# Patient Record
Sex: FEMALE | Race: WHITE | NOT HISPANIC OR LATINO | Employment: UNEMPLOYED | URBAN - METROPOLITAN AREA
[De-identification: names, ages, dates, MRNs, and addresses within clinical notes are randomized per-mention and may not be internally consistent; named-entity substitution may affect disease eponyms.]

---

## 2017-01-03 ENCOUNTER — LAB CONVERSION - ENCOUNTER (OUTPATIENT)
Dept: OTHER | Facility: OTHER | Age: 52
End: 2017-01-03

## 2017-01-03 LAB
CREATININE, RANDOM URINE (HISTORICAL): 104 MG/DL (ref 20–320)
MAGNESIUM, UR (HISTORICAL): 11.6 MG/DL
MICROALBUMIN/CREATININE RATIO (HISTORICAL): 112 MCG/MG CREAT

## 2017-01-11 ENCOUNTER — GENERIC CONVERSION - ENCOUNTER (OUTPATIENT)
Dept: OTHER | Facility: OTHER | Age: 52
End: 2017-01-11

## 2017-01-17 DIAGNOSIS — N39.0 URINARY TRACT INFECTION: ICD-10-CM

## 2017-03-05 ENCOUNTER — LAB CONVERSION - ENCOUNTER (OUTPATIENT)
Dept: OTHER | Facility: OTHER | Age: 52
End: 2017-03-05

## 2017-03-05 LAB
BACTERIA UR QL AUTO: ABNORMAL /HPF
BILIRUB UR QL STRIP: NEGATIVE
COLOR UR: YELLOW
COMMENT (HISTORICAL): ABNORMAL
FECAL OCCULT BLOOD DIAGNOSTIC (HISTORICAL): ABNORMAL
GLUCOSE (HISTORICAL): NEGATIVE
HYALINE CASTS #/AREA URNS LPF: ABNORMAL /LPF
KETONES UR STRIP-MCNC: NEGATIVE MG/DL
LEUKOCYTE ESTERASE UR QL STRIP: ABNORMAL
NITRITE UR QL STRIP: POSITIVE
PH UR STRIP.AUTO: 5.5 [PH] (ref 5–8)
PROT UR STRIP-MCNC: ABNORMAL MG/DL
RBC (HISTORICAL): ABNORMAL /HPF
SP GR UR STRIP.AUTO: 1.02 (ref 1–1.03)
SQUAMOUS EPITHELIAL CELLS (HISTORICAL): ABNORMAL /HPF
WBC # BLD AUTO: ABNORMAL /HPF

## 2017-03-06 ENCOUNTER — LAB CONVERSION - ENCOUNTER (OUTPATIENT)
Dept: OTHER | Facility: OTHER | Age: 52
End: 2017-03-06

## 2017-03-06 LAB
BACTERIA UR QL AUTO: ABNORMAL /HPF
BILIRUB UR QL STRIP: NEGATIVE
COLOR UR: YELLOW
COMMENT (HISTORICAL): ABNORMAL
CULTURE RESULT (HISTORICAL): ABNORMAL
FECAL OCCULT BLOOD DIAGNOSTIC (HISTORICAL): ABNORMAL
GLUCOSE (HISTORICAL): NEGATIVE
HYALINE CASTS #/AREA URNS LPF: ABNORMAL /LPF
KETONES UR STRIP-MCNC: NEGATIVE MG/DL
LEUKOCYTE ESTERASE UR QL STRIP: ABNORMAL
NITRITE UR QL STRIP: POSITIVE
PH UR STRIP.AUTO: 5.5 [PH] (ref 5–8)
PROT UR STRIP-MCNC: ABNORMAL MG/DL
RBC (HISTORICAL): ABNORMAL /HPF
SP GR UR STRIP.AUTO: 1.02 (ref 1–1.03)
SQUAMOUS EPITHELIAL CELLS (HISTORICAL): ABNORMAL /HPF
WBC # BLD AUTO: ABNORMAL /HPF

## 2017-03-07 ENCOUNTER — LAB CONVERSION - ENCOUNTER (OUTPATIENT)
Dept: OTHER | Facility: OTHER | Age: 52
End: 2017-03-07

## 2017-08-04 ENCOUNTER — ALLSCRIPTS OFFICE VISIT (OUTPATIENT)
Dept: OTHER | Facility: OTHER | Age: 52
End: 2017-08-04

## 2017-08-04 DIAGNOSIS — Z12.31 ENCOUNTER FOR SCREENING MAMMOGRAM FOR MALIGNANT NEOPLASM OF BREAST: ICD-10-CM

## 2017-08-06 ENCOUNTER — LAB CONVERSION - ENCOUNTER (OUTPATIENT)
Dept: OTHER | Facility: OTHER | Age: 52
End: 2017-08-06

## 2017-08-06 LAB
A/G RATIO (HISTORICAL): 1.6 (CALC) (ref 1–2.5)
ALBUMIN SERPL BCP-MCNC: 4.7 G/DL (ref 3.6–5.1)
ALP SERPL-CCNC: 106 U/L (ref 33–130)
ALT SERPL W P-5'-P-CCNC: 27 U/L (ref 6–29)
AST SERPL W P-5'-P-CCNC: 21 U/L (ref 10–35)
BACTERIA UR QL AUTO: ABNORMAL /HPF
BASOPHILS # BLD AUTO: 0.4 %
BASOPHILS # BLD AUTO: 37 CELLS/UL (ref 0–200)
BILIRUB SERPL-MCNC: 0.4 MG/DL (ref 0.2–1.2)
BILIRUB UR QL STRIP: NEGATIVE
BUN SERPL-MCNC: 24 MG/DL (ref 7–25)
BUN/CREA RATIO (HISTORICAL): ABNORMAL (CALC) (ref 6–22)
CALCIUM (ADJUSTED FOR ALBUMIN) (HISTORICAL): 10 MG/DL (CALC) (ref 8.6–10.2)
CALCIUM SERPL-MCNC: 10.2 MG/DL (ref 8.6–10.4)
CHLORIDE SERPL-SCNC: 100 MMOL/L (ref 98–110)
CHOLEST SERPL-MCNC: 163 MG/DL (ref 125–200)
CHOLEST/HDLC SERPL: 3.9 (CALC)
CO2 SERPL-SCNC: 26 MMOL/L (ref 20–31)
COLOR UR: YELLOW
COMMENT (HISTORICAL): ABNORMAL
CREAT SERPL-MCNC: 0.7 MG/DL (ref 0.5–1.05)
CULTURE RESULT (HISTORICAL): ABNORMAL
DEPRECATED RDW RBC AUTO: 13 % (ref 11–15)
EGFR AFRICAN AMERICAN (HISTORICAL): 115 ML/MIN/1.73M2
EGFR-AMERICAN CALC (HISTORICAL): 99 ML/MIN/1.73M2
EOSINOPHIL # BLD AUTO: 1.4 %
EOSINOPHIL # BLD AUTO: 129 CELLS/UL (ref 15–500)
FECAL OCCULT BLOOD DIAGNOSTIC (HISTORICAL): ABNORMAL
GAMMA GLOBULIN (HISTORICAL): 2.9 G/DL (CALC) (ref 1.9–3.7)
GLUCOSE (HISTORICAL): 126 MG/DL (ref 65–99)
GLUCOSE (HISTORICAL): NEGATIVE
GLUCOSE, PLASMA (HISTORICAL): 122 MG/DL (ref 65–99)
HCT VFR BLD AUTO: 36.7 % (ref 35–45)
HDLC SERPL-MCNC: 42 MG/DL
HEPATITIS C ANTIBODY (HISTORICAL): NORMAL
HGB BLD-MCNC: 12 G/DL (ref 11.7–15.5)
HYALINE CASTS #/AREA URNS LPF: ABNORMAL /LPF
KETONES UR STRIP-MCNC: NEGATIVE MG/DL
LDL CHOLESTEROL (HISTORICAL): 45 MG/DL (CALC)
LEUKOCYTE ESTERASE UR QL STRIP: ABNORMAL
LYMPHOCYTES # BLD AUTO: 4536 CELLS/UL (ref 850–3900)
LYMPHOCYTES # BLD AUTO: 49.3 %
MCH RBC QN AUTO: 27.6 PG (ref 27–33)
MCHC RBC AUTO-ENTMCNC: 32.7 G/DL (ref 32–36)
MCV RBC AUTO: 84.6 FL (ref 80–100)
MONOCYTES # BLD AUTO: 561 CELLS/UL (ref 200–950)
MONOCYTES (HISTORICAL): 6.1 %
NEUTROPHILS # BLD AUTO: 3938 CELLS/UL (ref 1500–7800)
NEUTROPHILS # BLD AUTO: 42.8 %
NITRITE UR QL STRIP: POSITIVE
NON-HDL-CHOL (CHOL-HDL) (HISTORICAL): 121 MG/DL (CALC)
PH UR STRIP.AUTO: ABNORMAL [PH] (ref 5–8)
PLATELET # BLD AUTO: 351 THOUSAND/UL (ref 140–400)
PMV BLD AUTO: 9.7 FL (ref 7.5–12.5)
POTASSIUM SERPL-SCNC: 4.1 MMOL/L (ref 3.5–5.3)
PROT UR STRIP-MCNC: ABNORMAL MG/DL
RBC # BLD AUTO: 4.34 MILLION/UL (ref 3.8–5.1)
RBC (HISTORICAL): ABNORMAL /HPF
SIGNAL TO CUT-OFF (HISTORICAL): 0.01
SODIUM SERPL-SCNC: 138 MMOL/L (ref 135–146)
SP GR UR STRIP.AUTO: 1.02 (ref 1–1.03)
SQUAMOUS EPITHELIAL CELLS (HISTORICAL): ABNORMAL /HPF
TOTAL PROTEIN (HISTORICAL): 7.6 G/DL (ref 6.1–8.1)
TRIGL SERPL-MCNC: 380 MG/DL
URATE CRY URNS QL MICRO: ABNORMAL /HPF
WBC # BLD AUTO: 9.2 THOUSAND/UL (ref 3.8–10.8)
WBC # BLD AUTO: ABNORMAL /HPF

## 2017-08-07 ENCOUNTER — GENERIC CONVERSION - ENCOUNTER (OUTPATIENT)
Dept: OTHER | Facility: OTHER | Age: 52
End: 2017-08-07

## 2017-08-07 LAB
CREATININE, RANDOM URINE (HISTORICAL): 101 MG/DL (ref 20–320)
EST. AVERAGE GLUCOSE BLD GHB EST-MCNC: 186 MG/DL (CALC)
HBA1C MFR BLD HPLC: 7.4 % OF TOTAL HGB
MAGNESIUM, UR (HISTORICAL): 21.8 MG/DL
MICROALBUMIN/CREATININE RATIO (HISTORICAL): 216 MCG/MG CREAT

## 2017-08-08 ENCOUNTER — LAB CONVERSION - ENCOUNTER (OUTPATIENT)
Dept: OTHER | Facility: OTHER | Age: 52
End: 2017-08-08

## 2017-09-21 ENCOUNTER — LAB REQUISITION (OUTPATIENT)
Dept: LAB | Facility: HOSPITAL | Age: 52
End: 2017-09-21
Payer: COMMERCIAL

## 2017-09-21 ENCOUNTER — ALLSCRIPTS OFFICE VISIT (OUTPATIENT)
Dept: OTHER | Facility: OTHER | Age: 52
End: 2017-09-21

## 2017-09-21 DIAGNOSIS — R21 RASH AND OTHER NONSPECIFIC SKIN ERUPTION: ICD-10-CM

## 2017-09-21 PROCEDURE — 87252 VIRUS INOCULATION TISSUE: CPT | Performed by: INTERNAL MEDICINE

## 2017-10-02 LAB — VIRUS SPEC CULT: NORMAL

## 2017-10-04 ENCOUNTER — GENERIC CONVERSION - ENCOUNTER (OUTPATIENT)
Dept: OTHER | Facility: OTHER | Age: 52
End: 2017-10-04

## 2018-01-02 ENCOUNTER — GENERIC CONVERSION - ENCOUNTER (OUTPATIENT)
Dept: OTHER | Facility: OTHER | Age: 53
End: 2018-01-02

## 2018-01-02 ENCOUNTER — GENERIC CONVERSION - ENCOUNTER (OUTPATIENT)
Dept: INTERNAL MEDICINE CLINIC | Facility: CLINIC | Age: 53
End: 2018-01-02

## 2018-01-11 NOTE — PROGRESS NOTES
Assessment    1  Need for hepatitis C screening test (V73 89) (Z11 59)   2  Encounter for preventive health examination (V70 0) (Z00 00)    Plan  Anemia, Benign essential hypertension, DM (diabetes mellitus screen), Hematuria,  microscopic, Hyperlipidemia, Obesity    · (1) URINALYSIS w URINE C/S REFLEX (will reflex a microscopy if leukocytes, occult  blood, or nitrites are not within normal limits); Status:Active; Requested for:39Iul7357;    · (Q) CBC (INCLUDES DIFF/PLT) (REFL); Status:Active; Requested for:04Aug2017;    · (Q) COMPREHENSIVE METABOLIC PNL W/ADJUSTED CALCIUM; Status:Active; Requested for:04Aug2017;    · (Q) GLUCOSE, PLASMA; Status:Active; Requested for:04Aug2017;    · (Q) LIPID PANEL WITH REFLEX TO DIRECT LDL; Status:Active; Requested  for:04Aug2017;    · (Q) MICROALBUMIN, RANDOM URINE (W/CREATININE); Status:Active; Requested  for:04Aug2017; Anemia, Benign essential hypertension, DM (diabetes mellitus screen), Hematuria,  microscopic, Hyperlipidemia, Obesity, Type 2 diabetes mellitus with hyperglycemia    · (Q) HEMOGLOBIN A1C WITH MPG; Status:Active; Requested for:04Aug2017;   Need for hepatitis C screening test    · (Q) HEPATITIS C ANTIBODY; Status:Active; Requested for:04Aug2017;   Screen for colon cancer    · 1 - Leslie Brown DO (Gastroenterology) Co-Management  *  Status: Active   Requested for: 12JFS3707  Care Summary provided  : Yes  Type 2 diabetes mellitus with hyperglycemia    · *VB - Foot Exam; Status:Active; Requested for:20Qtn9893;     Discussion/Summary  health maintenance visit Currently, she eats a poor diet and has an inadequate exercise regimen  cervical cancer screening is not indicated Breast cancer screening: mammogram is current  Colorectal cancer screening: colonoscopy has been ordered  Osteoporosis screening: bone mineral density testing is not indicated  Screening lab work includes hemoglobin, glucose and lipid profile   The immunizations are up to date and She recalls receiving the pneumonia vaccine in the apst bec she has asthma  Hepatitis C Screening:  The patient agrees to Hepatitis C screening  Wellness form completed for insurance  We agreed that she will return for a yearly physical    The patient was counseled regarding impressions  Possible side effects of new medications were reviewed with the patient/guardian today  The treatment plan was reviewed with the patient/guardian  The patient/guardian understands and agrees with the treatment plan      Chief Complaint  Wellness      History of Present Illness  HM, Adult Female: The patient is being seen for a health maintenance evaluation  The last health maintenance visit was 1 year(s) ago  Social History: Household members include spouse  She is   The patient has never smoked cigarettes  She reports occasional alcohol use  The patient has no concerns about alcohol abuse  She has never used illicit drugs  General Health: The patient's health since the last visit is described as good  She has regular dental visits  The patient reports her last dental visit was 5/2017  She complains of vision problems  Vision care includes wearing glasses and having regular eye examinations  She has hearing loss  Immunizations status: up to date  Lifestyle:  She consumes a diverse and healthy diet  She is on a diet and is generally adherent  She does not have any weight concerns  She exercises regularly  She exercises 3 or more times per week for 30 or more minutes per session  Exercise includes walking  She does not use tobacco  The patient has never smoked cigarettes  She consumes alcohol  She reports occasional alcohol use  She typically drinks beer  Alcohol concern: The patient has no concerns about alcohol abuse  She denies drug use  She has never used illicit drugs  Reproductive health: the patient is perimenopausal   she reports normal menses  she uses no contraception  she is sexually active   She is monogamous with a male partner  Screening: Cervical cancer screening includes uncertain timing of her last pap smear and s/p TAHBSO  Breast cancer screening includes a mammogram performed last year  She hasn't been previously screened for colorectal cancer  Metabolic screening includes lipid profile performed 12/2016 and glucose screening performed 12/2016  Cardiovascular risk factors: hypertension, diabetes and obesity, but no high LDL cholesterol, no low HDL cholesterol, no stress, no tobacco use, no illicit drug use, no sedentary lifestyle and no family history of cardiovascular disease  Review of Systems    Constitutional: no fever, not feeling poorly, no chills and not feeling tired  Cardiovascular: no chest pain and no palpitations  Respiratory: no shortness of breath and no cough  Gastrointestinal: no abdominal pain, no constipation and no diarrhea  Genitourinary: mild pelvic pressure, low back pain for a few days, but no dysuria  Active Problems    1  Allergic rhinitis (477 9) (J30 9)   2  Anemia (285 9) (D64 9)   3  Asthma (493 90) (J45 909)   4  Benign essential hypertension (401 1) (I10)   5  Breast screening (V76 10) (Z12 31)   6  DM (diabetes mellitus screen) (V77 1) (Z13 1)   7  Elevated erythrocyte sedimentation rate (790 1) (R70 0)   8  Hematuria, microscopic (599 72) (R31 29)   9  Hot flashes (627 2) (N95 1)   10  Hyperlipidemia (272 4) (E78 5)   11  Irritable (799 22) (R45 4)   12  Irritable bowel syndrome (564 1) (K58 9)   13  Menopause (627 2) (Z78 0)   14  Moderate obstructive sleep apnea (327 23) (G47 33)   15  Need for prophylactic vaccination and inoculation against influenza (V04 81) (Z23)   16  Obesity (278 00) (E66 9)   17  Renal stone (592 0) (N20 0)   18  Screen for colon cancer (V76 51) (Z12 11)   19  Screening for hyperlipidemia (V77 91) (Z13 220)   20  Screening, anemia, deficiency, iron (V78 0) (Z13 0)   21  Snoring (786 09) (R06 83)   22   Type 2 diabetes mellitus with hyperglycemia (250 00) (E11 65)   23  Urge and stress incontinence (788 33) (N39 46)   24  Visit for screening mammogram (V76 12) (Z12 31)    Past Medical History    · History of Abnormal LFTs (liver function tests) (790 6) (R79 89)   · History of Blood pressure elevated (401 9) (I10)   · History of anemia (V12 3) (Z86 2)   · History of cough   · History of hematuria (V13 09) (Z87 448)   · History of hypercalcemia (V12 29) (Z86 39)   · History of joint pain (V13 59) (Z87 39)   · History of leukocytosis (V12 3) (Z86 2)   · History of Papanicolaou smear (V45 89) (Z98 890)   · History of urinary tract infection (V13 02) (Z87 440)   · History of Microalbuminuria (791 0) (R80 9)   · History of Pain of finger, unspecified laterality (729 5) (M79 646)    Surgical History    · History of  Section   · History of Total Abdominal Hysterectomy With Removal Of Both Ovaries    Family History  Father    · Family history of borderline diabetes mellitus (V18 0) (Z84 89)   · Family history of melanoma (V16 8) (Z80 8)    Social History    · Being A Social Drinker   · Exercising Regularly   · Never A Smoker    Current Meds   1  Aspirin 81 MG TABS; TAKE 1 TABLET DAILY FOR STROKE PREVENTION Recorded   2  Atorvastatin Calcium 10 MG Oral Tablet; TAKE 1 TABLET AT BEDTIME; Therapy: 85NHC9091 to (Last Rx:15Kia5530)  Requested for: 2017 Ordered   3  Fish Oil CAPS; 2,000 mg capsule 1x daily; Therapy: (Recorded:82Olf8973) to Recorded   4  Lisinopril 5 MG Oral Tablet; Take 1 tablet daily; Therapy: 34YZQ7080 to (Last Rx:72Fih0601)  Requested for: 2017 Ordered   5  MetFORMIN HCl - 500 MG Oral Tablet; TAKE 1 TABLET TWICE A DAY WITH FOOD; Therapy: 84YVJ7556 to (Last Rx:49Phw2140)  Requested for: 2017 Ordered   6  Montelukast Sodium 10 MG Oral Tablet; Take 1 Tablet in the Evening; Therapy: 48TKV7582 to (Last Rx:46Vqu6191)  Requested for: 86Ixw9874 Ordered   7   OneTouch Delica Lancets 75Z Miscellaneous; USE TO TEST THREE TIMES A DAY; Therapy: 86KHB6237 to (Last Rx:64Dbe6071)  Requested for: 43Zzy6428 Ordered   8  OneTouch Ultra Blue In [employer]; USE 1 STRIP THREE TIMES A DAY; Therapy: 59QHW8034 to (Last Rx:29Cfz7970)  Requested for: 14Pjm2128 Ordered   9  ProAir  (90 Base) MCG/ACT Inhalation Aerosol Solution; INHALE 1 TO 2 PUFFS   EVERY 4 TO 6 HOURS AS NEEDED; Therapy: 05ZKR4184 to (Evaluate:61Teg7248); Last Rx:17Jun2013 Ordered   10  Qvar 40 MCG/ACT Inhalation Aerosol Solution; inhale 1 puff twice daily; Therapy: 32Myb1332 to (Evaluate:43Ckg7793)  Requested for: 20Bnm2700; Last    Rx:88Oxe9595 Ordered    Allergies    1  No Known Drug Allergies    2  Seasonal    Vitals   Recorded: 04Aug2017 10:05AM   Heart Rate 84   Systolic 318   Diastolic 76   Height 5 ft 3 in   Weight 186 lb 6 oz   BMI Calculated 33 02   BSA Calculated 1 88   O2 Saturation 97     Physical Exam    Constitutional   General appearance: No acute distress, well appearing and well nourished  obese  Head and Face   Head and face: Normal     Eyes   Conjunctiva and lids: No swelling, erythema or discharge  Ears, Nose, Mouth, and Throat   Otoscopic examination: Tympanic membranes translucent with normal light reflex  Canals patent without erythema  Oropharynx: Normal with no erythema, edema, exudate or lesions  Neck   Neck: Supple, symmetric, trachea midline, no masses  Thyroid: Normal, no thyromegaly  Pulmonary   Respiratory effort: No increased work of breathing or signs of respiratory distress  Auscultation of lungs: Clear to auscultation  Cardiovascular   Auscultation of heart: Normal rate and rhythm, normal S1 and S2, no murmurs  Examination of extremities for edema and/or varicosities: Normal     Abdomen   Abdomen: Non-tender, no masses  Liver and spleen: No hepatomegaly or splenomegaly  Lymphatic   Palpation of lymph nodes in neck: No lymphadenopathy      Musculoskeletal   Gait and station: Normal  Psychiatric   Orientation to person, place, and time: Normal     Mood and affect: Normal     Socks and shoes removed, Right Foot Findings: normal foot  The toes were normal  Socks and Shoes removed, Left Foot Findings: normal foot  The toes were normal    Monofilament Testing: normal tactile sensation with monofilament testing throughout both feet  Vascular: Pulses: 2+ in the dorsalis pedis  Pulses: 2+ in the dorsalis pedis  Assign Risk Category: 0: No loss of protective sensation, no deformity  No present risk  Future Appointments    Date/Time Provider Specialty Site   08/10/2018 10:00 AM ZUNILDA Alvarez   [facility]   Electronically signed by : ZUNILDA Dye ; Aug  4 2017 11:44AM EST                       (Author)

## 2018-01-12 VITALS
HEIGHT: 63 IN | DIASTOLIC BLOOD PRESSURE: 76 MMHG | WEIGHT: 186.38 LBS | HEART RATE: 84 BPM | SYSTOLIC BLOOD PRESSURE: 126 MMHG | OXYGEN SATURATION: 97 % | BODY MASS INDEX: 33.02 KG/M2

## 2018-01-13 VITALS
OXYGEN SATURATION: 97 % | HEIGHT: 63 IN | HEART RATE: 82 BPM | DIASTOLIC BLOOD PRESSURE: 70 MMHG | SYSTOLIC BLOOD PRESSURE: 120 MMHG

## 2018-01-13 NOTE — PROGRESS NOTES
Assessment    1  Encounter for preventive health examination (V70 0) (Z00 00)   2  DM (diabetes mellitus screen) (V77 1) (Z13 1)   3  Screening for hyperlipidemia (V77 91) (Z13 220)   4  Screening, anemia, deficiency, iron (V78 0) (Z13 0)   5  Type 2 diabetes mellitus with hyperglycemia (250 00) (E11 65)   6  Hyperlipidemia (272 4) (E78 5)   7  Visit for screening mammogram (V76 12) (Z12 31)    Plan   Benign essential hypertension, Type 2 diabetes mellitus with hyperglycemia    · Lisinopril 5 MG Oral Tablet; Take 1 tablet daily  DM (diabetes mellitus screen)    · (1) GLUCOSE,  FASTING; Status:Active; Requested for:20Jqk1372;    · (Q) HEMOGLOBIN A1C WITH MPG; Status:Active; Requested for:37Qmi0405;   Hyperlipidemia    · Atorvastatin Calcium 10 MG Oral Tablet; TAKE 1 TABLET AT BEDTIME  Screening, anemia, deficiency, iron    · (Q) CBC (INCLUDES DIFF/PLT) (REFL); Status:Active; Requested for:05Stk7229;   Type 2 diabetes mellitus with hyperglycemia    · MetFORMIN HCl - 500 MG Oral Tablet; TAKE 1 TABLET TWICE A DAY WITH  FOOD   · OneTouch Delica Lancets 54E Miscellaneous; test tid   · OneTouch Ultra Blue In Vitro Strip; USE 1 STRIP TID   · (1) MICROALBUMIN CREATININE RATIO, RANDOM URINE; Status:Active; Requested  for:98Ndi9098;    · (Q) COMPREHENSIVE METABOLIC PNL W/ADJUSTED CALCIUM; Status:Active; Requested for:23Gzp3614;    · (Q) URINALYSIS REFLEX; Status:Active; Requested for:29Miz4015;    · *VB - Foot Exam; Status:Active; Requested for:52Mup4028;     (Q) LIPID PANEL WITH REFLEX TO DIRECT LDL; Status:Resulted - Requires Verification;   Done: 76KVC8930 12:00AM  Due:17Aug2017;Ordered; For:Screening for hyperlipidemia; Ordered By:Reyes, Lea;   * MAMMO SCREENING BILATERAL W CAD; Status:Hold For - Scheduling,Exact Date; Requested for:Dec 2016;   Perform:[facility]; MARK:14VAN5217;HFHSZDL;     For:Visit for screening mammogram; Ordered By:Reyes, Lea;      Discussion/Summary  health maintenance visit Currently, she eats a poor diet and has an inadequate exercise regimen  cervical cancer screening is not indicated Breast cancer screening: mammogram is current  [place]  Osteoporosis screening: bone mineral density testing is not indicated  Screening lab work includes hemoglobin, glucose and lipid profile  The Flu vaccine annually  Declines to f/u >1 a year  Will wait for labs  Glucose and lipid panel required for wellness program for her 's work  Insists only "routine" tests be ordered per her conversation with her insurance based on last year's labs  CMP, urine tests apparently not "routine" and will cost her ($4000 deductible)  I asked her to confirm this because she needs a CMP done   The patient was counseled regarding impressions  Chief Complaint  Wellness      History of Present Illness  HM, Adult Female: The patient is being seen for a health maintenance evaluation  The last health maintenance visit was 1 year(s) ago  Social History: Household members include spouse  She is   General Health: She has regular dental visits  She complains of vision problems  Vision care includes wearing glasses and an eye examination within the last year  She denies hearing loss  Immunizations status: up to date  Lifestyle:  She does not have a healthy diet  She has weight concerns  She does not exercise regularly  She does not use tobacco    Reproductive health: the patient is postmenopausal    Screening: Cervical cancer screening includes uncertain timing of her last pap smear  Breast cancer screening includes a mammogram performed last year  Metabolic screening includes lipid profile performed within the past five years and glucose screening performed last year  Cardiovascular risk factors: diabetes, but no hypertension  Review of Systems    Constitutional: recent 21 lb weight loss, but no fever and no chills     Cardiovascular: no chest pain and no palpitations  Respiratory: no shortness of breath and no cough  Gastrointestinal: no abdominal pain, no nausea and no constipation  Genitourinary: no dysuria  Active Problems    1  Abnormal LFTs (liver function tests) (790 6) (R79 89)   2  Allergic rhinitis (477 9) (J30 9)   3  Arthralgia (719 40) (M25 50)   4  Asthma (493 90) (J45 909)   5  Benign essential hypertension (401 1) (I10)   6  Breast screening (V76 10) (Z12 39)   7  Cough (786 2) (R05)   8  Elevated erythrocyte sedimentation rate (790 1) (R70 0)   9  Hematuria (599 70) (R31 9)   10  Hematuria, microscopic (599 72) (R31 2)   11  Hot flashes (627 2) (N95 1)   12  Hypercalcemia (275 42) (E83 52)   13  Hyperlipidemia (272 4) (E78 5)   14  Irritable (799 22) (R45 4)   15  Irritable bowel syndrome (564 1) (K58 9)   16  Leukocytosis (leucocytosis) (288 60) (D72 829)   17  Menopause (627 2) (Z78 0)   18  Microalbuminuria (791 0) (R80 9)   19  Moderate obstructive sleep apnea (327 23) (G47 33)   20  Need for prophylactic vaccination and inoculation against influenza (V04 81) (Z23)   21  Obesity (278 00) (E66 9)   22  Pain of finger, unspecified laterality (729 5) (M79 646)   23  Pap smear for cervical cancer screening (V76 2) (Z12 4)   24  Renal stone (592 0) (N20 0)   25  Screen for colon cancer (V76 51) (Z12 11)   26  Snoring (786 09) (R06 83)   27  Type 2 diabetes mellitus with hyperglycemia (250 00) (E11 65)   28  Urge and stress incontinence (788 33) (N39 46)   29  UTI (urinary tract infection) (599 0) (N39 0)   30   Visit for screening mammogram (V76 12) (Z12 31)    Past Medical History    · History of Blood pressure elevated (401 9) (I10)   · History of anemia (V12 3) (Z86 2)    Surgical History    · History of  Section   · History of Total Abdominal Hysterectomy With Removal Of Both Ovaries    Family History  Father    · Family history of borderline diabetes mellitus (V18 0) (Z83 3)   · Family history of melanoma (V16 8) (Z80 8)    Social History    · Being A Social Drinker   · Exercising Regularly   · Never A Smoker    Current Meds   1  Aspirin 81 MG TABS; TAKE 1 TABLET DAILY FOR STROKE PREVENTION Recorded   2  Atorvastatin Calcium 10 MG Oral Tablet; TAKE 1 TABLET DAILY AT BEDTIME; Therapy: 68QAH2913 to (Evaluate:48Fyk7139)  Requested for: 97Fdg6699; Last   Rx:33Ytl7994 Ordered   3  Levocetirizine Dihydrochloride 5 MG Oral Tablet; Take 1 tablet daily; Therapy: 34AUD0510 to (Last Rx:00Gpm2045)  Requested for: 09Ube2389 Ordered   4  Lisinopril 5 MG Oral Tablet; Take 1 tablet daily; Therapy: 32HYZ8145 to (Last Rx:62Zgw2846)  Requested for: 67Unb1530 Ordered   5  MetFORMIN HCl - 500 MG Oral Tablet; TAKE 1 TABLET TWICE A DAY WITH FOOD; Therapy: 01QNZ0792 to (Last Rx:82Yof0585)  Requested for: 43Isx5397 Ordered   6  Montelukast Sodium 10 MG Oral Tablet; Therapy: (Recorded:36Tmq0841) to Recorded   7  OneTouch Delica Lancets 32C Miscellaneous; test tid; Therapy: 47EUO7534 to (Last Rx:28Yha9420)  Requested for: 44Eqs9705 Ordered   8  OneTouch Ultra Blue In Vitro Strip; USE 1 STRIP TID; Therapy: 23GUD1944 to (Evaluate:02Wcz1005)  Requested for: 19Vea8777; Last   Rx:44Xhh7576 Ordered   9  ProAir  (90 Base) MCG/ACT Inhalation Aerosol Solution; INHALE 1 TO 2 PUFFS   EVERY 4 TO 6 HOURS AS NEEDED; Therapy: 19SNH6649 to (Evaluate:52Htx0206); Last Rx:17Jun2013 Ordered   10  Qvar 40 MCG/ACT Inhalation Aerosol Solution; inhale 1 puff twice daily; Therapy: 84Vzn5776 to (Last Rx:75Pzf9852)  Requested for: 87Nqk9244 Ordered    Allergies    1  No Known Drug Allergies    2  Seasonal    Vitals   Recorded: 52ZST9448 90:64KD   Systolic 536   Diastolic 64   Heart Rate 95   Temperature 98 2 F   O2 Saturation 98   Height 5 ft 3 in   Weight 179 lb 6 oz   BMI Calculated 31 77   BSA Calculated 1 85     Physical Exam    Constitutional   General appearance: No acute distress, well appearing and well nourished  obese     Head and Face   Head and face: Normal     Eyes   Conjunctiva and lids: No swelling, erythema or discharge  Ears, Nose, Mouth, and Throat   Otoscopic examination: Tympanic membranes translucent with normal light reflex  Canals patent without erythema  Oropharynx: Normal with no erythema, edema, exudate or lesions  Neck   Neck: Supple, symmetric, trachea midline, no masses  Thyroid: Normal, no thyromegaly  Pulmonary   Respiratory effort: No increased work of breathing or signs of respiratory distress  Percussion of chest: Normal     Cardiovascular   Auscultation of heart: Normal rate and rhythm, normal S1 and S2, no murmurs  Abdomen   Abdomen: Non-tender, no masses  Liver and spleen: No hepatomegaly or splenomegaly  Lymphatic   Palpation of lymph nodes in neck: No lymphadenopathy  Musculoskeletal   Gait and station: Normal     Psychiatric   Orientation to person, place, and time: Normal     Mood and affect: Normal     Right Foot Findings: normal foot  The toes were normal  Left Foot Findings: normal foot  The toes were normal    Monofilament Testing: normal tactile sensation with monofilament testing throughout both feet  Vascular: Pulses: 2+ in the dorsalis pedis  Pulses: 2+ in the dorsalis pedis  Assign Risk Category: 0: No loss of protective sensation, no deformity  No present risk  Future Appointments    Date/Time Provider Specialty Site   08/04/2017 10:00 AM ZUNILDA Woods   [facility]   02/08/2017 10:45 AM Elian Townsend MD Urology [address]     Signatures   Electronically signed by : ZUNILDA Sinha ; Aug 21 2016  5:36PM EST                       (Author)

## 2018-01-14 NOTE — RESULT NOTES
Message   Please call her   Viral culture did not grow anything   Continue gabapentin for the pain and wean off once she is ready, when pain is controlled/better           Verified Results  (1) CULTURE, VIRUS COMPREHENSIVE 52Fje3360 05:00PM Denise Waldron     Test Name Result Flag Reference   CLINICAL REPORT (Report)     Test:        Virus culture  Specimen Type: Other  Specimen Date:   9/21/2017 5:00 PM  Result Date:    10/2/2017 8:07 AM  Result Status:   Final result  Resulting Lab:    [facility]             [address] [facility] [address]      CULTURE                                       ------------------                                   No virus isolated         *** Performed at: [address] [place] [address], [place], [place]      [id number] Director: Janina Bobo MD, Phone: [phone] ***   Source; left flank side    Performed at:  [address], [place], [place]  [id number]  : Janina Bobo MD, Phone:  [phone]       Signatures   Electronically signed by : ZUNILDA Moreno ; Oct  4 2017  4:46PM EST                       (Author)

## 2018-01-15 NOTE — RESULT NOTES
Message     Her sugar/A1C albin from last year to 7 4 I would like her to increase the metformin to 1000mg BID (she is taking 500mg BID)   Repeat labs in 3months and schedule appt   I'll mail her the labs   She also appears to have a UTI and I will send a script for a week of amoxicillin for her to be taken TID        Verified Results  (Q) GLUCOSE, PLASMA [id number] 12:04PM Cleo Rodgers     Test Name Result Flag Reference   GLUCOSE, PLASMA 122 mg/dL H 65-99   Fasting reference interval     For someone without known diabetes, a glucose value  between 100 and 125 mg/dL is consistent with  prediabetes and should be confirmed with a  follow-up test      (Q) LIPID PANEL WITH REFLEX TO DIRECT LDL [id number] 12:04PM Cleo Rodgers     Test Name Result Flag Reference   CHOLESTEROL, TOTAL 163 mg/dL  125-200   HDL CHOLESTEROL 42 mg/dL L > OR = 46   TRIGLICERIDES 589 mg/dL H <150   LDL-CHOLESTEROL 45 mg/dL (calc)  <130   Desirable range <100 mg/dL for patients with CHD or  diabetes and <70 mg/dL for diabetic patients with  known heart disease  CHOL/HDLC RATIO 3 9 (calc)  < OR = 5 0   NON HDL CHOLESTEROL 121 mg/dL (calc)     Target for non-HDL cholesterol is 30 mg/dL higher than   LDL cholesterol target  (Q) HEMOGLOBIN A1C WITH MPG [id number] 12:04PM Cleo Rodgers   REPORT COMMENT:  FASTING:YES     Test Name Result Flag Reference   HEMOGLOBIN A1c 7 4 % of total Hgb H <5 7   For someone without known diabetes, a hemoglobin A1c  value of 6 5% or greater indicates that they may have   diabetes and this should be confirmed with a follow-up   test      For someone with known diabetes, a value <7% indicates   that their diabetes is well controlled and a value   greater than or equal to 7% indicates suboptimal   control  A1c targets should be individualized based on   duration of diabetes, age, comorbid conditions, and   other considerations       Currently, no consensus exists regarding use of  hemoglobin A1c for diagnosis of diabetes for children  MEAN PLASMA GLUCOSE 186 mg/dL (calc)       (Q) CBC (INCLUDES DIFF/PLT) (REFL) [id number] 12:04PM Rich Fines     Test Name Result Flag Reference   WHITE BLOOD CELL COUNT 9 2 Thousand/uL  3 8-10 8   RED BLOOD CELL COUNT 4 34 Million/uL  3 80-5 10   HEMOGLOBIN 12 0 g/dL  11 7-15 5   HEMATOCRIT 36 7 %  35 0-45 0   MCV 84 6 fL  80 0-100 0   MCH 27 6 pg  27 0-33 0   MCHC 32 7 g/dL  32 0-36 0   RDW 13 0 %  11 0-15 0   PLATELET COUNT 885 Thousand/uL  140-400   MPV 9 7 fL  7 5-12 5   ABSOLUTE NEUTROPHILS 3938 cells/uL  9792-2825   ABSOLUTE LYMPHOCYTES 4536 cells/uL H 850-3900   ABSOLUTE MONOCYTES 561 cells/uL  200-950   ABSOLUTE EOSINOPHILS 129 cells/uL     ABSOLUTE BASOPHILS 37 cells/uL  0-200   NEUTROPHILS 42 8 %     LYMPHOCYTES 49 3 %     MONOCYTES 6 1 %     EOSINOPHILS 1 4 %     BASOPHILS 0 4 %       (Q) COMPREHENSIVE METABOLIC PNL W/ADJUSTED CALCIUM [id number] 12:04PM Rich Fines     Test Name Result Flag Reference   GLUCOSE 126 mg/dL H 65-99   Fasting reference interval     For someone without known diabetes, a glucose  value >125 mg/dL indicates that they may have  diabetes and this should be confirmed with a  follow-up test    UREA NITROGEN (BUN) 24 mg/dL  7-25   CREATININE 0 70 mg/dL  0 50-1 05   For patients >52years of age, the reference limit  for Creatinine is approximately 13% higher for people  identified as -American  eGFR NON-AFR   AMERICAN 99 mL/min/1 73m2  > OR = 60   eGFR AFRICAN AMERICAN 115 mL/min/1 73m2  > OR = 60   BUN/CREATININE RATIO   2-09   NOT APPLICABLE (calc)   SODIUM 138 mmol/L  135-146   POTASSIUM 4 1 mmol/L  3 5-5 3   CHLORIDE 100 mmol/L     CARBON DIOXIDE 26 mmol/L  20-31   CALCIUM 10 2 mg/dL  8 6-10 4   CALCIUM (ADJUSTED FOR$ALBUMIN) 10 0 mg/dL (calc)  8 6-10 2   PROTEIN, TOTAL 7 6 g/dL  6 1-8 1   ALBUMIN 4 7 g/dL  3 6-5 1   GLOBULIN 2 9 g/dL (calc)  1 9-3 7   ALBUMIN/GLOBULIN RATIO 1 6 (calc)  1 0-2 5   BILIRUBIN, TOTAL 0 4 mg/dL  0 2-1 2   ALKALINE PHOSPHATASE 106 U/L     AST 21 U/L  10-35   ALT 27 U/L  6-29     (Q) MICROALBUMIN, RANDOM URINE (W/CREATININE) [id number] 12:04PM [facility]     Test Name Result Flag Reference   CREATININE, RANDOM URINE 101 mg/dL     MICROALBUMIN 21 8 mg/dL     Reference Range  Not established   MICROALBUMIN/CREATININE$RATIO, RANDOM URINE 216 mcg/mg creat H <30   The ADA defines abnormalities in albumin  excretion as follows:     Category         Result (mcg/mg creatinine)     Normal                    <30  Microalbuminuria            Clinical albuminuria   > OR = 300     The ADA recommends that at least two of three  specimens collected within a 3-6 month period be  abnormal before considering a patient to be  within a diagnostic category  (1) URINALYSIS w URINE C/S REFLEX (will reflex a microscopy if leukocytes, occult blood, or nitrites are not within normal limits) [id number] 12:04PM [facility]     Test Name Result Flag Reference   SPECIFIC GRAVITY 1 022  1 001-1 035   BILIRUBIN NEGATIVE  NEGATIVE   URIC ACID CRYSTALS MANY /HPF A NONE OR FEW   GLUCOSE NEGATIVE  NEGATIVE   COLOR YELLOW  YELLOW   APPEARANCE CLOUDY A CLEAR   PH < OR = 5 0  5 0-8 0   KETONES NEGATIVE  NEGATIVE   OCCULT BLOOD 3+ A NEGATIVE   PROTEIN 1+ A NEGATIVE   SQUAMOUS EPITHELIAL CELLS 0-5 /HPF  < OR = 5   HYALINE CAST NONE SEEN /LPF  NONE SEEN   REFLEXIVE URINE CULTURE      CULTURE INDICATED - RESULTS TO FOLLOW   RBC > OR = 60 /HPF A < OR = 2   NITRITE POSITIVE A NEGATIVE   LEUKOCYTE ESTERASE 3+ A NEGATIVE   WBC > OR = 60 /HPF A < OR = 5   BACTERIA MANY /HPF A NONE SEEN     (Q) HEPATITIS C ANTIBODY [id number] 12:04PM [facility]     Test Name Result Flag Reference   HEPATITIS C ANTIBODY NON-REACTIVE  NON-REACTIVE   SIGNAL TO CUT-OFF 0 01  <1 00       Plan  Benign essential hypertension, Type 2 diabetes mellitus with hyperglycemia    · (Q) BASIC METABOLIC PANEL W/O CA; Status:Active;  Requested QUV:[id number];    · (Q) HEMOGLOBIN A1C WITH MPG; Status:Active;  Requested CJF:[id number];   Type 2 diabetes mellitus with hyperglycemia    · From  MetFORMIN HCl - 500 MG Oral Tablet TAKE 1 TABLET TWICE A DAY  WITH FOOD To MetFORMIN HCl - 500 MG Oral Tablet TAKE 2 TABLETS TWICE A DAY  WITH FOOD  UTI (urinary tract infection)    · Amoxicillin 500 MG Oral Capsule; TAKE 1 CAPSULE 3 times daily    Signatures   Electronically signed by : ZUNILDA Fonseca ; Aug  7 2017 12:53PM EST                       (Author)

## 2018-01-17 NOTE — RESULT NOTES
Message   Diabetes is controlled, A1C is 6 7  She is mildly anemic  I would like to recheck this since this is a new finding  Order entered  Please mail repeat hemoglobin hematocrit  Also with blood and bacteria in urine  I think she was supposed to see urology but this was cancelled? I don't see a follow up scheduled  She has a stone on the left kidney  Verified Results  (Q) HEMOGLOBIN A1C WITH MPG [id number] 09:53AM [facility]     Test Name Result Flag Reference   HEMOGLOBIN A1c 6 7 % of total Hgb H <5 7   According to ADA guidelines, hemoglobin A1c <7 0%  represents optimal control in non-pregnant diabetic  patients  Different metrics may apply to specific  patient populations  Standards of Medical Care in    Diabetes Care  2013;36:s11-s66     For the purpose of screening for the presence of  diabetes  <5 7%       Consistent with the absence of diabetes  5 7-6 4%    Consistent with increased risk for diabetes              (prediabetes)  >or=6 5%    Consistent with diabetes     This assay result is consistent with diabetes  mellitus  Currently, no consensus exists for use of hemoglobin  A1c for diagnosis of diabetes for children     MEAN PLASMA GLUCOSE 161 mg/dL (calc)       (Q) CBC (INCLUDES DIFF/PLT) (REFL) [id number] 09:53AM [facility]     Test Name Result Flag Reference   WHITE BLOOD CELL COUNT 9 7 Thousand/uL  3 8-10 8   RED BLOOD CELL COUNT 4 04 Million/uL  3 80-5 10   HEMOGLOBIN 11 3 g/dL L 11 7-15 5   HEMATOCRIT 34 0 % L 35 0-45 0   MCV 84 2 fL  80 0-100 0   MCH 28 0 pg  27 0-33 0   MCHC 33 3 g/dL  32 0-36 0   RDW 13 6 %  11 0-15 0   PLATELET COUNT 348 Thousand/uL  140-400   MPV 8 2 fL  7 5-11 5   ABSOLUTE NEUTROPHILS 4976 cells/uL  4912-8559   ABSOLUTE LYMPHOCYTES 4064 cells/uL H 850-3900   ABSOLUTE MONOCYTES 417 cells/uL  200-950   ABSOLUTE EOSINOPHILS 204 cells/uL     ABSOLUTE BASOPHILS 39 cells/uL  0-200   NEUTROPHILS 51 3 %     LYMPHOCYTES 41 9 %     MONOCYTES 4 3 % EOSINOPHILS 2 1 %     BASOPHILS 0 4 %       (Q) COMPREHENSIVE METABOLIC PNL W/ADJUSTED CALCIUM [id number] 09:53AM Lonzie Nickel     Test Name Result Flag Reference   GLUCOSE 107 mg/dL H 65-99   Fasting reference interval   UREA NITROGEN (BUN) 22 mg/dL  7-25   CREATININE 0 73 mg/dL  0 50-1 05   For patients >52years of age, the reference limit  for Creatinine is approximately 13% higher for people  identified as -American  eGFR NON-AFR   AMERICAN 94 mL/min/1 73m2  > OR = 60   eGFR AFRICAN AMERICAN 109 mL/min/1 73m2  > OR = 60   BUN/CREATININE RATIO   2-66   NOT APPLICABLE (calc)   SODIUM 141 mmol/L  135-146   POTASSIUM 3 9 mmol/L  3 5-5 3   CHLORIDE 104 mmol/L     CARBON DIOXIDE 26 mmol/L  20-31   CALCIUM 9 6 mg/dL  8 6-10 4   CALCIUM (ADJUSTED FOR$ALBUMIN) 9 7 mg/dL (calc)  8 6-10 2   PROTEIN, TOTAL 6 7 g/dL  6 1-8 1   ALBUMIN 4 3 g/dL  3 6-5 1   GLOBULIN 2 4 g/dL (calc)  1 9-3 7   ALBUMIN/GLOBULIN RATIO 1 8 (calc)  1 0-2 5   BILIRUBIN, TOTAL 0 2 mg/dL  0 2-1 2   ALKALINE PHOSPHATASE 100 U/L     AST 13 U/L  10-35   ALT 16 U/L  6-29     (Q) URINALYSIS REFLEX [id number] 09:53AM Lonzie Nickel   REPORT COMMENT:  FASTING:YES     Test Name Result Flag Reference   COLOR YELLOW  YELLOW   APPEARANCE CLEAR  CLEAR   SPECIFIC GRAVITY 1 022  1 001-1 035   PH 5 5  5 0-8 0   GLUCOSE NEGATIVE  NEGATIVE   BILIRUBIN NEGATIVE  NEGATIVE   KETONES NEGATIVE  NEGATIVE   OCCULT BLOOD 3+ A NEGATIVE   PROTEIN TRACE A NEGATIVE   NITRITE POSITIVE A NEGATIVE   LEUKOCYTE ESTERASE 2+ A NEGATIVE   WBC > OR = 60 /HPF A < OR = 5   RBC 3-10 /HPF A < OR = 2   SQUAMOUS EPITHELIAL CELLS 0-5 /HPF  < OR = 5   BACTERIA MODERATE /HPF A NONE SEEN   HYALINE CAST NONE SEEN /LPF  NONE SEEN     (Q) MICROALBUMIN, RANDOM URINE (W/CREATININE) [id number] 09:53AM Lonzie Nickel     Test Name Result Flag Reference   CREATININE, RANDOM URINE 104 mg/dL     MICROALBUMIN 11 6 mg/dL     Reference Range  Not established   MICROALBUMIN/CREATININE$RATIO, RANDOM URINE 112 mcg/mg creat H <30   The ADA defines abnormalities in albumin  excretion as follows:     Category         Result (mcg/mg creatinine)     Normal                    <30  Microalbuminuria            Clinical albuminuria   > OR = 300     The ADA recommends that at least two of three  specimens collected within a 3-6 month period be  abnormal before considering a patient to be  within a diagnostic category  Plan  Anemia    · (Q) HEMOGLOBIN + HEMATOCRIT; Status:Active;  Requested IJM:[id number];     Signatures   Electronically signed by : ZUNILDA Pineda ; Jan 11 2017 11:16PM EST                       (Author)

## 2018-02-06 ENCOUNTER — TELEPHONE (OUTPATIENT)
Dept: INTERNAL MEDICINE CLINIC | Facility: CLINIC | Age: 53
End: 2018-02-06

## 2018-02-07 NOTE — TELEPHONE ENCOUNTER
I do not see that I have ordered either the vaccine or the blood test for her  What is the date of service?  I am not really sure what she is referring to

## 2018-02-08 NOTE — TELEPHONE ENCOUNTER
It was the swab we took in the office of the lesions she had   We swabbed her with her consent so she knew then that it was going to be sent out for confirmation of the diagnosis back then

## 2018-04-19 DIAGNOSIS — E08.65 DIABETES MELLITUS DUE TO UNDERLYING CONDITION WITH HYPERGLYCEMIA, WITHOUT LONG-TERM CURRENT USE OF INSULIN (HCC): ICD-10-CM

## 2018-04-19 DIAGNOSIS — J30.9 ALLERGIC RHINITIS, UNSPECIFIED SEASONALITY, UNSPECIFIED TRIGGER: Primary | ICD-10-CM

## 2018-04-19 RX ORDER — MONTELUKAST SODIUM 10 MG/1
TABLET ORAL
Qty: 90 TABLET | Refills: 2 | Status: SHIPPED | OUTPATIENT
Start: 2018-04-19 | End: 2018-08-28 | Stop reason: SDUPTHER

## 2018-04-19 RX ORDER — LANCETS 33 GAUGE
EACH MISCELLANEOUS
Qty: 300 EACH | Refills: 2 | Status: SHIPPED | OUTPATIENT
Start: 2018-04-19 | End: 2018-08-28 | Stop reason: SDUPTHER

## 2018-04-28 DIAGNOSIS — I10 HYPERTENSION, ESSENTIAL, BENIGN: ICD-10-CM

## 2018-04-28 DIAGNOSIS — E78.2 MIXED HYPERLIPIDEMIA: Primary | ICD-10-CM

## 2018-04-30 RX ORDER — ATORVASTATIN CALCIUM 10 MG/1
TABLET, FILM COATED ORAL
Qty: 90 TABLET | Refills: 2 | Status: SHIPPED | OUTPATIENT
Start: 2018-04-30 | End: 2018-08-28 | Stop reason: SDUPTHER

## 2018-04-30 RX ORDER — LISINOPRIL 5 MG/1
TABLET ORAL
Qty: 90 TABLET | Refills: 2 | Status: SHIPPED | OUTPATIENT
Start: 2018-04-30 | End: 2018-08-28 | Stop reason: SDUPTHER

## 2018-07-18 DIAGNOSIS — E11.9 TYPE 2 DIABETES MELLITUS WITHOUT COMPLICATION, WITHOUT LONG-TERM CURRENT USE OF INSULIN (HCC): Primary | ICD-10-CM

## 2018-08-10 ENCOUNTER — OFFICE VISIT (OUTPATIENT)
Dept: INTERNAL MEDICINE CLINIC | Facility: CLINIC | Age: 53
End: 2018-08-10
Payer: COMMERCIAL

## 2018-08-10 VITALS
HEIGHT: 63 IN | SYSTOLIC BLOOD PRESSURE: 132 MMHG | WEIGHT: 178.8 LBS | BODY MASS INDEX: 31.68 KG/M2 | DIASTOLIC BLOOD PRESSURE: 70 MMHG | HEART RATE: 84 BPM | OXYGEN SATURATION: 98 %

## 2018-08-10 DIAGNOSIS — I10 BENIGN ESSENTIAL HYPERTENSION: ICD-10-CM

## 2018-08-10 DIAGNOSIS — Z12.31 ENCOUNTER FOR SCREENING MAMMOGRAM FOR BREAST CANCER: ICD-10-CM

## 2018-08-10 DIAGNOSIS — E11.65 TYPE 2 DIABETES MELLITUS WITH HYPERGLYCEMIA, WITHOUT LONG-TERM CURRENT USE OF INSULIN (HCC): ICD-10-CM

## 2018-08-10 DIAGNOSIS — E78.2 MIXED HYPERLIPIDEMIA: ICD-10-CM

## 2018-08-10 DIAGNOSIS — Z00.00 WELLNESS EXAMINATION: Primary | ICD-10-CM

## 2018-08-10 PROCEDURE — 99396 PREV VISIT EST AGE 40-64: CPT | Performed by: INTERNAL MEDICINE

## 2018-08-10 RX ORDER — GABAPENTIN 300 MG/1
CAPSULE ORAL
COMMUNITY
Start: 2017-09-21 | End: 2021-08-05 | Stop reason: ALTCHOICE

## 2018-08-10 RX ORDER — ALBUTEROL SULFATE 90 UG/1
1-2 AEROSOL, METERED RESPIRATORY (INHALATION)
COMMUNITY
Start: 2013-06-17 | End: 2021-08-05 | Stop reason: SDUPTHER

## 2018-08-10 RX ORDER — LANCETS 33 GAUGE
EACH MISCELLANEOUS
COMMUNITY
Start: 2013-07-25 | End: 2020-09-10 | Stop reason: ALTCHOICE

## 2018-08-10 NOTE — PROGRESS NOTES
Assessment/Plan:  She will get Shingrix vaccine here since vaccines will only be covered here as far as she knows  She will schedule her colonoscopy this year     Problem List Items Addressed This Visit     Benign essential hypertension    Relevant Orders    CBC and differential    Comprehensive metabolic panel    Hemoglobin A1C    TSH, 3rd generation with Free T4 reflex    Microalbumin / creatinine urine ratio    Urinalysis with reflex to microscopic    Vitamin D 25 hydroxy    CBC and differential    Comprehensive metabolic panel    Hemoglobin A1C    Microalbumin / creatinine urine ratio    TSH, 3rd generation with Free T4 reflex    Urinalysis with reflex to microscopic    Hyperlipidemia    Relevant Orders    CBC and differential    Comprehensive metabolic panel    Hemoglobin A1C    TSH, 3rd generation with Free T4 reflex    Microalbumin / creatinine urine ratio    Urinalysis with reflex to microscopic    Vitamin D 25 hydroxy    CBC and differential    Comprehensive metabolic panel    Hemoglobin A1C    Microalbumin / creatinine urine ratio    TSH, 3rd generation with Free T4 reflex    Urinalysis with reflex to microscopic    Type 2 diabetes mellitus with hyperglycemia (HCC)    Relevant Orders    CBC and differential    Comprehensive metabolic panel    Hemoglobin A1C    TSH, 3rd generation with Free T4 reflex    Microalbumin / creatinine urine ratio    Urinalysis with reflex to microscopic    Vitamin D 25 hydroxy    CBC and differential    Comprehensive metabolic panel    Hemoglobin A1C    Microalbumin / creatinine urine ratio    TSH, 3rd generation with Free T4 reflex    Urinalysis with reflex to microscopic      Other Visit Diagnoses     Wellness examination    -  Primary    Relevant Orders    Lipid panel    Glucose, fasting    Lipid panel    Glucose, fasting    Encounter for screening mammogram for breast cancer        Relevant Orders    Mammo screening bilateral w cad            Subjective:      Patient ID: Antonio Erickson is a 45 y o  female  HPI  Here for her wellness  Due for metabolic screening  Due for mammogram  Due for colonoscopy  Up to date with flu , tetanus, whooping cough vaccination, pneumococcal vaccine received from previous PCP  Due for shingles vaccination  Up to date with dental and eye exams    The following portions of the patient's history were reviewed and updated as appropriate: allergies, current medications, past family history, past social history, past surgical history and problem list     Review of Systems   Constitutional: Negative for fatigue, fever and unexpected weight change  HENT: Negative for ear pain, hearing loss, sinus pain, sinus pressure and sore throat  Respiratory: Negative for cough, shortness of breath and wheezing  Cardiovascular: Negative for chest pain, palpitations and leg swelling  Gastrointestinal: Negative for abdominal pain, blood in stool, constipation, diarrhea, nausea and vomiting  Endocrine: Negative for polydipsia and polyuria  Genitourinary: Negative for difficulty urinating and dysuria  Musculoskeletal: Negative for arthralgias and myalgias  Neurological: Positive for headaches (occ migraines)  Negative for dizziness  Objective:      /70   Pulse 84   Ht 5' 3 2" (1 605 m)   Wt 81 1 kg (178 lb 12 8 oz)   SpO2 98%   BMI 31 47 kg/m²          Physical Exam   Constitutional: She is oriented to person, place, and time  She appears well-developed and well-nourished  HENT:   Head: Normocephalic and atraumatic  Right Ear: External ear normal    Left Ear: External ear normal    Mouth/Throat: Oropharynx is clear and moist    Eyes: Conjunctivae are normal    Neck: Neck supple  Cardiovascular: Normal rate, regular rhythm and normal heart sounds  No murmur heard  Pulses:       Dorsalis pedis pulses are 2+ on the right side, and 2+ on the left side     Pulmonary/Chest: Effort normal and breath sounds normal  No respiratory distress  She has no wheezes  She has no rales  Abdominal: Soft  Bowel sounds are normal  She exhibits no distension and no mass  There is no tenderness  There is no rebound and no guarding  Musculoskeletal: Normal range of motion  Feet:   Right Foot:   Skin Integrity: Negative for ulcer, skin breakdown, erythema, warmth, callus or dry skin  Left Foot:   Skin Integrity: Negative for ulcer, skin breakdown, erythema, warmth, callus or dry skin  Neurological: She is alert and oriented to person, place, and time  Skin: Skin is warm and dry  Psychiatric: She has a normal mood and affect  Her behavior is normal  Judgment and thought content normal      Patient's shoes and socks removed  Right Foot/Ankle   Right Foot Inspection  Skin Exam: skin normal and skin intact no dry skin, no warmth, no callus, no erythema, no maceration, no abnormal color, no pre-ulcer, no ulcer and no callus                          Toe Exam: ROM and strength within normal limits  Sensory   Vibration: intact    Monofilament testing: intact  Vascular    The right DP pulse is 2+  Left Foot/Ankle  Left Foot Inspection  Skin Exam: skin normal and skin intactno dry skin, no warmth, no erythema, no maceration, normal color, no pre-ulcer, no ulcer and no callus                         Toe Exam: ROM and strength within normal limits                   Sensory   Vibration: intact    Monofilament: intact  Vascular    The left DP pulse is 2+  Assign Risk Category:  No deformity present;  No loss of protective sensation;        Risk: 0

## 2018-08-13 LAB
25(OH)D3 SERPL-MCNC: 30 NG/ML (ref 30–100)
ALBUMIN SERPL-MCNC: 4.6 G/DL (ref 3.6–5.1)
ALBUMIN/CREAT UR: 232 MCG/MG CREAT
ALBUMIN/GLOB SERPL: 1.7 (CALC) (ref 1–2.5)
ALP SERPL-CCNC: 117 U/L (ref 33–130)
ALT SERPL-CCNC: 29 U/L (ref 6–29)
AST SERPL-CCNC: 25 U/L (ref 10–35)
BASOPHILS # BLD AUTO: 49 CELLS/UL (ref 0–200)
BASOPHILS NFR BLD AUTO: 0.5 %
BILIRUB SERPL-MCNC: 0.3 MG/DL (ref 0.2–1.2)
BUN SERPL-MCNC: 24 MG/DL (ref 7–25)
BUN/CREAT SERPL: ABNORMAL (CALC) (ref 6–22)
CALCIUM SERPL-MCNC: 10.2 MG/DL (ref 8.6–10.4)
CHLORIDE SERPL-SCNC: 100 MMOL/L (ref 98–110)
CHOLEST SERPL-MCNC: 138 MG/DL
CHOLEST/HDLC SERPL: 3.7 (CALC)
CO2 SERPL-SCNC: 26 MMOL/L (ref 20–32)
CREAT SERPL-MCNC: 0.73 MG/DL (ref 0.5–1.05)
CREAT UR-MCNC: 105 MG/DL (ref 20–320)
EOSINOPHIL # BLD AUTO: 127 CELLS/UL (ref 15–500)
EOSINOPHIL NFR BLD AUTO: 1.3 %
ERYTHROCYTE [DISTWIDTH] IN BLOOD BY AUTOMATED COUNT: 13.4 % (ref 11–15)
GLOBULIN SER CALC-MCNC: 2.7 G/DL (CALC) (ref 1.9–3.7)
GLUCOSE P FAST SERPL-MCNC: 106 MG/DL (ref 65–99)
GLUCOSE SERPL-MCNC: 115 MG/DL (ref 65–99)
HBA1C MFR BLD: 7.1 % OF TOTAL HGB
HCT VFR BLD AUTO: 34.6 % (ref 35–45)
HDLC SERPL-MCNC: 37 MG/DL
HGB BLD-MCNC: 11.6 G/DL (ref 11.7–15.5)
LDLC SERPL CALC-MCNC: 63 MG/DL (CALC)
LYMPHOCYTES # BLD AUTO: 4067 CELLS/UL (ref 850–3900)
LYMPHOCYTES NFR BLD AUTO: 41.5 %
MCH RBC QN AUTO: 28.3 PG (ref 27–33)
MCHC RBC AUTO-ENTMCNC: 33.5 G/DL (ref 32–36)
MCV RBC AUTO: 84.4 FL (ref 80–100)
MICROALBUMIN UR-MCNC: 24.4 MG/DL
MONOCYTES # BLD AUTO: 392 CELLS/UL (ref 200–950)
MONOCYTES NFR BLD AUTO: 4 %
NEUTROPHILS # BLD AUTO: 5165 CELLS/UL (ref 1500–7800)
NEUTROPHILS NFR BLD AUTO: 52.7 %
NONHDLC SERPL-MCNC: 101 MG/DL (CALC)
PLATELET # BLD AUTO: 419 THOUSAND/UL (ref 140–400)
PMV BLD REES-ECKER: 9.4 FL (ref 7.5–12.5)
POTASSIUM SERPL-SCNC: 4.1 MMOL/L (ref 3.5–5.3)
PROT SERPL-MCNC: 7.3 G/DL (ref 6.1–8.1)
RBC # BLD AUTO: 4.1 MILLION/UL (ref 3.8–5.1)
SL AMB EGFR AFRICAN AMERICAN: 108 ML/MIN/1.73M2
SL AMB EGFR NON AFRICAN AMERICAN: 93 ML/MIN/1.73M2
SODIUM SERPL-SCNC: 139 MMOL/L (ref 135–146)
TRIGL SERPL-MCNC: 287 MG/DL
TSH SERPL-ACNC: 1.29 MIU/L
WBC # BLD AUTO: 9.8 THOUSAND/UL (ref 3.8–10.8)

## 2018-08-17 ENCOUNTER — TELEPHONE (OUTPATIENT)
Dept: INTERNAL MEDICINE CLINIC | Facility: CLINIC | Age: 53
End: 2018-08-17

## 2018-08-17 NOTE — TELEPHONE ENCOUNTER
Per Dr Paul Watts, there is nothing to do for this level-urine microalbumin-right now  Recommended she come in for appt to discuss results/tx with pcp or NP but patient declined appt at this time  Pt states she will wait until she comes back from vacation to hear back from Dr Kasey APONTE

## 2018-08-28 DIAGNOSIS — E08.65 DIABETES MELLITUS DUE TO UNDERLYING CONDITION WITH HYPERGLYCEMIA, WITHOUT LONG-TERM CURRENT USE OF INSULIN (HCC): ICD-10-CM

## 2018-08-28 DIAGNOSIS — J30.9 ALLERGIC RHINITIS, UNSPECIFIED SEASONALITY, UNSPECIFIED TRIGGER: ICD-10-CM

## 2018-08-28 DIAGNOSIS — E11.9 TYPE 2 DIABETES MELLITUS WITHOUT COMPLICATION, WITHOUT LONG-TERM CURRENT USE OF INSULIN (HCC): ICD-10-CM

## 2018-08-28 DIAGNOSIS — E78.2 MIXED HYPERLIPIDEMIA: ICD-10-CM

## 2018-08-28 DIAGNOSIS — I10 HYPERTENSION, ESSENTIAL, BENIGN: ICD-10-CM

## 2018-08-28 RX ORDER — LISINOPRIL 5 MG/1
5 TABLET ORAL DAILY
Qty: 90 TABLET | Refills: 3 | Status: SHIPPED | OUTPATIENT
Start: 2018-08-28 | End: 2019-09-30 | Stop reason: SDUPTHER

## 2018-08-28 RX ORDER — MONTELUKAST SODIUM 10 MG/1
10 TABLET ORAL EVERY EVENING
Qty: 90 TABLET | Refills: 3 | Status: SHIPPED | OUTPATIENT
Start: 2018-08-28 | End: 2019-09-18 | Stop reason: SDUPTHER

## 2018-08-28 RX ORDER — LANCETS 33 GAUGE
EACH MISCELLANEOUS 3 TIMES DAILY
Qty: 300 EACH | Refills: 1 | Status: SHIPPED | OUTPATIENT
Start: 2018-08-28 | End: 2020-09-11 | Stop reason: SDUPTHER

## 2018-08-28 RX ORDER — MONTELUKAST SODIUM 10 MG/1
10 TABLET ORAL EVERY EVENING
Qty: 90 TABLET | Refills: 3 | Status: SHIPPED | OUTPATIENT
Start: 2018-08-28 | End: 2018-08-28 | Stop reason: SDUPTHER

## 2018-08-28 RX ORDER — ATORVASTATIN CALCIUM 10 MG/1
10 TABLET, FILM COATED ORAL
Qty: 90 TABLET | Refills: 3 | Status: SHIPPED | OUTPATIENT
Start: 2018-08-28 | End: 2019-09-30 | Stop reason: SDUPTHER

## 2018-08-31 DIAGNOSIS — D50.9 IRON DEFICIENCY ANEMIA, UNSPECIFIED IRON DEFICIENCY ANEMIA TYPE: Primary | ICD-10-CM

## 2018-08-31 DIAGNOSIS — R80.9 PROTEINURIA, UNSPECIFIED TYPE: ICD-10-CM

## 2018-08-31 DIAGNOSIS — D64.9 ANEMIA, UNSPECIFIED TYPE: Primary | ICD-10-CM

## 2018-09-12 DIAGNOSIS — R31.9 URINARY TRACT INFECTION WITH HEMATURIA, SITE UNSPECIFIED: Primary | ICD-10-CM

## 2018-09-12 DIAGNOSIS — D64.9 ANEMIA, UNSPECIFIED TYPE: ICD-10-CM

## 2018-09-12 DIAGNOSIS — N39.0 URINARY TRACT INFECTION WITH HEMATURIA, SITE UNSPECIFIED: Primary | ICD-10-CM

## 2018-09-12 RX ORDER — CIPROFLOXACIN 250 MG/1
250 TABLET, FILM COATED ORAL EVERY 12 HOURS SCHEDULED
Qty: 14 TABLET | Refills: 0 | Status: SHIPPED | OUTPATIENT
Start: 2018-09-12 | End: 2018-09-19

## 2018-09-13 LAB
APPEARANCE UR: ABNORMAL
BACTERIA UR QL AUTO: ABNORMAL /HPF
BILIRUB UR QL STRIP: NEGATIVE
COLOR UR: YELLOW
GLUCOSE UR QL STRIP: NEGATIVE
HCT VFR BLD AUTO: 34.3 % (ref 35–45)
HGB BLD-MCNC: 11.4 G/DL (ref 11.7–15.5)
HGB UR QL STRIP: ABNORMAL
HYALINE CASTS #/AREA URNS LPF: ABNORMAL /LPF
KETONES UR QL STRIP: NEGATIVE
LEUKOCYTE ESTERASE UR QL STRIP: ABNORMAL
NITRITE UR QL STRIP: POSITIVE
PH UR STRIP: ABNORMAL [PH] (ref 5–8)
PROT UR QL STRIP: ABNORMAL
RBC #/AREA URNS HPF: ABNORMAL /HPF
SP GR UR STRIP: 1.02 (ref 1–1.03)
SQUAMOUS #/AREA URNS HPF: ABNORMAL /HPF
WBC #/AREA URNS HPF: ABNORMAL /HPF

## 2018-10-25 LAB
APPEARANCE UR: ABNORMAL
BACTERIA UR QL AUTO: ABNORMAL /HPF
BILIRUB UR QL STRIP: NEGATIVE
COLOR UR: YELLOW
GLUCOSE UR QL STRIP: NEGATIVE
HCT VFR BLD AUTO: 34 % (ref 35–45)
HGB BLD-MCNC: 11.3 G/DL (ref 11.7–15.5)
HGB UR QL STRIP: ABNORMAL
HYALINE CASTS #/AREA URNS LPF: ABNORMAL /LPF
KETONES UR QL STRIP: NEGATIVE
LEUKOCYTE ESTERASE UR QL STRIP: ABNORMAL
NITRITE UR QL STRIP: POSITIVE
PH UR STRIP: ABNORMAL [PH] (ref 5–8)
PROT UR QL STRIP: ABNORMAL
RBC #/AREA URNS HPF: ABNORMAL /HPF
SP GR UR STRIP: 1.02 (ref 1–1.03)
SQUAMOUS #/AREA URNS HPF: ABNORMAL /HPF
WBC #/AREA URNS HPF: ABNORMAL /HPF

## 2018-10-31 ENCOUNTER — IMMUNIZATION (OUTPATIENT)
Dept: INTERNAL MEDICINE CLINIC | Facility: CLINIC | Age: 53
End: 2018-10-31
Payer: COMMERCIAL

## 2018-10-31 DIAGNOSIS — Z23 NEED FOR INFLUENZA VACCINATION: Primary | ICD-10-CM

## 2018-10-31 PROCEDURE — 90471 IMMUNIZATION ADMIN: CPT

## 2018-10-31 PROCEDURE — 90682 RIV4 VACC RECOMBINANT DNA IM: CPT

## 2019-01-09 LAB
LEFT EYE DIABETIC RETINOPATHY: NORMAL
RIGHT EYE DIABETIC RETINOPATHY: NORMAL

## 2019-01-31 ENCOUNTER — HOSPITAL ENCOUNTER (OUTPATIENT)
Dept: BONE DENSITY | Facility: IMAGING CENTER | Age: 54
Discharge: HOME | End: 2019-01-31
Payer: COMMERCIAL

## 2019-01-31 VITALS — WEIGHT: 183 LBS | HEIGHT: 64 IN | BODY MASS INDEX: 31.24 KG/M2

## 2019-01-31 DIAGNOSIS — Z12.31 ENCOUNTER FOR SCREENING MAMMOGRAM FOR BREAST CANCER: ICD-10-CM

## 2019-01-31 PROCEDURE — 77067 SCR MAMMO BI INCL CAD: CPT

## 2019-03-22 DIAGNOSIS — E08.65 DIABETES MELLITUS DUE TO UNDERLYING CONDITION WITH HYPERGLYCEMIA, WITHOUT LONG-TERM CURRENT USE OF INSULIN (HCC): ICD-10-CM

## 2019-03-22 RX ORDER — LANCETS 33 GAUGE
EACH MISCELLANEOUS
Qty: 300 EACH | Refills: 1 | Status: SHIPPED | OUTPATIENT
Start: 2019-03-22 | End: 2021-09-30

## 2019-05-03 ENCOUNTER — TELEPHONE (OUTPATIENT)
Dept: INTERNAL MEDICINE CLINIC | Facility: CLINIC | Age: 54
End: 2019-05-03

## 2019-05-09 ENCOUNTER — CLINICAL SUPPORT (OUTPATIENT)
Dept: INTERNAL MEDICINE CLINIC | Facility: CLINIC | Age: 54
End: 2019-05-09
Payer: COMMERCIAL

## 2019-05-09 DIAGNOSIS — Z23 NEED FOR VACCINATION: Primary | ICD-10-CM

## 2019-05-09 PROCEDURE — 90750 HZV VACC RECOMBINANT IM: CPT

## 2019-05-09 PROCEDURE — 90471 IMMUNIZATION ADMIN: CPT

## 2019-07-19 ENCOUNTER — TELEPHONE (OUTPATIENT)
Dept: INTERNAL MEDICINE CLINIC | Facility: CLINIC | Age: 54
End: 2019-07-19

## 2019-07-19 ENCOUNTER — CLINICAL SUPPORT (OUTPATIENT)
Dept: INTERNAL MEDICINE CLINIC | Facility: CLINIC | Age: 54
End: 2019-07-19
Payer: COMMERCIAL

## 2019-07-19 DIAGNOSIS — R39.9 UTI SYMPTOMS: Primary | ICD-10-CM

## 2019-07-19 DIAGNOSIS — Z23 NEED FOR SHINGLES VACCINE: Primary | ICD-10-CM

## 2019-07-19 PROCEDURE — 90750 HZV VACC RECOMBINANT IM: CPT

## 2019-07-19 PROCEDURE — 90471 IMMUNIZATION ADMIN: CPT

## 2019-07-22 DIAGNOSIS — N30.00 ACUTE CYSTITIS WITHOUT HEMATURIA: Primary | ICD-10-CM

## 2019-07-22 RX ORDER — CIPROFLOXACIN 250 MG/1
250 TABLET, FILM COATED ORAL EVERY 12 HOURS SCHEDULED
Qty: 14 TABLET | Refills: 0 | Status: SHIPPED | OUTPATIENT
Start: 2019-07-22 | End: 2019-07-29

## 2019-07-23 LAB
APPEARANCE UR: ABNORMAL
BACTERIA UR QL AUTO: ABNORMAL /HPF
BILIRUB UR QL STRIP: NEGATIVE
COLOR UR: YELLOW
GLUCOSE UR QL STRIP: NEGATIVE
HGB UR QL STRIP: ABNORMAL
HYALINE CASTS #/AREA URNS LPF: ABNORMAL /LPF
KETONES UR QL STRIP: NEGATIVE
LEUKOCYTE ESTERASE UR QL STRIP: ABNORMAL
NITRITE UR QL STRIP: NEGATIVE
PH UR STRIP: 6 [PH] (ref 5–8)
PROT UR QL STRIP: ABNORMAL
RBC #/AREA URNS HPF: ABNORMAL /HPF
SP GR UR STRIP: 1.01 (ref 1–1.03)
SQUAMOUS #/AREA URNS HPF: ABNORMAL /HPF
WBC #/AREA URNS HPF: ABNORMAL /HPF

## 2019-09-18 DIAGNOSIS — J30.9 ALLERGIC RHINITIS, UNSPECIFIED SEASONALITY, UNSPECIFIED TRIGGER: ICD-10-CM

## 2019-09-18 DIAGNOSIS — E08.65 DIABETES MELLITUS DUE TO UNDERLYING CONDITION WITH HYPERGLYCEMIA, WITHOUT LONG-TERM CURRENT USE OF INSULIN (HCC): ICD-10-CM

## 2019-09-18 RX ORDER — LANCETS 33 GAUGE
EACH MISCELLANEOUS
Qty: 300 EACH | Refills: 3 | Status: SHIPPED | OUTPATIENT
Start: 2019-09-18 | End: 2020-09-12

## 2019-09-18 RX ORDER — MONTELUKAST SODIUM 10 MG/1
TABLET ORAL
Qty: 90 TABLET | Refills: 0 | Status: SHIPPED | OUTPATIENT
Start: 2019-09-18 | End: 2020-06-04

## 2019-09-30 DIAGNOSIS — I10 HYPERTENSION, ESSENTIAL, BENIGN: ICD-10-CM

## 2019-09-30 DIAGNOSIS — E78.2 MIXED HYPERLIPIDEMIA: ICD-10-CM

## 2019-10-01 RX ORDER — LISINOPRIL 5 MG/1
TABLET ORAL
Qty: 90 TABLET | Refills: 3 | Status: SHIPPED | OUTPATIENT
Start: 2019-10-01 | End: 2020-09-11 | Stop reason: SDUPTHER

## 2019-10-01 RX ORDER — ATORVASTATIN CALCIUM 10 MG/1
TABLET, FILM COATED ORAL
Qty: 90 TABLET | Refills: 3 | Status: SHIPPED | OUTPATIENT
Start: 2019-10-01 | End: 2020-09-11 | Stop reason: SDUPTHER

## 2020-04-29 DIAGNOSIS — E11.9 TYPE 2 DIABETES MELLITUS WITHOUT COMPLICATION, WITHOUT LONG-TERM CURRENT USE OF INSULIN (HCC): ICD-10-CM

## 2020-06-04 DIAGNOSIS — J30.9 ALLERGIC RHINITIS, UNSPECIFIED SEASONALITY, UNSPECIFIED TRIGGER: ICD-10-CM

## 2020-06-04 RX ORDER — MONTELUKAST SODIUM 10 MG/1
TABLET ORAL
Qty: 90 TABLET | Refills: 3 | Status: SHIPPED | OUTPATIENT
Start: 2020-06-04 | End: 2020-09-11 | Stop reason: SDUPTHER

## 2020-09-11 ENCOUNTER — OFFICE VISIT (OUTPATIENT)
Dept: INTERNAL MEDICINE CLINIC | Facility: CLINIC | Age: 55
End: 2020-09-11
Payer: COMMERCIAL

## 2020-09-11 VITALS
TEMPERATURE: 97.6 F | DIASTOLIC BLOOD PRESSURE: 74 MMHG | WEIGHT: 177 LBS | BODY MASS INDEX: 30.22 KG/M2 | HEART RATE: 93 BPM | SYSTOLIC BLOOD PRESSURE: 140 MMHG | OXYGEN SATURATION: 98 % | HEIGHT: 64 IN

## 2020-09-11 DIAGNOSIS — Z00.00 WELLNESS EXAMINATION: Primary | ICD-10-CM

## 2020-09-11 DIAGNOSIS — Z12.31 ENCOUNTER FOR SCREENING MAMMOGRAM FOR BREAST CANCER: ICD-10-CM

## 2020-09-11 DIAGNOSIS — I10 BENIGN ESSENTIAL HYPERTENSION: ICD-10-CM

## 2020-09-11 DIAGNOSIS — E11.9 TYPE 2 DIABETES MELLITUS WITHOUT COMPLICATION, WITHOUT LONG-TERM CURRENT USE OF INSULIN (HCC): ICD-10-CM

## 2020-09-11 DIAGNOSIS — E78.2 MIXED HYPERLIPIDEMIA: ICD-10-CM

## 2020-09-11 DIAGNOSIS — E08.65 DIABETES MELLITUS DUE TO UNDERLYING CONDITION WITH HYPERGLYCEMIA, WITHOUT LONG-TERM CURRENT USE OF INSULIN (HCC): ICD-10-CM

## 2020-09-11 DIAGNOSIS — J30.9 ALLERGIC RHINITIS, UNSPECIFIED SEASONALITY, UNSPECIFIED TRIGGER: ICD-10-CM

## 2020-09-11 DIAGNOSIS — E11.65 TYPE 2 DIABETES MELLITUS WITH HYPERGLYCEMIA, WITHOUT LONG-TERM CURRENT USE OF INSULIN (HCC): ICD-10-CM

## 2020-09-11 DIAGNOSIS — Z23 NEED FOR VACCINATION: ICD-10-CM

## 2020-09-11 LAB — SL AMB POCT HEMOGLOBIN AIC: 9.5 (ref ?–6.5)

## 2020-09-11 PROCEDURE — 3077F SYST BP >= 140 MM HG: CPT | Performed by: INTERNAL MEDICINE

## 2020-09-11 PROCEDURE — 99396 PREV VISIT EST AGE 40-64: CPT | Performed by: INTERNAL MEDICINE

## 2020-09-11 PROCEDURE — 90471 IMMUNIZATION ADMIN: CPT

## 2020-09-11 PROCEDURE — 3046F HEMOGLOBIN A1C LEVEL >9.0%: CPT | Performed by: INTERNAL MEDICINE

## 2020-09-11 PROCEDURE — 3725F SCREEN DEPRESSION PERFORMED: CPT | Performed by: INTERNAL MEDICINE

## 2020-09-11 PROCEDURE — 83036 HEMOGLOBIN GLYCOSYLATED A1C: CPT | Performed by: INTERNAL MEDICINE

## 2020-09-11 PROCEDURE — 3078F DIAST BP <80 MM HG: CPT | Performed by: INTERNAL MEDICINE

## 2020-09-11 PROCEDURE — 4010F ACE/ARB THERAPY RXD/TAKEN: CPT | Performed by: INTERNAL MEDICINE

## 2020-09-11 PROCEDURE — 1036F TOBACCO NON-USER: CPT | Performed by: INTERNAL MEDICINE

## 2020-09-11 PROCEDURE — 90682 RIV4 VACC RECOMBINANT DNA IM: CPT

## 2020-09-11 RX ORDER — METFORMIN HYDROCHLORIDE 500 MG/1
1000 TABLET, EXTENDED RELEASE ORAL 2 TIMES DAILY WITH MEALS
Qty: 360 TABLET | Refills: 3 | Status: SHIPPED | OUTPATIENT
Start: 2020-09-11 | End: 2021-08-05 | Stop reason: SDUPTHER

## 2020-09-11 RX ORDER — ATORVASTATIN CALCIUM 10 MG/1
10 TABLET, FILM COATED ORAL
Qty: 90 TABLET | Refills: 3 | Status: SHIPPED | OUTPATIENT
Start: 2020-09-11 | End: 2021-08-05 | Stop reason: SDUPTHER

## 2020-09-11 RX ORDER — BLOOD SUGAR DIAGNOSTIC
1 STRIP MISCELLANEOUS 3 TIMES DAILY
Qty: 300 EACH | Refills: 3 | Status: SHIPPED | OUTPATIENT
Start: 2020-09-11 | End: 2021-09-30 | Stop reason: ALTCHOICE

## 2020-09-11 RX ORDER — LANCETS 33 GAUGE
EACH MISCELLANEOUS 3 TIMES DAILY
Qty: 300 EACH | Refills: 3 | Status: SHIPPED | OUTPATIENT
Start: 2020-09-11 | End: 2021-09-30

## 2020-09-11 RX ORDER — LISINOPRIL 5 MG/1
5 TABLET ORAL DAILY
Qty: 90 TABLET | Refills: 3 | Status: SHIPPED | OUTPATIENT
Start: 2020-09-11 | End: 2021-08-05 | Stop reason: SDUPTHER

## 2020-09-11 RX ORDER — MONTELUKAST SODIUM 10 MG/1
10 TABLET ORAL EVERY EVENING
Qty: 90 TABLET | Refills: 3 | Status: SHIPPED | OUTPATIENT
Start: 2020-09-11 | End: 2021-08-05 | Stop reason: SDUPTHER

## 2020-09-11 NOTE — PROGRESS NOTES
Assessment/Plan:  Switch to metformin XL due to diarrhea  Obtain labs  Eye exam is already scheduled       Problem List Items Addressed This Visit        Endocrine    Type 2 diabetes mellitus with hyperglycemia ([facility] [address] )    Relevant Medications    metFORMIN (GLUCOPHAGE-XR) 500 mg 24 hr tablet    Diabetes mellitus due to underlying condition with hyperglycemia, without long-term current use of insulin (HCC)    Relevant Medications    OneTouch Delica Lancets 89B MISC    glucose blood (OneTouch Ultra) test strip    metFORMIN (GLUCOPHAGE-XR) 500 mg 24 hr tablet    Other Relevant Orders    POCT hemoglobin A1c (Completed)    CBC and Platelet    Comprehensive metabolic panel    Lipid Panel with Direct LDL reflex    Microalbumin / creatinine urine ratio    TSH, 3rd generation with Free T4 reflex    Type 2 diabetes mellitus without complication, without long-term current use of insulin (HCC)    Relevant Medications    metFORMIN (GLUCOPHAGE-XR) 500 mg 24 hr tablet       Cardiovascular and Mediastinum    Benign essential hypertension    Relevant Medications    lisinopril (ZESTRIL) 5 mg tablet       Other    Hyperlipidemia    Relevant Medications    atorvastatin (LIPITOR) 10 mg tablet      Other Visit Diagnoses     Wellness examination    -  Primary    Allergic rhinitis, unspecified seasonality, unspecified trigger        Relevant Medications    montelukast (SINGULAIR) 10 mg tablet    Encounter for screening mammogram for breast cancer        Relevant Orders    Mammo screening bilateral w 3d & cad    Need for vaccination        Relevant Orders    influenza vaccine, quadrivalent, recombinant, PF, 0 5 mL, for patients 18 yr+ (FLUBLOK) (Completed)            Subjective:      Patient ID: Selam Christensen is a 62 y o  female  HPI  Here for a follow up  Last seen in 2018   She has been moving around a lot for her 's job and taking care of her mother in law  They are now all settled in back in [address] has been poor  She takes metformin for DM but would like to try XL because she experiences diarrhea  Sugars have been in the 200-300s    The following portions of the patient's history were reviewed and updated as appropriate: allergies, current medications, past family history, past medical history, past social history, past surgical history and problem list     Review of Systems   Constitutional: Negative for chills, fever and unexpected weight change  HENT: Negative for congestion, rhinorrhea and sore throat  Respiratory: Positive for chest tightness (asthmatic and uses her inhalers)  Negative for cough, shortness of breath and wheezing  Cardiovascular: Negative for chest pain, palpitations and leg swelling  Gastrointestinal: Negative for abdominal pain, constipation and diarrhea  Endocrine: Positive for polyuria  Negative for polydipsia  Genitourinary: Negative for difficulty urinating and dysuria  Musculoskeletal: Positive for myalgias (LE)  Negative for arthralgias  Neurological: Positive for headaches  Negative for dizziness  Psychiatric/Behavioral: Positive for sleep disturbance (trouble falling asleep)  Negative for dysphoric mood  The patient is not nervous/anxious  Objective:      /74   Pulse 93   Temp 97 6 °F (36 4 °C)   Ht 5' 4" (1 626 m)   Wt 80 3 kg (177 lb)   SpO2 98%   BMI 30 38 kg/m²          Physical Exam  Constitutional:       General: She is not in acute distress  Appearance: Normal appearance  She is well-developed  She is not ill-appearing, toxic-appearing or diaphoretic  HENT:      Head: Normocephalic and atraumatic  Right Ear: External ear normal  There is no impacted cerumen  Left Ear: External ear normal  There is no impacted cerumen  Eyes:      Conjunctiva/sclera: Conjunctivae normal    Neck:      Musculoskeletal: Neck supple  Cardiovascular:      Rate and Rhythm: Normal rate and regular rhythm        Pulses:           Dorsalis pedis pulses are 2+ on the right side and 2+ on the left side  Heart sounds: No murmur  Pulmonary:      Effort: Pulmonary effort is normal  No respiratory distress  Breath sounds: Normal breath sounds  No wheezing or rales  Abdominal:      General: Bowel sounds are normal  There is no distension  Palpations: Abdomen is soft  There is no mass  Tenderness: There is no abdominal tenderness  There is no guarding or rebound  Musculoskeletal:      Right lower leg: No edema  Left lower leg: No edema  Feet:      Right foot:      Skin integrity: No ulcer, skin breakdown, erythema, warmth, callus or dry skin  Left foot:      Skin integrity: No ulcer, skin breakdown, erythema, warmth, callus or dry skin  Skin:     General: Skin is warm and dry  Neurological:      Mental Status: She is alert and oriented to person, place, and time  Psychiatric:         Behavior: Behavior normal          Thought Content: Thought content normal          Judgment: Judgment normal        Patient's shoes and socks removed  Right Foot/Ankle   Right Foot Inspection  Skin Exam: skin normal and skin intact no dry skin, no warmth, no callus, no erythema, no maceration, no abnormal color, no pre-ulcer, no ulcer and no callus                          Toe Exam: ROM and strength within normal limits  Sensory       Monofilament testing: intact  Vascular    The right DP pulse is 2+  Left Foot/Ankle  Left Foot Inspection  Skin Exam: skin normal and skin intactno dry skin, no warmth, no erythema, no maceration, normal color, no pre-ulcer, no ulcer and no callus                         Toe Exam: ROM and strength within normal limits                   Sensory       Monofilament: intact  Vascular    The left DP pulse is 2+  Assign Risk Category:  No deformity present;  No loss of protective sensation;        Risk: 0

## 2020-09-12 DIAGNOSIS — E08.65 DIABETES MELLITUS DUE TO UNDERLYING CONDITION WITH HYPERGLYCEMIA, WITHOUT LONG-TERM CURRENT USE OF INSULIN (HCC): ICD-10-CM

## 2020-09-12 RX ORDER — LANCETS 33 GAUGE
EACH MISCELLANEOUS
Qty: 300 EACH | Refills: 3 | Status: SHIPPED | OUTPATIENT
Start: 2020-09-12 | End: 2021-08-05 | Stop reason: SDUPTHER

## 2020-09-12 RX ORDER — BLOOD SUGAR DIAGNOSTIC
STRIP MISCELLANEOUS
Qty: 300 EACH | Refills: 3 | Status: SHIPPED | OUTPATIENT
Start: 2020-09-12 | End: 2021-08-05 | Stop reason: SDUPTHER

## 2020-09-23 ENCOUNTER — TELEPHONE (OUTPATIENT)
Dept: INTERNAL MEDICINE CLINIC | Facility: CLINIC | Age: 55
End: 2020-09-23

## 2020-09-23 NOTE — TELEPHONE ENCOUNTER
Pt called in about her appt on 9/11  She said it was supposed to be a wellness exam but was billed as a regular office visit and she received a bill  Was this done as a wellness and can we switch the code to a wellness code? Please advise  Pt is also questioning the A1C that she got done at [address] [place] [address]  She is also getting charged for that  She said she questioned this at the office and she was told it would be covered because she is a diabetic  Looks like it had the correct dx code so we will to check with the billing dept and her insurance co on this

## 2020-09-23 NOTE — TELEPHONE ENCOUNTER
I changed the visit to a wellness  I am not sure about the blood work if the code needs to be changed

## 2020-09-24 NOTE — TELEPHONE ENCOUNTER
Left detailed message that the visit code was changed to a wellness and billing will re-submit to insurance  A1C was applied to patient's deductible

## 2020-12-19 LAB
ALBUMIN SERPL-MCNC: 4.4 G/DL (ref 3.6–5.1)
ALBUMIN/GLOB SERPL: 1.8 (CALC) (ref 1–2.5)
ALP SERPL-CCNC: 110 U/L (ref 37–153)
ALT SERPL-CCNC: 29 U/L (ref 6–29)
AST SERPL-CCNC: 18 U/L (ref 10–35)
BASOPHILS # BLD AUTO: 48 CELLS/UL (ref 0–200)
BASOPHILS NFR BLD AUTO: 0.4 %
BILIRUB SERPL-MCNC: 0.2 MG/DL (ref 0.2–1.2)
BUN SERPL-MCNC: 16 MG/DL (ref 7–25)
BUN/CREAT SERPL: ABNORMAL (CALC) (ref 6–22)
CALCIUM SERPL-MCNC: 10.1 MG/DL (ref 8.6–10.4)
CHLORIDE SERPL-SCNC: 101 MMOL/L (ref 98–110)
CHOLEST SERPL-MCNC: 186 MG/DL
CHOLEST/HDLC SERPL: 4.8 (CALC)
CO2 SERPL-SCNC: 23 MMOL/L (ref 20–32)
CREAT SERPL-MCNC: 0.64 MG/DL (ref 0.5–1.05)
EOSINOPHIL # BLD AUTO: 290 CELLS/UL (ref 15–500)
EOSINOPHIL NFR BLD AUTO: 2.4 %
ERYTHROCYTE [DISTWIDTH] IN BLOOD BY AUTOMATED COUNT: 12.8 % (ref 11–15)
GLOBULIN SER CALC-MCNC: 2.4 G/DL (CALC) (ref 1.9–3.7)
GLUCOSE SERPL-MCNC: 234 MG/DL (ref 65–99)
HCT VFR BLD AUTO: 34.7 % (ref 35–45)
HDLC SERPL-MCNC: 39 MG/DL
HGB BLD-MCNC: 11.2 G/DL (ref 11.7–15.5)
LDLC SERPL DIRECT ASSAY-MCNC: 77 MG/DL
LYMPHOCYTES # BLD AUTO: 5481 CELLS/UL (ref 850–3900)
LYMPHOCYTES NFR BLD AUTO: 45.3 %
MCH RBC QN AUTO: 27.7 PG (ref 27–33)
MCHC RBC AUTO-ENTMCNC: 32.3 G/DL (ref 32–36)
MCV RBC AUTO: 85.9 FL (ref 80–100)
MONOCYTES # BLD AUTO: 932 CELLS/UL (ref 200–950)
MONOCYTES NFR BLD AUTO: 7.7 %
NEUTROPHILS # BLD AUTO: 5348 CELLS/UL (ref 1500–7800)
NEUTROPHILS NFR BLD AUTO: 44.2 %
NONHDLC SERPL-MCNC: 147 MG/DL (CALC)
PLATELET # BLD AUTO: 361 THOUSAND/UL (ref 140–400)
PMV BLD REES-ECKER: 10.1 FL (ref 7.5–12.5)
POTASSIUM SERPL-SCNC: 4.5 MMOL/L (ref 3.5–5.3)
PROT SERPL-MCNC: 6.8 G/DL (ref 6.1–8.1)
RBC # BLD AUTO: 4.04 MILLION/UL (ref 3.8–5.1)
SL AMB EGFR AFRICAN AMERICAN: 116 ML/MIN/1.73M2
SL AMB EGFR NON AFRICAN AMERICAN: 100 ML/MIN/1.73M2
SODIUM SERPL-SCNC: 137 MMOL/L (ref 135–146)
TRIGL SERPL-MCNC: 512 MG/DL
TSH SERPL-ACNC: 1.27 MIU/L (ref 0.4–4.5)
WBC # BLD AUTO: 12.1 THOUSAND/UL (ref 3.8–10.8)

## 2020-12-20 ENCOUNTER — HOSPITAL ENCOUNTER (OUTPATIENT)
Dept: MAMMOGRAPHY | Facility: IMAGING CENTER | Age: 55
Discharge: HOME | End: 2020-12-20
Payer: COMMERCIAL

## 2020-12-20 VITALS — HEIGHT: 64 IN | BODY MASS INDEX: 30.22 KG/M2 | WEIGHT: 177 LBS

## 2020-12-20 DIAGNOSIS — Z12.31 ENCOUNTER FOR SCREENING MAMMOGRAM FOR BREAST CANCER: ICD-10-CM

## 2020-12-20 DIAGNOSIS — Z12.31 VISIT FOR SCREENING MAMMOGRAM: ICD-10-CM

## 2020-12-20 PROCEDURE — 77067 SCR MAMMO BI INCL CAD: CPT

## 2020-12-20 PROCEDURE — 77063 BREAST TOMOSYNTHESIS BI: CPT

## 2021-04-08 DIAGNOSIS — Z23 ENCOUNTER FOR IMMUNIZATION: ICD-10-CM

## 2021-07-30 ENCOUNTER — OFFICE VISIT (OUTPATIENT)
Dept: INTERNAL MEDICINE CLINIC | Facility: CLINIC | Age: 56
End: 2021-07-30
Payer: COMMERCIAL

## 2021-07-30 ENCOUNTER — TELEPHONE (OUTPATIENT)
Dept: INTERNAL MEDICINE CLINIC | Facility: CLINIC | Age: 56
End: 2021-07-30

## 2021-07-30 VITALS
DIASTOLIC BLOOD PRESSURE: 90 MMHG | HEIGHT: 64 IN | TEMPERATURE: 97.5 F | SYSTOLIC BLOOD PRESSURE: 119 MMHG | OXYGEN SATURATION: 99 % | WEIGHT: 165 LBS | BODY MASS INDEX: 28.17 KG/M2 | HEART RATE: 105 BPM

## 2021-07-30 DIAGNOSIS — E08.65 DIABETES MELLITUS DUE TO UNDERLYING CONDITION WITH HYPERGLYCEMIA, WITHOUT LONG-TERM CURRENT USE OF INSULIN (HCC): ICD-10-CM

## 2021-07-30 DIAGNOSIS — N20.0 KIDNEY STONE ON RIGHT SIDE: Primary | ICD-10-CM

## 2021-07-30 LAB
BACTERIA UR QL AUTO: ABNORMAL /HPF
BILIRUB UR QL STRIP: NEGATIVE
CLARITY UR: ABNORMAL
COLOR UR: YELLOW
CREAT UR-MCNC: 36 MG/DL
GLUCOSE UR STRIP-MCNC: ABNORMAL MG/DL
HGB UR QL STRIP.AUTO: ABNORMAL
HYALINE CASTS #/AREA URNS LPF: ABNORMAL /LPF
KETONES UR STRIP-MCNC: NEGATIVE MG/DL
LEUKOCYTE ESTERASE UR QL STRIP: ABNORMAL
MICROALBUMIN UR-MCNC: 362 MG/L (ref 0–20)
MICROALBUMIN/CREAT 24H UR: 1006 MG/G CREATININE (ref 0–30)
NITRITE UR QL STRIP: NEGATIVE
NON-SQ EPI CELLS URNS QL MICRO: ABNORMAL /HPF
PH UR STRIP.AUTO: 6 [PH]
PROT UR STRIP-MCNC: ABNORMAL MG/DL
RBC #/AREA URNS AUTO: ABNORMAL /HPF
SL AMB  POCT GLUCOSE, UA: ABNORMAL
SL AMB LEUKOCYTE ESTERASE,UA: ABNORMAL
SL AMB POCT BILIRUBIN,UA: ABNORMAL
SL AMB POCT BLOOD,UA: ABNORMAL
SL AMB POCT CLARITY,UA: ABNORMAL
SL AMB POCT COLOR,UA: YELLOW
SL AMB POCT KETONES,UA: ABNORMAL
SL AMB POCT NITRITE,UA: ABNORMAL
SL AMB POCT PH,UA: 5.5
SL AMB POCT SPECIFIC GRAVITY,UA: 1.02
SL AMB POCT URINE PROTEIN: ABNORMAL
SL AMB POCT UROBILINOGEN: 3.5
SP GR UR STRIP.AUTO: 1.03 (ref 1–1.03)
UROBILINOGEN UR QL STRIP.AUTO: 0.2 E.U./DL
WBC #/AREA URNS AUTO: ABNORMAL /HPF

## 2021-07-30 PROCEDURE — 87077 CULTURE AEROBIC IDENTIFY: CPT | Performed by: INTERNAL MEDICINE

## 2021-07-30 PROCEDURE — 81001 URINALYSIS AUTO W/SCOPE: CPT | Performed by: INTERNAL MEDICINE

## 2021-07-30 PROCEDURE — 87186 SC STD MICRODIL/AGAR DIL: CPT | Performed by: INTERNAL MEDICINE

## 2021-07-30 PROCEDURE — 99214 OFFICE O/P EST MOD 30 MIN: CPT | Performed by: INTERNAL MEDICINE

## 2021-07-30 PROCEDURE — 82570 ASSAY OF URINE CREATININE: CPT | Performed by: INTERNAL MEDICINE

## 2021-07-30 PROCEDURE — 3062F POS MACROALBUMINURIA REV: CPT | Performed by: INTERNAL MEDICINE

## 2021-07-30 PROCEDURE — 87086 URINE CULTURE/COLONY COUNT: CPT | Performed by: INTERNAL MEDICINE

## 2021-07-30 PROCEDURE — 81002 URINALYSIS NONAUTO W/O SCOPE: CPT | Performed by: INTERNAL MEDICINE

## 2021-07-30 PROCEDURE — 82043 UR ALBUMIN QUANTITATIVE: CPT | Performed by: INTERNAL MEDICINE

## 2021-07-30 RX ORDER — CEPHALEXIN 500 MG/1
500 CAPSULE ORAL EVERY 12 HOURS SCHEDULED
Qty: 14 CAPSULE | Refills: 0 | Status: SHIPPED | OUTPATIENT
Start: 2021-07-30 | End: 2021-08-06

## 2021-07-30 RX ORDER — OXYCODONE HYDROCHLORIDE 5 MG/1
5 TABLET ORAL EVERY 6 HOURS PRN
Qty: 30 TABLET | Refills: 0 | Status: SHIPPED | OUTPATIENT
Start: 2021-07-30 | End: 2021-09-30 | Stop reason: ALTCHOICE

## 2021-07-30 NOTE — TELEPHONE ENCOUNTER
Pt called to ask if you were going to send a prescription for an antibiotic incase she turns out to have UTI

## 2021-07-30 NOTE — PROGRESS NOTES
Assessment/Plan:  Highly suspect right kidney stone since she has a h/o stones , right flank pain and microscopic hematuria  Her sugar is likely uncontrolled since she has glucosuria  Labs to be obtained to check this  Start Keflex  CT scheduled for Monday (she cannot go today bec of transportation issues)  Oxycodone Rx provided to take PRN  Stay well hydrated and if symptoms worsen, she must go to the ER     Problem List Items Addressed This Visit        Endocrine    Diabetes mellitus due to underlying condition with hyperglycemia, without long-term current use of insulin ([facility] [address] )    Relevant Orders    POCT urine dip (Completed)    UA w Reflex to Microscopic w Reflex to Culture - Clinic Collect    CBC and differential    Comprehensive metabolic panel    HEMOGLOBIN A1C W/ EAG ESTIMATION    Lipid Panel with Direct LDL reflex    Microalbumin / creatinine urine ratio      Other Visit Diagnoses     Kidney stone on right side    -  Primary    Relevant Medications    oxyCODONE (ROXICODONE) 5 mg immediate release tablet    cephalexin (KEFLEX) 500 mg capsule    Other Relevant Orders    POCT urine dip (Completed)    UA w Reflex to Microscopic w Reflex to Culture - Clinic Collect    CT renal stone study abdomen pelvis wo contrast            Subjective:      Patient ID: Katalina Brannon is a 60 y o  female  HPI  Started with right flank pain a week ago  It is a constant 6/10 pain  She took old prescription pain meds she had at home  No fever, chills, nausea, vomiting  Denies urinary frequency, urgency, dysuria, hematuria  She has known stones in her left kidney  Last KUB Xray, US in 2016    The following portions of the patient's history were reviewed and updated as appropriate: allergies, current medications, past family history, past medical history, past surgical history and problem list     Review of Systems   Constitutional: Negative for chills and fever  Respiratory: Negative for shortness of breath      Cardiovascular: Negative for chest pain and palpitations  Gastrointestinal: Negative for abdominal pain, constipation, diarrhea, nausea and vomiting  Genitourinary: Positive for flank pain  Negative for difficulty urinating, dysuria and hematuria  Objective:      /90   Pulse 105   Temp 97 5 °F (36 4 °C) (Temporal)   Ht 5' 4" (1 626 m)   Wt 74 8 kg (165 lb)   SpO2 99%   BMI 28 32 kg/m²          Physical Exam  Constitutional:       General: She is not in acute distress  Appearance: She is not ill-appearing, toxic-appearing or diaphoretic  Cardiovascular:      Rate and Rhythm: Normal rate and regular rhythm  Heart sounds: Normal heart sounds  No murmur heard  Pulmonary:      Effort: No respiratory distress  Breath sounds: Normal breath sounds  Abdominal:      General: There is no distension  Palpations: Abdomen is soft  There is no mass  Tenderness: There is no abdominal tenderness  There is right CVA tenderness  There is no left CVA tenderness, guarding or rebound  Musculoskeletal:      Cervical back: Neck supple  Right lower leg: No edema  Left lower leg: No edema  Neurological:      Mental Status: She is oriented to person, place, and time     Psychiatric:         Mood and Affect: Mood normal          Behavior: Behavior normal

## 2021-08-01 LAB — BACTERIA UR CULT: ABNORMAL

## 2021-08-03 ENCOUNTER — HOSPITAL ENCOUNTER (OUTPATIENT)
Dept: CT IMAGING | Facility: HOSPITAL | Age: 56
Discharge: HOME | End: 2021-08-03
Payer: COMMERCIAL

## 2021-08-03 DIAGNOSIS — N20.0 KIDNEY STONE ON RIGHT SIDE: ICD-10-CM

## 2021-08-03 PROCEDURE — 74176 CT ABD & PELVIS W/O CONTRAST: CPT

## 2021-08-03 PROCEDURE — G1004 CDSM NDSC: HCPCS

## 2021-08-04 ENCOUNTER — APPOINTMENT (OUTPATIENT)
Dept: LAB | Facility: CLINIC | Age: 56
End: 2021-08-04
Payer: COMMERCIAL

## 2021-08-04 LAB
ALBUMIN SERPL BCP-MCNC: 3.6 G/DL (ref 3.5–5)
ALP SERPL-CCNC: 139 U/L (ref 46–116)
ALT SERPL W P-5'-P-CCNC: 20 U/L (ref 12–78)
ANION GAP SERPL CALCULATED.3IONS-SCNC: 11 MMOL/L (ref 4–13)
AST SERPL W P-5'-P-CCNC: 14 U/L (ref 5–45)
BASOPHILS # BLD AUTO: 0.04 THOUSANDS/ΜL (ref 0–0.1)
BASOPHILS NFR BLD AUTO: 1 % (ref 0–1)
BILIRUB SERPL-MCNC: 0.17 MG/DL (ref 0.2–1)
BUN SERPL-MCNC: 19 MG/DL (ref 5–25)
CALCIUM SERPL-MCNC: 9.3 MG/DL (ref 8.3–10.1)
CHLORIDE SERPL-SCNC: 99 MMOL/L (ref 100–108)
CHOLEST SERPL-MCNC: 206 MG/DL (ref 50–200)
CO2 SERPL-SCNC: 26 MMOL/L (ref 21–32)
CREAT SERPL-MCNC: 0.88 MG/DL (ref 0.6–1.3)
EOSINOPHIL # BLD AUTO: 0.17 THOUSAND/ΜL (ref 0–0.61)
EOSINOPHIL NFR BLD AUTO: 2 % (ref 0–6)
ERYTHROCYTE [DISTWIDTH] IN BLOOD BY AUTOMATED COUNT: 13.8 % (ref 11.6–15.1)
EST. AVERAGE GLUCOSE BLD GHB EST-MCNC: 332 MG/DL
GFR SERPL CREATININE-BSD FRML MDRD: 73 ML/MIN/1.73SQ M
GLUCOSE P FAST SERPL-MCNC: 281 MG/DL (ref 65–99)
HBA1C MFR BLD: 13.2 %
HCT VFR BLD AUTO: 33.8 % (ref 34.8–46.1)
HDLC SERPL-MCNC: 42 MG/DL
HGB BLD-MCNC: 10.4 G/DL (ref 11.5–15.4)
IMM GRANULOCYTES # BLD AUTO: 0.04 THOUSAND/UL (ref 0–0.2)
IMM GRANULOCYTES NFR BLD AUTO: 1 % (ref 0–2)
LDLC SERPL DIRECT ASSAY-MCNC: 92 MG/DL (ref 0–100)
LYMPHOCYTES # BLD AUTO: 4.31 THOUSANDS/ΜL (ref 0.6–4.47)
LYMPHOCYTES NFR BLD AUTO: 48 % (ref 14–44)
MCH RBC QN AUTO: 25.9 PG (ref 26.8–34.3)
MCHC RBC AUTO-ENTMCNC: 30.8 G/DL (ref 31.4–37.4)
MCV RBC AUTO: 84 FL (ref 82–98)
MONOCYTES # BLD AUTO: 0.49 THOUSAND/ΜL (ref 0.17–1.22)
MONOCYTES NFR BLD AUTO: 6 % (ref 4–12)
NEUTROPHILS # BLD AUTO: 3.7 THOUSANDS/ΜL (ref 1.85–7.62)
NEUTS SEG NFR BLD AUTO: 42 % (ref 43–75)
NRBC BLD AUTO-RTO: 0 /100 WBCS
PLATELET # BLD AUTO: 395 THOUSANDS/UL (ref 149–390)
PMV BLD AUTO: 9.5 FL (ref 8.9–12.7)
POTASSIUM SERPL-SCNC: 4.3 MMOL/L (ref 3.5–5.3)
PROT SERPL-MCNC: 7.5 G/DL (ref 6.4–8.2)
RBC # BLD AUTO: 4.02 MILLION/UL (ref 3.81–5.12)
SODIUM SERPL-SCNC: 136 MMOL/L (ref 136–145)
TRIGL SERPL-MCNC: 460 MG/DL
WBC # BLD AUTO: 8.75 THOUSAND/UL (ref 4.31–10.16)

## 2021-08-04 PROCEDURE — 83036 HEMOGLOBIN GLYCOSYLATED A1C: CPT | Performed by: INTERNAL MEDICINE

## 2021-08-04 PROCEDURE — 80053 COMPREHEN METABOLIC PANEL: CPT | Performed by: INTERNAL MEDICINE

## 2021-08-04 PROCEDURE — 83721 ASSAY OF BLOOD LIPOPROTEIN: CPT | Performed by: INTERNAL MEDICINE

## 2021-08-04 PROCEDURE — 36415 COLL VENOUS BLD VENIPUNCTURE: CPT | Performed by: INTERNAL MEDICINE

## 2021-08-04 PROCEDURE — 80061 LIPID PANEL: CPT | Performed by: INTERNAL MEDICINE

## 2021-08-04 PROCEDURE — 3046F HEMOGLOBIN A1C LEVEL >9.0%: CPT | Performed by: INTERNAL MEDICINE

## 2021-08-04 PROCEDURE — 85025 COMPLETE CBC W/AUTO DIFF WBC: CPT | Performed by: INTERNAL MEDICINE

## 2021-08-05 ENCOUNTER — TELEPHONE (OUTPATIENT)
Dept: INTERNAL MEDICINE CLINIC | Facility: CLINIC | Age: 56
End: 2021-08-05

## 2021-08-05 ENCOUNTER — OFFICE VISIT (OUTPATIENT)
Dept: INTERNAL MEDICINE CLINIC | Facility: CLINIC | Age: 56
End: 2021-08-05
Payer: COMMERCIAL

## 2021-08-05 VITALS
BODY MASS INDEX: 28.68 KG/M2 | DIASTOLIC BLOOD PRESSURE: 90 MMHG | HEART RATE: 102 BPM | SYSTOLIC BLOOD PRESSURE: 145 MMHG | OXYGEN SATURATION: 100 % | TEMPERATURE: 97.6 F | HEIGHT: 64 IN | WEIGHT: 168 LBS

## 2021-08-05 DIAGNOSIS — N20.0 KIDNEY STONE: ICD-10-CM

## 2021-08-05 DIAGNOSIS — D64.9 ANEMIA, UNSPECIFIED TYPE: ICD-10-CM

## 2021-08-05 DIAGNOSIS — E11.65 UNCONTROLLED TYPE 2 DIABETES MELLITUS WITH HYPERGLYCEMIA (HCC): Primary | ICD-10-CM

## 2021-08-05 DIAGNOSIS — I10 BENIGN ESSENTIAL HYPERTENSION: ICD-10-CM

## 2021-08-05 DIAGNOSIS — J30.9 ALLERGIC RHINITIS, UNSPECIFIED SEASONALITY, UNSPECIFIED TRIGGER: ICD-10-CM

## 2021-08-05 DIAGNOSIS — E78.2 MIXED HYPERLIPIDEMIA: ICD-10-CM

## 2021-08-05 DIAGNOSIS — J45.30 MILD PERSISTENT ASTHMA, UNSPECIFIED WHETHER COMPLICATED: ICD-10-CM

## 2021-08-05 PROBLEM — E11.9 TYPE 2 DIABETES MELLITUS WITHOUT COMPLICATION, WITHOUT LONG-TERM CURRENT USE OF INSULIN (HCC): Status: RESOLVED | Noted: 2020-09-11 | Resolved: 2021-08-05

## 2021-08-05 PROBLEM — E08.65 DIABETES MELLITUS DUE TO UNDERLYING CONDITION WITH HYPERGLYCEMIA, WITHOUT LONG-TERM CURRENT USE OF INSULIN (HCC): Status: RESOLVED | Noted: 2020-09-11 | Resolved: 2021-08-05

## 2021-08-05 PROCEDURE — 3008F BODY MASS INDEX DOCD: CPT | Performed by: INTERNAL MEDICINE

## 2021-08-05 PROCEDURE — 99215 OFFICE O/P EST HI 40 MIN: CPT | Performed by: INTERNAL MEDICINE

## 2021-08-05 PROCEDURE — 1036F TOBACCO NON-USER: CPT | Performed by: INTERNAL MEDICINE

## 2021-08-05 PROCEDURE — 4010F ACE/ARB THERAPY RXD/TAKEN: CPT | Performed by: INTERNAL MEDICINE

## 2021-08-05 RX ORDER — ATORVASTATIN CALCIUM 10 MG/1
10 TABLET, FILM COATED ORAL
Qty: 90 TABLET | Refills: 3 | Status: SHIPPED | OUTPATIENT
Start: 2021-08-05 | End: 2021-11-11 | Stop reason: SDUPTHER

## 2021-08-05 RX ORDER — INSULIN GLARGINE 100 [IU]/ML
10 INJECTION, SOLUTION SUBCUTANEOUS
Qty: 3 ML | Refills: 2 | Status: SHIPPED | OUTPATIENT
Start: 2021-08-05 | End: 2021-08-05 | Stop reason: ALTCHOICE

## 2021-08-05 RX ORDER — PEN NEEDLE, DIABETIC 30 GX3/16"
NEEDLE, DISPOSABLE MISCELLANEOUS
Qty: 100 EACH | Refills: 3 | Status: SHIPPED | OUTPATIENT
Start: 2021-08-05

## 2021-08-05 RX ORDER — LANCETS 33 GAUGE
EACH MISCELLANEOUS
Qty: 300 EACH | Refills: 3 | Status: SHIPPED | OUTPATIENT
Start: 2021-08-05

## 2021-08-05 RX ORDER — ALBUTEROL SULFATE 90 UG/1
1-2 AEROSOL, METERED RESPIRATORY (INHALATION) EVERY 4 HOURS PRN
Qty: 18 G | Refills: 3 | Status: SHIPPED | OUTPATIENT
Start: 2021-08-05 | End: 2022-06-09 | Stop reason: SDUPTHER

## 2021-08-05 RX ORDER — INSULIN GLARGINE 100 [IU]/ML
10 INJECTION, SOLUTION SUBCUTANEOUS
Qty: 3 ML | Refills: 0 | Status: SHIPPED | COMMUNITY
Start: 2021-08-05 | End: 2021-09-30 | Stop reason: ALTCHOICE

## 2021-08-05 RX ORDER — METFORMIN HYDROCHLORIDE 500 MG/1
1000 TABLET, EXTENDED RELEASE ORAL 2 TIMES DAILY WITH MEALS
Qty: 360 TABLET | Refills: 3 | Status: SHIPPED | OUTPATIENT
Start: 2021-08-05 | End: 2022-06-09 | Stop reason: SDUPTHER

## 2021-08-05 RX ORDER — LISINOPRIL 5 MG/1
5 TABLET ORAL DAILY
Qty: 90 TABLET | Refills: 3 | Status: SHIPPED | OUTPATIENT
Start: 2021-08-05 | End: 2022-06-09 | Stop reason: SDUPTHER

## 2021-08-05 RX ORDER — MONTELUKAST SODIUM 10 MG/1
10 TABLET ORAL EVERY EVENING
Qty: 90 TABLET | Refills: 3 | Status: SHIPPED | OUTPATIENT
Start: 2021-08-05 | End: 2022-06-09 | Stop reason: SDUPTHER

## 2021-08-05 RX ORDER — BLOOD SUGAR DIAGNOSTIC
1 STRIP MISCELLANEOUS 3 TIMES DAILY
Qty: 300 EACH | Refills: 3 | Status: SHIPPED | OUTPATIENT
Start: 2021-08-05 | End: 2022-06-09 | Stop reason: SDUPTHER

## 2021-08-05 NOTE — ASSESSMENT & PLAN NOTE
She agrees to starting insulin at this point  Start Basaglar 10units at bedtime  See diabetes educator  She is already on an ACE inh and statin    Lab Results   Component Value Date    HGBA1C 13 2 (H) 08/04/2021

## 2021-08-05 NOTE — TELEPHONE ENCOUNTER
Received notification from [facility] that the [employer] needs prior auth and also the [employer]  Please see the notice in patients chart with alternatives and advise

## 2021-08-05 NOTE — ASSESSMENT & PLAN NOTE
Right flank pain is better  CT scan still pending  Her urine culture did from [place] susceptible to cephalexin so she should finish this course

## 2021-08-05 NOTE — PROGRESS NOTES
Assessment/Plan:    Uncontrolled type 2 diabetes mellitus with hyperglycemia ([facility] )  She agrees to starting insulin at this point  Start Basaglar 10units at bedtime  See diabetes educator  She is already on an ACE inh and statin    Lab Results   Component Value Date    HGBA1C 13 2 (H) 08/04/2021       Kidney stone  Right flank pain is better  CT scan still pending  Her urine culture did from [address] susceptible to cephalexin so she should finish this course    Anemia  Suspect iron def  Colonoscopy in 2019 with polyp  Will need GI work up again    I have spent >45 minutes with Patient  today in which greater than 50% of this time was spent in counseling/coordination of care regarding Diagnostic results, Prognosis, Risks and benefits of tx options and Intructions for management           Problem List Items Addressed This Visit        Endocrine    Uncontrolled type 2 diabetes mellitus with hyperglycemia ([facility] ) - Primary     She agrees to starting insulin at this point  Start Basaglar 10units at bedtime  See diabetes educator  She is already on an ACE inh and statin    Lab Results   Component Value Date    HGBA1C 13 2 (H) 08/04/2021            Relevant Medications    glucose blood (OneTouch Ultra) test strip    Lancets (OneTouch Delica Plus [id number]) MISC    metFORMIN (GLUCOPHAGE-XR) 500 mg 24 hr tablet    insulin glargine (Basaglar KwikPen) 100 units/mL injection pen    insulin glargine (Basaglar KwikPen) 100 units/mL injection pen    Insulin Pen Needle (Pen Needles) 32G X 4 MM MISC    Other Relevant Orders    Ambulatory Referral to Ophthalmology    Ambulatory referral to Diabetic Education    Ambulatory referral to clinical pharmacy       Respiratory    Mild persistent asthma    Relevant Medications    beclomethasone (Qvar) 40 MCG/ACT inhaler    montelukast (SINGULAIR) 10 mg tablet    albuterol (ProAir HFA) 90 mcg/act inhaler    Allergic rhinitis    Relevant Medications    montelukast (SINGULAIR) 10 mg tablet Cardiovascular and Mediastinum    Benign essential hypertension    Relevant Medications    lisinopril (ZESTRIL) 5 mg tablet       Genitourinary    Kidney stone     Right flank pain is better  CT scan still pending  Her urine culture did from [address] susceptible to cephalexin so she should finish this course            Other    Hyperlipidemia    Relevant Medications    atorvastatin (LIPITOR) 10 mg tablet    Anemia     Suspect iron def  Colonoscopy in 2019 with polyp  Will need GI work up again                 Subjective:      Patient ID: Oly Nguyen is a 60 y o  female  HPI  Patient instructed to come in to discuss labs  A1C 13 2   Hgb 10 4  CT r/o kidney stone pending  She is on cephalexin for UTI  Right flank pain is better on Advil PRN  She is on metformin 2g a day  She has been checking her sugar a few days a week and it has been >300    The following portions of the patient's history were reviewed and updated as appropriate: allergies, current medications, past family history, past medical history, past social history, past surgical history and problem list     Review of Systems   Constitutional: Negative for chills and fever  Respiratory: Negative for cough and shortness of breath  Cardiovascular: Negative for chest pain and palpitations  Gastrointestinal: Negative for abdominal pain, blood in stool, constipation, diarrhea, nausea and vomiting  Genitourinary: Positive for flank pain (better)  Objective:      /90   Pulse 102   Temp 97 6 °F (36 4 °C) (Temporal)   Ht 5' 4" (1 626 m)   Wt 76 2 kg (168 lb)   SpO2 100%   BMI 28 84 kg/m²          Physical Exam  Constitutional:       General: She is not in acute distress  Appearance: She is well-developed  She is obese  She is not ill-appearing, toxic-appearing or diaphoretic  HENT:      Head: Normocephalic and atraumatic     Eyes:      Conjunctiva/sclera: Conjunctivae normal    Cardiovascular:      Rate and Rhythm: Normal rate and regular rhythm  Heart sounds: Normal heart sounds  No murmur heard  Pulmonary:      Effort: Pulmonary effort is normal  No respiratory distress  Breath sounds: Normal breath sounds  No wheezing or rales  Abdominal:      General: There is no distension  Palpations: Abdomen is soft  There is no mass  Tenderness: There is no abdominal tenderness  There is no right CVA tenderness, left CVA tenderness, guarding or rebound  Musculoskeletal:      Cervical back: Neck supple  Right lower leg: No edema  Left lower leg: No edema  Skin:     General: Skin is warm and dry  Neurological:      Mental Status: She is alert and oriented to person, place, and time  Psychiatric:         Mood and Affect: Mood normal          Behavior: Behavior normal          Thought Content:  Thought content normal          Judgment: Judgment normal

## 2021-08-06 NOTE — TELEPHONE ENCOUNTER
Please inform her that Lantus is what is covered for her  She can use up the [address] and when she runs out, Lantus is what she will get from the pharmacy  Dosing the same  Flovent is what is covered so she can switch to this from [employer]

## 2021-08-11 ENCOUNTER — TELEPHONE (OUTPATIENT)
Dept: UROLOGY | Facility: AMBULATORY SURGERY CENTER | Age: 56
End: 2021-08-11

## 2021-08-11 NOTE — TELEPHONE ENCOUNTER
Reason for appointment/Complaint/Diagnosis : Staghorn Calculus patient requesting to see Female PA in   [facility] [address]: [address]  If yes, what kind?n/a  Previous urologist?Diane           Records requested/where? Epic    Outside testing/where? No    Location Preference for office visit?  [place]

## 2021-08-12 NOTE — TELEPHONE ENCOUNTER
CT done vis PCP on 8/3/21 for kidney stone:  IMPRESSION:     1   3 5 cm left staghorn calculus with hydronephrosis  2   Cluster of multiple subcentimeter calculi in a left lower pole calyx  3   3 cm fluid containing structure in the upper pole the left kidney, probably a dilated calyx or, possibly, a renal sinus cyst   4   Stranding in the left renal pelvic and periureteric fat,, consistent with pyelitis and ureteritis  Intrarenal infection, including abscess, cannot be excluded without intravenous contrast   IV contrast would also be helpful to determine the   functional status of the left kidney  This determination would be especially helpful if there is concern regarding xanthogranulomatous pyelonephritis  5   Several mildly prominent left para-aortic lymph nodes at and inferior to the left renal hilum, likely reactive, in response to left renal inflammation  Patient previously seen by Dr Adriana Carranza in 2015  Please advise if she needs to see AP or MD, results notes state need to see urology ASAP

## 2021-08-12 NOTE — TELEPHONE ENCOUNTER
I spoke to pt and she is going on vacation and needs apt after 9/8  I let her know as soon as Dr Leia Barrientos schedule is open we will call her to schedule  Please schedule pt as soon as Dr Stacey James is open

## 2021-08-16 ENCOUNTER — CLINICAL SUPPORT (OUTPATIENT)
Dept: INTERNAL MEDICINE CLINIC | Facility: CLINIC | Age: 56
End: 2021-08-16

## 2021-08-16 DIAGNOSIS — E11.65 UNCONTROLLED TYPE 2 DIABETES MELLITUS WITH HYPERGLYCEMIA (HCC): ICD-10-CM

## 2021-08-16 RX ORDER — INSULIN GLARGINE 100 [IU]/ML
INJECTION, SOLUTION SUBCUTANEOUS
Qty: 30 ML | Refills: 1 | Status: SHIPPED | OUTPATIENT
Start: 2021-08-16 | End: 2021-09-30

## 2021-08-16 NOTE — TELEPHONE ENCOUNTER
Per pharmacist encounter on 08/16/21, pending orders for signature/concurrence from [employer]     Lantus

## 2021-08-16 NOTE — PROGRESS NOTES
[place], Pharmacist    Reason for visit: Appointment with 60y o  year old for management of T2DM  Current DM Regimen:   Metformin    Lantus    ASSESSMENT/PLAN                                                                                     1  Type 2 diabetes: goal A1c <6 5% based on AACE/ACE guidelines  Most recent A1c above goal but not reflective of current treatment as patient was not on insulin  SMBG readings above goal but no log to confirm; no hypoglycemia  Uncertain sleep s/sx r/t Basaglar  Would benefit from SGLT2i however hesitant to rec at this time given A1c and limited A1c lowering potential     ADA guidelines recommend 2 of 3 specimens of UACR collected within 3-6 month period should be abnormal before considering a patient to have albuminuria  BMP shows hyperglycemia with possible dilutional hypochloremia; patient was fasting  Duration: < 10 years (dx 2013)  Microvascular complications: microalbuminuria  Macrovascular complications: none  (Yes ) Aspirin (Yes ) Statin (Yes ) ACEI/ARB  Eye Exam:    Lab Results   Component Value Date    LEFTDIABRET None 01/09/2019    RIGHTDIABRET None 01/09/2019   Foot Exam: 9/2020       Most recent labs and diabetes goals discussed with patient in clinic today   MEDICATIONS: If PCP is in agreeance, increase Basaglar to 13 units; continue metformin  Start Lantus when Yessica Livia is used up  o Will pend rx for covering PCP signature/concurrence   SMBG: TID - FBG and pre/post-supper  BG log provided   o Patient would like to continue with current glucometer for now   TLCs: Re-enforced the importance of a Diabetic Diet, exercise 30 minutes 5 days a week as tolerated, weight loss/control; discussion on benefits of plant based diet  Patient will start recording food   Patient will meet with dietician    2  Medication Reconciliation: Medication list reviewed with pt at today's visit  Discrepancies as discussed below   Medication list updated to reflect medications pt is currently taking    Follow-Up: 2 weeks  _x_ Home Monitoring Records, BG      SUBJECTIVE                                                                                                              1  Medication Adherence: Medication list reviewed with patient, reports the following discrepancies/problems:   Basaglar: using up sample pen she received from PCP, taking 10 units/day   Lantus: has not picked up   MetFORMIN: taking as prescribed    Misses doses:  no      2  Medication Efficacy:    Review of Systems   Gastrointestinal:        Has noticed slight increase in bloating/weight gain   Psychiatric/Behavioral: Positive for sleep disturbance (has had more difficulty falling asleep and waking up more at night, denies concern with BG impacting sleep)  SMBG readings (no log, per memory): checking BID  FB this morning, usually > 200  HS B last night    SMBG:   Glucometer: Yes, Brand: One Touch Ultra Mini    Hypoglycemia history: 0 SMBG readings < 70mg/dL since last appt   Unsymptomatic hypoglycemia: No, reports hypoglycemia s/sx x0 since last appt    3  Lifestyle: using SF sweetener, tries to eat 5 smaller meals  Cooks food at home  Cares for MIL and reports stress r/t to this  Previously tracked foods in [employer] but stopped a few months ago, felt this helped her  Tries to limit CHO intake but would like to learn more how she can improve glycemic control with lifestyle     Last visit with dietician: referral pending       Diet Recall      B - Food: oatmeal with almond milk or eggs     Snack - greek yogurt ([employer]) or protein shake     L - Food: soup or sandwiches with low-CHO bread      Snack - greek yogurt or string cheese     D - Food: turkey chili with cheese last night      Daily Beverages: water     Exercise: walks 1 mile twice daily      Social History     Tobacco Use   Smoking Status Never Smoker Smokeless Tobacco Never Used     Social History     Substance and Sexual Activity   Alcohol Use Yes    Comment: social          OBJECTIVE                                                                                                      Pertinent Lab Data:   Patient was fasting for most recent labs  Lab Results   Component Value Date    SODIUM 136 08/04/2021    K 4 3 08/04/2021    EGFR 73 08/04/2021    CREATININE 0 88 08/04/2021    GLUF 281 (H) 08/04/2021    MICROALBCRE 1,006 (H) 07/30/2021       Lab Results   Component Value Date    HGBA1C 13 2 (H) 08/04/2021    HGBA1C 9 5 (A) 09/11/2020    HGBA1C 7 1 (H) 08/10/2018     Demographics  Interaction Method: Face to Face  Type of Intervention: New    Topic(s) Addressed  Diabetes    Intervention(s) Made    Pharmacologic:      Medication Adjustment - Dose or Frequency: Basaglar    Non-Pharmacologic:      Other: Lifestyle, SMBG    Tool(s) Used  Not Applicable    Time Spent:   Time Spent in Direct Patient Care: 50 minutes    Time Spent in Care Coordination: 20 minutes    Recommendation(s) Accepted by the Patient/Caregiver:  All Accepted

## 2021-09-21 ENCOUNTER — OFFICE VISIT (OUTPATIENT)
Dept: UROLOGY | Facility: CLINIC | Age: 56
End: 2021-09-21
Payer: COMMERCIAL

## 2021-09-21 VITALS
BODY MASS INDEX: 29.71 KG/M2 | DIASTOLIC BLOOD PRESSURE: 84 MMHG | SYSTOLIC BLOOD PRESSURE: 132 MMHG | HEIGHT: 64 IN | WEIGHT: 174 LBS

## 2021-09-21 DIAGNOSIS — N20.0 STAGHORN CALCULUS: ICD-10-CM

## 2021-09-21 DIAGNOSIS — E11.65 UNCONTROLLED TYPE 2 DIABETES MELLITUS WITH HYPERGLYCEMIA (HCC): ICD-10-CM

## 2021-09-21 DIAGNOSIS — N20.0 KIDNEY STONE: Primary | ICD-10-CM

## 2021-09-21 DIAGNOSIS — N20.0 STAGHORN RENAL CALCULUS: ICD-10-CM

## 2021-09-21 DIAGNOSIS — R33.9 INCOMPLETE EMPTYING OF BLADDER: ICD-10-CM

## 2021-09-21 LAB — POST-VOID RESIDUAL VOLUME, ML POC: 17 ML

## 2021-09-21 PROCEDURE — 51798 US URINE CAPACITY MEASURE: CPT | Performed by: UROLOGY

## 2021-09-21 PROCEDURE — 99204 OFFICE O/P NEW MOD 45 MIN: CPT | Performed by: UROLOGY

## 2021-09-21 PROCEDURE — 3008F BODY MASS INDEX DOCD: CPT | Performed by: UROLOGY

## 2021-09-21 PROCEDURE — 1036F TOBACCO NON-USER: CPT | Performed by: UROLOGY

## 2021-09-21 NOTE — PROGRESS NOTES
Assessment/Plan:    Staghorn renal calculus  The patient has a large staghorn calculus of the left kidney  The radiology read was concerning for possible XGP kidney based on findings  My concern for this is overall low based on her history and non con imaging but plan for CT IVP to better define the collecting system including possibly obstructed left upper pole calyx  Assuming this is not as concerning for XGP void recommend PCNL for patient  I do think her diabetes should get under better control before surgery to reduce the risk for urine tract infection and wound healing  I will see the patient back in 2 months after CT IVP and giving her time to get her A1c under better control which point we will rediscuss the role for PCNL which was talked about today  Incomplete emptying of bladder  Patient is emptying well based on postvoid residual of 17 cc today  And is possible she has some difficulty voiding related to history of hysterectomy and or diabetic related changes to the bladder          Subjective:      Patient ID: Romeo Ma is a 60 y o  female  HPI  57-year-old female with poorly controlled diabetes found to have a large left staghorn calculus and with recent UTI associated with right flank pain  The patient had RIGHT-sided flank pain and a  E coli urine tract infection treated with Keflex  A CT non con was done showing LEFT staghorn occupying interpolar and lower poles with upper pole cystic lesion possibly representing dilated calyx  The radiology read was also slightly concerning for possible XGP kidney based on findings of stranding and perirenal lymphadenopathy  Patient denies any pain on the left side  She denies any history of recurrent infections or pyelonephritis on the left side  She has a history of recurrent nephrolithiasis and has had multiple lithotripsies surgeries in the past all before 2016    There were apparently done at [facility] by Dr Shawanda Araujo but I cannot see operative notes  Patient reports some difficulty voiding in the mornings  However over the she feels like she is emptying better  PVR 17 cc  She has a history of hysterectomy 20 years ago for severe endometriosis associated with bladder involvement/ injury at the time that required urological repair  Patient's last A1c was 13%  Past Surgical History:   Procedure Laterality Date     SECTION      HYSTERECTOMY      age 37 per MRS    OOPHORECTOMY Bilateral     age 37 per MRS, with hysterectomy    TOTAL ABDOMINAL HYSTERECTOMY W/ BILATERAL SALPINGOOPHORECTOMY          Past Medical History:   Diagnosis Date    Abnormal LFTs (liver function tests)     last assessed 14    Anemia     last assessed 14    Diabetes mellitus due to underlying condition with hyperglycemia, without long-term current use of insulin ([facility] ) 2020    Elevated blood pressure reading     last assessed 13    Hematuria     last assessed 14    Hypercalcemia     last assessed 14    Leukocytosis     last assessed     Microalbuminuria     last assessed 14    Type 2 diabetes mellitus with hyperglycemia ([facility] [address] ) 2013    Type 2 diabetes mellitus without complication, without long-term current use of insulin ([facility] ) 2020             Review of Systems   Constitutional: Negative for chills and fever  HENT: Negative for ear pain and sore throat  Eyes: Negative for pain and visual disturbance  Respiratory: Negative for cough and shortness of breath  Cardiovascular: Negative for chest pain and palpitations  Gastrointestinal: Negative for abdominal pain and vomiting  Genitourinary: Negative for dysuria and hematuria  Musculoskeletal: Negative for arthralgias and back pain  Skin: Negative for color change and rash  Neurological: Negative for seizures and syncope  All other systems reviewed and are negative          Objective:      /84   Ht 5' 4" (1 626 m)   Wt 78 9 kg (174 lb)   BMI 29 87 kg/m²     No results found for: PSA       Physical Exam  Vitals reviewed  Constitutional:       General: She is not in acute distress  Appearance: Normal appearance  She is obese  She is not ill-appearing, toxic-appearing or diaphoretic  HENT:      Head: Normocephalic and atraumatic  Eyes:      Extraocular Movements: Extraocular movements intact  Pupils: Pupils are equal, round, and reactive to light  Pulmonary:      Effort: Pulmonary effort is normal    Abdominal:      General: Abdomen is flat  There is no distension  Palpations: Abdomen is soft  There is no mass  Tenderness: There is no abdominal tenderness  There is no right CVA tenderness, left CVA tenderness, guarding or rebound  Hernia: No hernia is present  Musculoskeletal:      Right lower leg: No edema  Left lower leg: No edema  Skin:     General: Skin is warm  Neurological:      General: No focal deficit present  Mental Status: She is alert and oriented to person, place, and time  Mental status is at baseline  Psychiatric:         Mood and Affect: Mood normal          Behavior: Behavior normal          Thought Content: Thought content normal                    CT done vis PCP on 8/3/21 for kidney stone:  IMPRESSION:     1   3 5 cm left staghorn calculus with hydronephrosis  2   Cluster of multiple subcentimeter calculi in a left lower pole calyx    3   3 cm fluid containing structure in the upper pole the left kidney, probably a dilated calyx or, possibly, a renal sinus cyst   4   Stranding in the left renal pelvic and periureteric fat,, consistent with pyelitis and ureteritis   Intrarenal infection, including abscess, cannot be excluded without intravenous contrast   IV contrast would also be helpful to determine the   functional status of the left kidney   This determination would be especially helpful if there is concern regarding xanthogranulomatous pyelonephritis  5   Several mildly prominent left para-aortic lymph nodes at and inferior to the left renal hilum, likely reactive, in response to left renal inflammation  Orders  Orders Placed This Encounter   Procedures    CT renal protocol     Ct 3 phase (without, with, delayed)     Standing Status:   Future     Standing Expiration Date:   10/21/2021     Scheduling Instructions:      Nothing to eat 3 hours prior to your test   Clear liquids are also permitted up until the time of the scan  Clear liquids include water, black coffee or tea, apple juice or clear broth  If possible wear clothing without any metal in the abdomen area  Sweat suit, shorts, sports bra or bra without underwire may eliminate the need to change  Please bring your insurance cards, a form of photo ID and a list of your medications with you  Arrive 15 minutes prior to your appointment time in order to register  On the day of your test, please bring any prior CT or MRI studies of this area with you that were not performed at a [facility]  To schedule this appointment, please contact Central Scheduling at [phone]  Order Specific Question:   Is the patient pregnant? Answer:   No     Order Specific Question:   What is the patient's sedation requirement? Answer:   No Sedation     Order Specific Question:   Contrast information:     Answer:   IV     Order Specific Question:   Did the patient ever have a reaction to x-ray dye? If yes, please verify the type of allergy and order the contrast allergy prep       Answer:   No     Order Specific Question:   Release to patient through [employer]     Answer:   Immediate     Order Specific Question:   Reason for Exam (FREE TEXT)     Answer:   evaluate left kidney including upper pole dilation and staghorn calculus and renal flow    POCT Measure PVR

## 2021-09-21 NOTE — PATIENT INSTRUCTIONS
Percutaneous Nephrolithotomy   WHAT YOU NEED TO KNOW:   Percutaneous nephrolithotomy is surgery to remove kidney stones  HOW TO PREPARE:   The week before your surgery:   · Arrange to have someone drive you home after your surgery  · Tell your surgeon about all medicines you currently take  He or she will tell you if you need to stop any medicine for surgery, and when to stop  He or she will tell you which medicines to take or not take on the day of surgery  · You may be given antibiotics to help prevent an infection caused by bacteria  Tell your surgeon if you had an allergic reaction to antibiotics  · Contrast liquid may be used during your surgery to help your surgeon see the kidney better  Tell him or her if you have ever had an allergic reaction to contrast liquid  · You may need to have blood and urine tests  You may also need an ultrasound and x-rays of the urinary tract, or a CT scan  The night before your surgery: You may be told not to eat or drink anything after midnight  The day of your surgery:   · You or a close family member will be asked to sign a legal document called a consent form  It gives healthcare providers permission to do the procedure or surgery  It also explains the problems that may happen, and your choices  Make sure all your questions are answered before you sign this form  · Take only the medicines your surgeon told you to take  · An IV will be placed into a vein  You may be given medicine or liquids through the IV  · An anesthesiologist will talk to you before your surgery  Tell him or her if you or anyone in your family had a problem with anesthesia  General anesthesia will be given to keep you asleep and free from pain during surgery  WHAT WILL HAPPEN:   What will happen: An incision will be made in your back  A nephroscope will be put through the incision and into your kidney near the stone   The nephroscope is a long tube with a magnifying glass and a light on the end  Healthcare providers may use x-rays or an ultrasound to help guide the scope  The nephroscope and other tools will be used to gently remove the stone  A machine such as a lithotriptor or an ultrasound may be used to break the stones into smaller pieces  The stone will be sent to the lab for tests  The incision will be closed with stitches or surgical tape  After your surgery: You will be taken to a room to rest until you are fully awake  Healthcare providers will monitor you closely for any problems  Do not get out of bed until your healthcare provider says it is okay  When your healthcare provider sees that you are okay, you will be taken to your hospital room  · Your incision will be covered by a bandage  This bandage keeps the area clean and dry to help prevent infection  A healthcare provider may remove the dressing soon after surgery to check the incision  · It is normal for urine to leak out of the incision for a few days after surgery  CONTACT YOUR HEALTHCARE PROVIDER IF:   · You have a fever  · You get a cold or the flu  · You have questions or concerns about your surgery  RISKS:   You may get an infection or bleed more than expected  You could have trouble breathing  Organs such as the liver, lungs, and spleen could be damaged during surgery  Scars may form where the stone was removed  Healthcare providers may not be able to remove your kidney stone, and you may need another surgery  You may develop a life-threatening blood clot  CARE AGREEMENT:   You have the right to help plan your care  Learn about your health condition and how it may be treated  Discuss treatment options with your healthcare providers to decide what care you want to receive  You always have the right to refuse treatment  © Copyright [employer] 2021 Information is for End User's use only and may not be sold, redistributed or otherwise used for commercial purposes   All illustrations and images included in CareNotes® are the copyrighted property of A D A M , Inc  or Theresa Donovan   The above information is an  only  It is not intended as medical advice for individual conditions or treatments  Talk to your doctor, nurse or pharmacist before following any medical regimen to see if it is safe and effective for you

## 2021-09-21 NOTE — ASSESSMENT & PLAN NOTE
Patient is emptying well based on postvoid residual of 17 cc today    And is possible she has some difficulty voiding related to history of hysterectomy and or diabetic related changes to the bladder

## 2021-09-30 ENCOUNTER — OFFICE VISIT (OUTPATIENT)
Dept: INTERNAL MEDICINE CLINIC | Facility: CLINIC | Age: 56
End: 2021-09-30
Payer: COMMERCIAL

## 2021-09-30 ENCOUNTER — CLINICAL SUPPORT (OUTPATIENT)
Dept: INTERNAL MEDICINE CLINIC | Facility: CLINIC | Age: 56
End: 2021-09-30
Payer: COMMERCIAL

## 2021-09-30 VITALS
SYSTOLIC BLOOD PRESSURE: 130 MMHG | WEIGHT: 169 LBS | DIASTOLIC BLOOD PRESSURE: 72 MMHG | BODY MASS INDEX: 29.01 KG/M2 | HEART RATE: 76 BPM

## 2021-09-30 DIAGNOSIS — N20.0 STAGHORN RENAL CALCULUS: ICD-10-CM

## 2021-09-30 DIAGNOSIS — E11.65 UNCONTROLLED TYPE 2 DIABETES MELLITUS WITH HYPERGLYCEMIA (HCC): ICD-10-CM

## 2021-09-30 DIAGNOSIS — Z23 NEED FOR VACCINATION: ICD-10-CM

## 2021-09-30 DIAGNOSIS — E11.65 UNCONTROLLED TYPE 2 DIABETES MELLITUS WITH HYPERGLYCEMIA (HCC): Primary | ICD-10-CM

## 2021-09-30 PROCEDURE — 90682 RIV4 VACC RECOMBINANT DNA IM: CPT

## 2021-09-30 PROCEDURE — 99214 OFFICE O/P EST MOD 30 MIN: CPT | Performed by: INTERNAL MEDICINE

## 2021-09-30 PROCEDURE — 90471 IMMUNIZATION ADMIN: CPT

## 2021-09-30 RX ORDER — MELATONIN
1000 DAILY
COMMUNITY

## 2021-09-30 RX ORDER — CHLORAL HYDRATE 500 MG
1000 CAPSULE ORAL DAILY
COMMUNITY
End: 2022-02-28

## 2021-09-30 RX ORDER — INSULIN GLARGINE 100 [IU]/ML
INJECTION, SOLUTION SUBCUTANEOUS
Qty: 30 ML | Refills: 1
Start: 2021-09-30 | End: 2021-11-11

## 2021-09-30 NOTE — ASSESSMENT & PLAN NOTE
Lab Results   Component Value Date    HGBA1C 13 2 (H) 08/04/2021   She met with our clinical pharmacist and Basaglar dose to be increase in increments of 2 units based on sugar readings   Professional CGM to be arranged  She should check her sugar 2 times a day-fasting and before bed

## 2021-09-30 NOTE — PROGRESS NOTES
Assessment/Plan:    Uncontrolled type 2 diabetes mellitus with hyperglycemia ([facility] [address] )    Lab Results   Component Value Date    HGBA1C 13 2 (H) 08/04/2021   She met with our clinical pharmacist and Basaglar dose to be increase in increments of 2 units based on sugar readings  Professional CGM to be arranged  She should check her sugar 2 times a day-fasting and before bed      Staghorn renal calculus  F/U with urology  CT scheduled in 2 weeks    BMI Counseling: Body mass index is 29 01 kg/m²  The BMI is above normal  Nutrition recommendations include moderation in carbohydrate intake  Problem List Items Addressed This Visit        Endocrine    Uncontrolled type 2 diabetes mellitus with hyperglycemia ([facility] [address] ) - Primary       Lab Results   Component Value Date    HGBA1C 13 2 (H) 08/04/2021   She met with our clinical pharmacist and Basaglar dose to be increase in increments of 2 units based on sugar readings  Professional CGM to be arranged  She should check her sugar 2 times a day-fasting and before bed           Relevant Orders    Ambulatory referral to clinical pharmacy    CBC and differential    Comprehensive metabolic panel    HEMOGLOBIN A1C W/ EAG ESTIMATION    Lipid Panel with Direct LDL reflex    TSH, 3rd generation with Free T4 reflex       Genitourinary    Staghorn renal calculus     F/U with urology  CT scheduled in 2 weeks           Other Visit Diagnoses     Need for vaccination        Relevant Orders    influenza vaccine, quadrivalent, recombinant, PF, 0 5 mL, for patients 18 yr+ (FLUBLOK) (Completed)            Subjective:      Patient ID: Bartolome Ken is a 60 y o  female      HPI  Here for a follow up  Feeling well overall  DM that is poorly controlled with A1C in August of 13 2  She was started on Basaglar and is on 13units  Her FBS is 185-200s  She has stopped eating watermelon   Denies hypoglycemic spells  She was also found to have a staghorn calculus on the left during her work up for right flank pain  She saw urology and is getting another CT    The following portions of the patient's history were reviewed and updated as appropriate: allergies, current medications, past family history, past medical history, past social history, past surgical history and problem list     Review of Systems   Constitutional: Negative for chills and fever  Respiratory: Negative for cough and shortness of breath  Cardiovascular: Negative for chest pain and palpitations  Gastrointestinal: Negative for abdominal pain, constipation, diarrhea, nausea and vomiting  Genitourinary: Negative for dysuria and flank pain  Feeling of incomplete emptying   Neurological: Negative for dizziness and headaches  Objective:      /72   Pulse 76   Wt 76 7 kg (169 lb)   BMI 29 01 kg/m²          Physical Exam  Constitutional:       General: She is not in acute distress  Appearance: She is well-developed  She is not ill-appearing, toxic-appearing or diaphoretic  HENT:      Head: Normocephalic and atraumatic  Eyes:      Conjunctiva/sclera: Conjunctivae normal    Cardiovascular:      Rate and Rhythm: Normal rate and regular rhythm  Heart sounds: Normal heart sounds  No murmur heard  Pulmonary:      Effort: Pulmonary effort is normal  No respiratory distress  Breath sounds: Normal breath sounds  No wheezing or rales  Abdominal:      General: There is no distension  Palpations: Abdomen is soft  There is no mass  Tenderness: There is no abdominal tenderness  There is no guarding or rebound  Musculoskeletal:      Cervical back: Neck supple  Right lower leg: No edema  Left lower leg: No edema  Skin:     General: Skin is warm and dry  Neurological:      Mental Status: She is alert and oriented to person, place, and time  Psychiatric:         Mood and Affect: Mood normal          Behavior: Behavior normal          Thought Content:  Thought content normal          Judgment: Judgment normal

## 2021-09-30 NOTE — LETTER
2021    Petra Shaffer CATHY 1965 is under my professional care  She is diabetic and is on life sustaining  medications including insulin to control this  Diabetes if uncontrolled can cause multiple complications      Sincerely,     Steff Pickens MD

## 2021-09-30 NOTE — PROGRESS NOTES
Sadie Pharmacist    Reason for visit: Appointment with 60y o  year old for management of T2DM  Seen immediately prior to PCP appointment     Current DM Regimen:   Metformin    Lantus    ASSESSMENT/PLAN                                                                                     1  Type 2 diabetes: goal A1c <6 5% based on AACE/ACE guidelines  Most recent A1c above goal but not reflective of current treatment as patient was not on insulin  FBG readings above goal but improved; no hypoglycemia  Uncertain sleep s/sx r/t Basaglar  Would benefit from SGLT2i however hesitant to rec at this time given A1c and limited A1c lowering potential     ADA guidelines recommend 2 of 3 specimens of UACR collected within 3-6 month period should be abnormal before considering a patient to have albuminuria  BMP shows hyperglycemia with possible dilutional hypochloremia; patient was fasting  Duration: < 10 years (dx 2013)  Microvascular complications: microalbuminuria  Macrovascular complications: none  (Yes ) Aspirin (Yes) Statin (Yes) ACEI/ARB  Eye Exam:  11/2020 - Dr Joshua Alas, msg sent to [facility]  Lab Results   Component Value Date    LEFTDIABRET None 01/09/2019    RIGHTDIABRET None 01/09/2019   Foot Exam: 9/2020       Most recent labs and diabetes goals discussed with patient in clinic today   MEDICATIONS: If PCP is in agreeance, increase Lantus to 16 units (increase by 2 units every 3 days FBG > 130) continue metformin    o Rx updated per cpa    SMBG: twice daily  -  o Patient will look into Pro CGM  Handout provided to patient   TLCs: Re-enforced the importance of a Diabetic Diet, exercise 30 minutes 5 days a week as tolerated, weight loss/control; discussion on benefits of plant based diet  Patient will start recording food   Patient will meet with dietician    2   Medication Reconciliation: Medication list reviewed with pt at today's visit  Discrepancies as discussed below   Medication list updated to reflect medications pt is currently taking    Follow-Up: 4 weeks  _x_ Home Monitoring Records, BG      SUBJECTIVE                                                                                                              1  Medication Adherence: Medication list reviewed with patient, reports the following discrepancies/problems:   Basaglar: no longer taking   Lantus: 13 units daily   MetFORMIN: taking as prescribed    Misses doses:  no      2  Medication Efficacy:    Review of Systems   Gastrointestinal:        Has noticed slight increase in bloating/weight gain     SMBG readings (no log, per memory): checking once daily  FB, sometimes just a little higher than 200    SMBG:   Glucometer: Yes, Brand: One Touch Ultra Mini    Hypoglycemia history: 0 SMBG readings < 70mg/dL since last appt   Unsymptomatic hypoglycemia: No, reports hypoglycemia s/sx x0 since last appt    3  Lifestyle: using SF sweetener, tries to eat 5 smaller meals  Cooks food at home  Cares for MIL and reports stress r/t to this  Previously tracked foods in [facility] but stopped a few months ago, felt this helped her  Has been working on food intake since last appointment     Last visit with dietician: referral pending       Diet Recall      B - Food: oatmeal with almond milk or eggs     Snack - greek yogurt ([employer]ik[employer]) or protein shake     L - Food: soup or sandwiches with low-CHO bread      Snack - greek yogurt or string cheese     D - Food: turkey chili with cheese last night      Daily Beverages: water     Exercise: walks 1 mile twice daily      Social History     Tobacco Use   Smoking Status Never Smoker   Smokeless Tobacco Never Used     Social History     Substance and Sexual Activity   Alcohol Use Yes    Comment: social       OBJECTIVE Pertinent Lab Data:   Patient was fasting for most recent labs  Lab Results   Component Value Date    SODIUM 136 08/04/2021    K 4 3 08/04/2021    EGFR 73 08/04/2021    CREATININE 0 88 08/04/2021    GLUF 281 (H) 08/04/2021    MICROALBCRE 1,006 (H) 07/30/2021       Lab Results   Component Value Date    HGBA1C 13 2 (H) 08/04/2021    HGBA1C 9 5 (A) 09/11/2020    HGBA1C 7 1 (H) 08/10/2018     Demographics  Interaction Method: Face to Face  Type of Intervention: Follow-Up    Topic(s) Addressed  Diabetes    Intervention(s) Made    Pharmacologic:      Medication Adjustment - Dose or Frequency: Basaglar    Non-Pharmacologic:      Other: Lifestyle, SMBG    Tool(s) Used  Not Applicable    Time Spent:   Time Spent in Direct Patient Care: 40 minutes    Time Spent in Care Coordination: 20 minutes    Recommendation(s) Accepted by the Patient/Caregiver:  All Accepted

## 2021-10-01 ENCOUNTER — TELEPHONE (OUTPATIENT)
Dept: ADMINISTRATIVE | Facility: OTHER | Age: 56
End: 2021-10-01

## 2021-10-18 ENCOUNTER — HOSPITAL ENCOUNTER (OUTPATIENT)
Dept: CT IMAGING | Facility: HOSPITAL | Age: 56
Discharge: HOME | End: 2021-10-18
Attending: UROLOGY
Payer: COMMERCIAL

## 2021-10-18 DIAGNOSIS — N20.0 STAGHORN RENAL CALCULUS: ICD-10-CM

## 2021-10-18 PROCEDURE — 74178 CT ABD&PLV WO CNTR FLWD CNTR: CPT

## 2021-10-18 PROCEDURE — G1004 CDSM NDSC: HCPCS

## 2021-10-18 RX ADMIN — IOHEXOL 100 ML: 350 INJECTION, SOLUTION INTRAVENOUS at 09:42

## 2021-10-22 ENCOUNTER — VBI (OUTPATIENT)
Dept: ADMINISTRATIVE | Facility: OTHER | Age: 56
End: 2021-10-22

## 2021-11-08 ENCOUNTER — APPOINTMENT (OUTPATIENT)
Dept: LAB | Facility: HOSPITAL | Age: 56
End: 2021-11-08
Payer: COMMERCIAL

## 2021-11-08 DIAGNOSIS — N20.0 STAGHORN RENAL CALCULUS: ICD-10-CM

## 2021-11-08 DIAGNOSIS — E11.65 UNCONTROLLED TYPE 2 DIABETES MELLITUS WITH HYPERGLYCEMIA (HCC): ICD-10-CM

## 2021-11-08 LAB
ALBUMIN SERPL BCP-MCNC: 4.5 G/DL (ref 3.4–4.8)
ALP SERPL-CCNC: 95.8 U/L (ref 35–140)
ALT SERPL W P-5'-P-CCNC: 18 U/L (ref 5–54)
ANION GAP SERPL CALCULATED.3IONS-SCNC: 10 MMOL/L (ref 4–13)
AST SERPL W P-5'-P-CCNC: 15 U/L (ref 15–41)
BASOPHILS # BLD AUTO: 0.04 THOUSANDS/ΜL (ref 0–0.1)
BASOPHILS NFR BLD AUTO: 0 % (ref 0–1)
BILIRUB SERPL-MCNC: 0.27 MG/DL (ref 0.3–1.2)
BUN SERPL-MCNC: 16 MG/DL (ref 6–20)
CALCIUM SERPL-MCNC: 9.9 MG/DL (ref 8.4–10.2)
CHLORIDE SERPL-SCNC: 101 MMOL/L (ref 96–108)
CHOLEST SERPL-MCNC: 178 MG/DL
CO2 SERPL-SCNC: 28 MMOL/L (ref 22–33)
CREAT SERPL-MCNC: 0.76 MG/DL (ref 0.4–1.1)
EOSINOPHIL # BLD AUTO: 0.27 THOUSAND/ΜL (ref 0–0.61)
EOSINOPHIL NFR BLD AUTO: 3 % (ref 0–6)
ERYTHROCYTE [DISTWIDTH] IN BLOOD BY AUTOMATED COUNT: 13.8 % (ref 11.6–15.1)
EST. AVERAGE GLUCOSE BLD GHB EST-MCNC: 192 MG/DL
GFR SERPL CREATININE-BSD FRML MDRD: 87 ML/MIN/1.73SQ M
GLUCOSE P FAST SERPL-MCNC: 130 MG/DL (ref 70–105)
HBA1C MFR BLD: 8.3 %
HCT VFR BLD AUTO: 34.8 % (ref 34.8–46.1)
HDLC SERPL-MCNC: 41 MG/DL
HGB BLD-MCNC: 11.1 G/DL (ref 11.5–15.4)
IMM GRANULOCYTES # BLD AUTO: 0.02 THOUSAND/UL (ref 0–0.2)
IMM GRANULOCYTES NFR BLD AUTO: 0 % (ref 0–2)
LDLC SERPL CALC-MCNC: 87 MG/DL (ref 0–100)
LYMPHOCYTES # BLD AUTO: 4.28 THOUSANDS/ΜL (ref 0.6–4.47)
LYMPHOCYTES NFR BLD AUTO: 47 % (ref 14–44)
MCH RBC QN AUTO: 26.8 PG (ref 26.8–34.3)
MCHC RBC AUTO-ENTMCNC: 31.9 G/DL (ref 31.4–37.4)
MCV RBC AUTO: 84 FL (ref 82–98)
MONOCYTES # BLD AUTO: 0.58 THOUSAND/ΜL (ref 0.17–1.22)
MONOCYTES NFR BLD AUTO: 6 % (ref 4–12)
NEUTROPHILS # BLD AUTO: 4.03 THOUSANDS/ΜL (ref 1.85–7.62)
NEUTS SEG NFR BLD AUTO: 44 % (ref 43–75)
PLATELET # BLD AUTO: 402 THOUSANDS/UL (ref 149–390)
PMV BLD AUTO: 9.4 FL (ref 8.9–12.7)
POTASSIUM SERPL-SCNC: 4.4 MMOL/L (ref 3.5–5)
PROT SERPL-MCNC: 7.6 G/DL (ref 6.4–8.3)
RBC # BLD AUTO: 4.14 MILLION/UL (ref 3.81–5.12)
SODIUM SERPL-SCNC: 139 MMOL/L (ref 133–145)
TRIGL SERPL-MCNC: 251.4 MG/DL
TSH SERPL DL<=0.05 MIU/L-ACNC: 1.33 UIU/ML (ref 0.34–5.6)
WBC # BLD AUTO: 9.22 THOUSAND/UL (ref 4.31–10.16)

## 2021-11-08 PROCEDURE — 80053 COMPREHEN METABOLIC PANEL: CPT

## 2021-11-08 PROCEDURE — 83036 HEMOGLOBIN GLYCOSYLATED A1C: CPT

## 2021-11-08 PROCEDURE — 84443 ASSAY THYROID STIM HORMONE: CPT

## 2021-11-08 PROCEDURE — 36415 COLL VENOUS BLD VENIPUNCTURE: CPT

## 2021-11-08 PROCEDURE — 80061 LIPID PANEL: CPT

## 2021-11-08 PROCEDURE — 3052F HG A1C>EQUAL 8.0%<EQUAL 9.0%: CPT | Performed by: UROLOGY

## 2021-11-08 PROCEDURE — 85025 COMPLETE CBC W/AUTO DIFF WBC: CPT

## 2021-11-11 ENCOUNTER — CLINICAL SUPPORT (OUTPATIENT)
Dept: INTERNAL MEDICINE CLINIC | Facility: CLINIC | Age: 56
End: 2021-11-11
Payer: COMMERCIAL

## 2021-11-11 ENCOUNTER — OFFICE VISIT (OUTPATIENT)
Dept: INTERNAL MEDICINE CLINIC | Facility: CLINIC | Age: 56
End: 2021-11-11
Payer: COMMERCIAL

## 2021-11-11 ENCOUNTER — TELEPHONE (OUTPATIENT)
Dept: INTERNAL MEDICINE CLINIC | Facility: CLINIC | Age: 56
End: 2021-11-11

## 2021-11-11 VITALS
WEIGHT: 177.4 LBS | SYSTOLIC BLOOD PRESSURE: 128 MMHG | OXYGEN SATURATION: 99 % | DIASTOLIC BLOOD PRESSURE: 76 MMHG | BODY MASS INDEX: 30.45 KG/M2 | HEART RATE: 78 BPM

## 2021-11-11 VITALS
HEART RATE: 78 BPM | BODY MASS INDEX: 30.45 KG/M2 | DIASTOLIC BLOOD PRESSURE: 76 MMHG | OXYGEN SATURATION: 99 % | WEIGHT: 177.4 LBS | SYSTOLIC BLOOD PRESSURE: 128 MMHG

## 2021-11-11 DIAGNOSIS — E11.65 UNCONTROLLED TYPE 2 DIABETES MELLITUS WITH HYPERGLYCEMIA (HCC): Primary | ICD-10-CM

## 2021-11-11 DIAGNOSIS — I10 BENIGN ESSENTIAL HYPERTENSION: ICD-10-CM

## 2021-11-11 DIAGNOSIS — D64.9 ANEMIA, UNSPECIFIED TYPE: ICD-10-CM

## 2021-11-11 DIAGNOSIS — E78.2 MIXED HYPERLIPIDEMIA: ICD-10-CM

## 2021-11-11 DIAGNOSIS — N20.0 STAGHORN RENAL CALCULUS: ICD-10-CM

## 2021-11-11 LAB
SL AMB  POCT GLUCOSE, UA: ABNORMAL
SL AMB LEUKOCYTE ESTERASE,UA: 2
SL AMB POCT BILIRUBIN,UA: ABNORMAL
SL AMB POCT BLOOD,UA: 3
SL AMB POCT CLARITY,UA: CLEAR
SL AMB POCT COLOR,UA: YELLOW
SL AMB POCT KETONES,UA: ABNORMAL
SL AMB POCT NITRITE,UA: POSITIVE
SL AMB POCT PH,UA: 6
SL AMB POCT SPECIFIC GRAVITY,UA: 1.01
SL AMB POCT URINE PROTEIN: 0.15
SL AMB POCT UROBILINOGEN: 3.5

## 2021-11-11 PROCEDURE — 81002 URINALYSIS NONAUTO W/O SCOPE: CPT | Performed by: INTERNAL MEDICINE

## 2021-11-11 PROCEDURE — 3078F DIAST BP <80 MM HG: CPT | Performed by: INTERNAL MEDICINE

## 2021-11-11 PROCEDURE — 3061F NEG MICROALBUMINURIA REV: CPT | Performed by: UROLOGY

## 2021-11-11 PROCEDURE — 99214 OFFICE O/P EST MOD 30 MIN: CPT | Performed by: INTERNAL MEDICINE

## 2021-11-11 PROCEDURE — 3074F SYST BP LT 130 MM HG: CPT | Performed by: INTERNAL MEDICINE

## 2021-11-11 PROCEDURE — 81001 URINALYSIS AUTO W/SCOPE: CPT

## 2021-11-11 RX ORDER — INSULIN GLARGINE 100 [IU]/ML
20 INJECTION, SOLUTION SUBCUTANEOUS
Qty: 30 ML | Refills: 1
Start: 2021-11-11 | End: 2022-06-09 | Stop reason: SDUPTHER

## 2021-11-11 RX ORDER — EMPAGLIFLOZIN 25 MG/1
25 TABLET, FILM COATED ORAL EVERY MORNING
Qty: 30 TABLET | Refills: 2 | Status: SHIPPED | OUTPATIENT
Start: 2021-11-11 | End: 2021-11-22 | Stop reason: ALTCHOICE

## 2021-11-11 RX ORDER — ATORVASTATIN CALCIUM 40 MG/1
40 TABLET, FILM COATED ORAL
Qty: 90 TABLET | Refills: 1 | Status: SHIPPED | OUTPATIENT
Start: 2021-11-11 | End: 2022-02-28 | Stop reason: SDUPTHER

## 2021-11-12 ENCOUNTER — TELEPHONE (OUTPATIENT)
Dept: LAB | Facility: HOSPITAL | Age: 56
End: 2021-11-12

## 2021-11-12 LAB
BACTERIA UR QL AUTO: ABNORMAL /HPF
BILIRUB UR QL STRIP: NEGATIVE
CLARITY UR: ABNORMAL
COLOR UR: YELLOW
GLUCOSE UR STRIP-MCNC: NEGATIVE MG/DL
HGB UR QL STRIP.AUTO: ABNORMAL
HYALINE CASTS #/AREA URNS LPF: ABNORMAL /LPF
KETONES UR STRIP-MCNC: NEGATIVE MG/DL
LEUKOCYTE ESTERASE UR QL STRIP: ABNORMAL
NITRITE UR QL STRIP: POSITIVE
NON-SQ EPI CELLS URNS QL MICRO: ABNORMAL /HPF
PH UR STRIP.AUTO: 6 [PH]
PROT UR STRIP-MCNC: ABNORMAL MG/DL
RBC #/AREA URNS AUTO: ABNORMAL /HPF
SP GR UR STRIP.AUTO: 1.02 (ref 1–1.03)
UROBILINOGEN UR QL STRIP.AUTO: 0.2 E.U./DL
WBC #/AREA URNS AUTO: ABNORMAL /HPF

## 2021-11-22 DIAGNOSIS — E11.65 UNCONTROLLED TYPE 2 DIABETES MELLITUS WITH HYPERGLYCEMIA (HCC): Primary | ICD-10-CM

## 2021-11-22 RX ORDER — DAPAGLIFLOZIN 10 MG/1
10 TABLET, FILM COATED ORAL DAILY
Qty: 30 TABLET | Refills: 1 | Status: SHIPPED | OUTPATIENT
Start: 2021-11-22 | End: 2021-12-03 | Stop reason: ALTCHOICE

## 2021-11-23 ENCOUNTER — OFFICE VISIT (OUTPATIENT)
Dept: UROLOGY | Facility: CLINIC | Age: 56
End: 2021-11-23
Payer: COMMERCIAL

## 2021-11-23 VITALS
SYSTOLIC BLOOD PRESSURE: 138 MMHG | HEIGHT: 64 IN | WEIGHT: 174 LBS | DIASTOLIC BLOOD PRESSURE: 84 MMHG | BODY MASS INDEX: 29.71 KG/M2

## 2021-11-23 DIAGNOSIS — E11.65 UNCONTROLLED TYPE 2 DIABETES MELLITUS WITH HYPERGLYCEMIA (HCC): ICD-10-CM

## 2021-11-23 DIAGNOSIS — N20.0 STAGHORN RENAL CALCULUS: Primary | ICD-10-CM

## 2021-11-23 PROCEDURE — 99214 OFFICE O/P EST MOD 30 MIN: CPT | Performed by: UROLOGY

## 2021-11-23 PROCEDURE — 3008F BODY MASS INDEX DOCD: CPT | Performed by: UROLOGY

## 2021-11-23 PROCEDURE — 1036F TOBACCO NON-USER: CPT | Performed by: UROLOGY

## 2021-11-23 RX ORDER — ACETAMINOPHEN 325 MG/1
975 TABLET ORAL ONCE
Status: CANCELLED | OUTPATIENT
Start: 2021-11-23 | End: 2021-11-23

## 2021-11-24 ENCOUNTER — TELEPHONE (OUTPATIENT)
Dept: UROLOGY | Facility: CLINIC | Age: 56
End: 2021-11-24

## 2021-11-24 ENCOUNTER — PREP FOR PROCEDURE (OUTPATIENT)
Dept: UROLOGY | Facility: CLINIC | Age: 56
End: 2021-11-24

## 2021-11-24 ENCOUNTER — PREP FOR PROCEDURE (OUTPATIENT)
Dept: INTERVENTIONAL RADIOLOGY/VASCULAR | Facility: CLINIC | Age: 56
End: 2021-11-24

## 2021-11-24 DIAGNOSIS — N20.0 KIDNEY STONE: Primary | ICD-10-CM

## 2021-11-24 DIAGNOSIS — N20.0 STAGHORN CALCULUS: Primary | ICD-10-CM

## 2021-11-24 RX ORDER — SODIUM CHLORIDE 9 MG/ML
75 INJECTION, SOLUTION INTRAVENOUS CONTINUOUS
Status: CANCELLED | OUTPATIENT
Start: 2021-11-24

## 2021-12-02 ENCOUNTER — TELEPHONE (OUTPATIENT)
Dept: INTERNAL MEDICINE CLINIC | Facility: CLINIC | Age: 56
End: 2021-12-02

## 2021-12-03 ENCOUNTER — TELEPHONE (OUTPATIENT)
Dept: INTERNAL MEDICINE CLINIC | Facility: CLINIC | Age: 56
End: 2021-12-03

## 2021-12-03 DIAGNOSIS — E11.65 UNCONTROLLED TYPE 2 DIABETES MELLITUS WITH HYPERGLYCEMIA (HCC): Primary | ICD-10-CM

## 2021-12-09 ENCOUNTER — CLINICAL SUPPORT (OUTPATIENT)
Dept: NUTRITION | Facility: HOSPITAL | Age: 56
End: 2021-12-09
Payer: COMMERCIAL

## 2021-12-09 VITALS — HEIGHT: 64 IN | BODY MASS INDEX: 29.71 KG/M2 | WEIGHT: 174 LBS

## 2021-12-09 DIAGNOSIS — E11.65 UNCONTROLLED TYPE 2 DIABETES MELLITUS WITH HYPERGLYCEMIA (HCC): ICD-10-CM

## 2021-12-09 PROCEDURE — 97802 MEDICAL NUTRITION INDIV IN: CPT

## 2021-12-22 ENCOUNTER — HOSPITAL ENCOUNTER (OUTPATIENT)
Dept: MAMMOGRAPHY | Facility: MEDICAL CENTER | Age: 56
Discharge: HOME | End: 2021-12-22
Payer: COMMERCIAL

## 2021-12-22 VITALS — HEIGHT: 64 IN | WEIGHT: 173.94 LBS | BODY MASS INDEX: 29.7 KG/M2

## 2021-12-22 DIAGNOSIS — Z12.31 SCREENING MAMMOGRAM, ENCOUNTER FOR: ICD-10-CM

## 2021-12-22 PROCEDURE — 77063 BREAST TOMOSYNTHESIS BI: CPT

## 2021-12-22 PROCEDURE — 77067 SCR MAMMO BI INCL CAD: CPT

## 2022-02-09 ENCOUNTER — TELEPHONE (OUTPATIENT)
Dept: INTERNAL MEDICINE CLINIC | Facility: CLINIC | Age: 57
End: 2022-02-09

## 2022-02-09 NOTE — TELEPHONE ENCOUNTER
Patient's insurance is calling in regards to the appeal that was done for the [place]  They are asking if you still wanted to move forward with the appeal   They stated the jardiance was approved and would be the same class medication for the patient  Asking if patient will be prescribed the jardiance instead          Please advise

## 2022-02-21 ENCOUNTER — APPOINTMENT (OUTPATIENT)
Dept: LAB | Facility: CLINIC | Age: 57
End: 2022-02-21
Payer: MEDICARE

## 2022-02-21 ENCOUNTER — OFFICE VISIT (OUTPATIENT)
Dept: LAB | Facility: CLINIC | Age: 57
End: 2022-02-21
Payer: MEDICARE

## 2022-02-21 DIAGNOSIS — E78.2 MIXED HYPERLIPIDEMIA: ICD-10-CM

## 2022-02-21 DIAGNOSIS — N20.0 URIC ACID NEPHROLITHIASIS: ICD-10-CM

## 2022-02-21 DIAGNOSIS — I10 BENIGN ESSENTIAL HYPERTENSION: ICD-10-CM

## 2022-02-21 DIAGNOSIS — D64.9 ANEMIA, UNSPECIFIED TYPE: ICD-10-CM

## 2022-02-21 DIAGNOSIS — N20.0 STAGHORN CALCULUS: ICD-10-CM

## 2022-02-21 DIAGNOSIS — E11.65 UNCONTROLLED TYPE 2 DIABETES MELLITUS WITH HYPERGLYCEMIA (HCC): ICD-10-CM

## 2022-02-21 LAB
ABO GROUP BLD: NORMAL
ALBUMIN SERPL BCP-MCNC: 3.7 G/DL (ref 3.5–5)
ALP SERPL-CCNC: 135 U/L (ref 46–116)
ALT SERPL W P-5'-P-CCNC: 23 U/L (ref 12–78)
ANION GAP SERPL CALCULATED.3IONS-SCNC: 8 MMOL/L (ref 4–13)
AST SERPL W P-5'-P-CCNC: 14 U/L (ref 5–45)
BASOPHILS # BLD AUTO: 0.06 THOUSANDS/ΜL (ref 0–0.1)
BASOPHILS NFR BLD AUTO: 1 % (ref 0–1)
BILIRUB SERPL-MCNC: 0.16 MG/DL (ref 0.2–1)
BLD GP AB SCN SERPL QL: NEGATIVE
BUN SERPL-MCNC: 20 MG/DL (ref 5–25)
CALCIUM SERPL-MCNC: 9.3 MG/DL (ref 8.3–10.1)
CHLORIDE SERPL-SCNC: 105 MMOL/L (ref 100–108)
CHOLEST SERPL-MCNC: 162 MG/DL
CO2 SERPL-SCNC: 24 MMOL/L (ref 21–32)
CREAT SERPL-MCNC: 0.87 MG/DL (ref 0.6–1.3)
EOSINOPHIL # BLD AUTO: 0.27 THOUSAND/ΜL (ref 0–0.61)
EOSINOPHIL NFR BLD AUTO: 3 % (ref 0–6)
ERYTHROCYTE [DISTWIDTH] IN BLOOD BY AUTOMATED COUNT: 13.4 % (ref 11.6–15.1)
EST. AVERAGE GLUCOSE BLD GHB EST-MCNC: 157 MG/DL
FERRITIN SERPL-MCNC: 12 NG/ML (ref 8–388)
GFR SERPL CREATININE-BSD FRML MDRD: 73 ML/MIN/1.73SQ M
GLUCOSE P FAST SERPL-MCNC: 108 MG/DL (ref 65–99)
HBA1C MFR BLD: 7.1 %
HCT VFR BLD AUTO: 35.5 % (ref 34.8–46.1)
HDLC SERPL-MCNC: 40 MG/DL
HGB BLD-MCNC: 10.8 G/DL (ref 11.5–15.4)
IMM GRANULOCYTES # BLD AUTO: 0.04 THOUSAND/UL (ref 0–0.2)
IMM GRANULOCYTES NFR BLD AUTO: 0 % (ref 0–2)
IRON SATN MFR SERPL: 11 % (ref 15–50)
IRON SERPL-MCNC: 43 UG/DL (ref 50–170)
LDLC SERPL CALC-MCNC: 64 MG/DL (ref 0–100)
LYMPHOCYTES # BLD AUTO: 4.17 THOUSANDS/ΜL (ref 0.6–4.47)
LYMPHOCYTES NFR BLD AUTO: 41 % (ref 14–44)
MCH RBC QN AUTO: 26.2 PG (ref 26.8–34.3)
MCHC RBC AUTO-ENTMCNC: 30.4 G/DL (ref 31.4–37.4)
MCV RBC AUTO: 86 FL (ref 82–98)
MONOCYTES # BLD AUTO: 0.61 THOUSAND/ΜL (ref 0.17–1.22)
MONOCYTES NFR BLD AUTO: 6 % (ref 4–12)
NEUTROPHILS # BLD AUTO: 5.08 THOUSANDS/ΜL (ref 1.85–7.62)
NEUTS SEG NFR BLD AUTO: 49 % (ref 43–75)
NRBC BLD AUTO-RTO: 0 /100 WBCS
PLATELET # BLD AUTO: 361 THOUSANDS/UL (ref 149–390)
PMV BLD AUTO: 9.2 FL (ref 8.9–12.7)
POTASSIUM SERPL-SCNC: 4.2 MMOL/L (ref 3.5–5.3)
PROT SERPL-MCNC: 7.1 G/DL (ref 6.4–8.2)
RBC # BLD AUTO: 4.12 MILLION/UL (ref 3.81–5.12)
RH BLD: POSITIVE
SODIUM SERPL-SCNC: 137 MMOL/L (ref 136–145)
SPECIMEN EXPIRATION DATE: NORMAL
TIBC SERPL-MCNC: 405 UG/DL (ref 250–450)
TRIGL SERPL-MCNC: 288 MG/DL
VIT B12 SERPL-MCNC: 2447 PG/ML (ref 100–900)
WBC # BLD AUTO: 10.23 THOUSAND/UL (ref 4.31–10.16)

## 2022-02-21 PROCEDURE — 93005 ELECTROCARDIOGRAM TRACING: CPT

## 2022-02-21 PROCEDURE — 87077 CULTURE AEROBIC IDENTIFY: CPT

## 2022-02-21 PROCEDURE — 80061 LIPID PANEL: CPT

## 2022-02-21 PROCEDURE — 86850 RBC ANTIBODY SCREEN: CPT

## 2022-02-21 PROCEDURE — 86900 BLOOD TYPING SEROLOGIC ABO: CPT

## 2022-02-21 PROCEDURE — 82728 ASSAY OF FERRITIN: CPT

## 2022-02-21 PROCEDURE — 80053 COMPREHEN METABOLIC PANEL: CPT

## 2022-02-21 PROCEDURE — 87086 URINE CULTURE/COLONY COUNT: CPT

## 2022-02-21 PROCEDURE — 85025 COMPLETE CBC W/AUTO DIFF WBC: CPT

## 2022-02-21 PROCEDURE — 83550 IRON BINDING TEST: CPT

## 2022-02-21 PROCEDURE — 86901 BLOOD TYPING SEROLOGIC RH(D): CPT

## 2022-02-21 PROCEDURE — 36415 COLL VENOUS BLD VENIPUNCTURE: CPT

## 2022-02-21 PROCEDURE — 87186 SC STD MICRODIL/AGAR DIL: CPT

## 2022-02-21 PROCEDURE — 83540 ASSAY OF IRON: CPT

## 2022-02-21 PROCEDURE — 82607 VITAMIN B-12: CPT

## 2022-02-21 PROCEDURE — 83036 HEMOGLOBIN GLYCOSYLATED A1C: CPT

## 2022-02-22 LAB
ATRIAL RATE: 74 BPM
P AXIS: 64 DEGREES
PR INTERVAL: 154 MS
QRS AXIS: 31 DEGREES
QRSD INTERVAL: 86 MS
QT INTERVAL: 386 MS
QTC INTERVAL: 428 MS
T WAVE AXIS: 71 DEGREES
VENTRICULAR RATE: 74 BPM

## 2022-02-22 PROCEDURE — 93010 ELECTROCARDIOGRAM REPORT: CPT | Performed by: INTERNAL MEDICINE

## 2022-02-22 NOTE — PRE-PROCEDURE INSTRUCTIONS
Pre-Surgery Instructions:   Medication Instructions    albuterol (ProAir HFA) 90 mcg/act inhaler Instructed to take per normal schedule including DOS    aspirin 81 MG tablet Patient was instructed by Physician and understands   atorvastatin (LIPITOR) 40 mg tablet Take per normal schedule     cholecalciferol (VITAMIN D3) 1,000 units tablet Instructed to stop all Vitamins and Supplements over the counter from now until after surgery    cyanocobalamin (VITAMIN B-12) 500 MCG tablet Instructed to stop all Vitamins and Supplements over the counter from now until after surgery    Empagliflozin 25 MG TABS Instructed to take per normal schedule except DOS    insulin glargine (Lantus SoloStar) 100 units/mL injection pen Instructed to take per normal schedule except DOS    lisinopril (ZESTRIL) 5 mg tablet Instructed to take per normal schedule except DOS    metFORMIN (GLUCOPHAGE-XR) 500 mg 24 hr tablet Instructed to take per normal schedule except DOS    montelukast (SINGULAIR) 10 mg tablet Take per normal schedule     Omega-3 Fatty Acids (fish oil) 1,000 mg Instructed to stop all Vitamins and Supplements over the counter from now until after surgery    Pre-op medication, and showering instructions with antibacteral soap reviewed  Instructed to avoid all ASA and OTC Vit/Supp 1 week prior to surgery and to avoid NSAIDs 3 days prior to surgery per anesthesia instructions  Tylenol ok to take prn  ASA instructions given to pt by surgeons office  Pt  Verbalized understanding of current visitor restrictions  Pt  Verbalized an understanding of all instructions reviewed and offers no concerns at this time

## 2022-02-24 ENCOUNTER — TELEPHONE (OUTPATIENT)
Dept: UROLOGY | Facility: CLINIC | Age: 57
End: 2022-02-24

## 2022-02-24 NOTE — TELEPHONE ENCOUNTER
----- Message from Naveen Holt MD sent at 2/24/2022  1:17 PM EST -----  Please tell the patient her urine culture is showing E coli and I want her to take prescribed antibiotics starting next Monday in preparation for her upcoming stone surgery

## 2022-02-24 NOTE — TELEPHONE ENCOUNTER
Left message per communication consent  Advised patient of antibiotics script that Dr Latisha Muhammad sent to pharmacy, for her to start on Monday   Office number provided to return call with any questions

## 2022-02-25 ENCOUNTER — TELEPHONE (OUTPATIENT)
Dept: UROLOGY | Facility: CLINIC | Age: 57
End: 2022-02-25

## 2022-02-25 ENCOUNTER — TELEPHONE (OUTPATIENT)
Dept: OTHER | Facility: OTHER | Age: 57
End: 2022-02-25

## 2022-02-25 PROBLEM — J98.4 RESTRICTIVE AIRWAY DISEASE: Status: ACTIVE | Noted: 2022-02-25

## 2022-02-25 NOTE — TELEPHONE ENCOUNTER
Called patient back and had extensive conversation with her regarding the surgery and the IR tube placement  Tried to answer the technical questions to the best of my ability  Advised patient that I will ask either Dr Boogie Kevin or the PAs on Monday to call her  Patient verbalized understanding

## 2022-02-25 NOTE — TELEPHONE ENCOUNTER
Had extensive conversation with patient regarding her urine culture and if she has a UTI and the differing instructions she got regarding when she should be taking them  On the bottle it says for the stent removal and then via the encounter, staff told her to take the medicine starting on Monday leading up to surgery  She just keeps repeating 'why do I have to wait to start them? whats his reasoning? Why cant I take them now?"    She also has several questions regarding surgery and the nephrostomy and what the point of the nephrostomy is  Shes concerned that its firday and she wants to speak with someone before the weekend  I did comment that that might be challenging but I will try to get answers for her       Then spoke with Hua Nelson in office and he told to me have her just start the antibiotics now if she's experiencing symptoms

## 2022-02-25 NOTE — TELEPHONE ENCOUNTER
I called the patient and discuss her upcoming PCNL  We talked about abx for her e coli UTI  She is having sx so she will start them now  We discussed IR for PCNU and why this is done  We discussed she has restrictive airway disease  I will try to tell [facility] anesthesia team about this  She also knows to tell her anesthesia team about this  She asked about being prone and I explained she is intubated and monitored

## 2022-02-26 LAB — BACTERIA UR CULT: ABNORMAL

## 2022-02-28 ENCOUNTER — OFFICE VISIT (OUTPATIENT)
Dept: INTERNAL MEDICINE CLINIC | Facility: CLINIC | Age: 57
End: 2022-02-28
Payer: MEDICARE

## 2022-02-28 ENCOUNTER — CLINICAL SUPPORT (OUTPATIENT)
Dept: INTERNAL MEDICINE CLINIC | Facility: CLINIC | Age: 57
End: 2022-02-28
Payer: MEDICARE

## 2022-02-28 VITALS
HEART RATE: 102 BPM | WEIGHT: 176.6 LBS | DIASTOLIC BLOOD PRESSURE: 72 MMHG | HEIGHT: 63 IN | SYSTOLIC BLOOD PRESSURE: 118 MMHG | OXYGEN SATURATION: 98 % | BODY MASS INDEX: 31.29 KG/M2

## 2022-02-28 DIAGNOSIS — J45.30 MILD PERSISTENT ASTHMA WITHOUT COMPLICATION: ICD-10-CM

## 2022-02-28 DIAGNOSIS — E11.65 UNCONTROLLED TYPE 2 DIABETES MELLITUS WITH HYPERGLYCEMIA (HCC): Primary | ICD-10-CM

## 2022-02-28 DIAGNOSIS — E78.2 MIXED HYPERLIPIDEMIA: ICD-10-CM

## 2022-02-28 DIAGNOSIS — D50.8 IRON DEFICIENCY ANEMIA SECONDARY TO INADEQUATE DIETARY IRON INTAKE: ICD-10-CM

## 2022-02-28 DIAGNOSIS — N20.0 STAGHORN RENAL CALCULUS: ICD-10-CM

## 2022-02-28 DIAGNOSIS — I10 BENIGN ESSENTIAL HYPERTENSION: ICD-10-CM

## 2022-02-28 PROCEDURE — 99214 OFFICE O/P EST MOD 30 MIN: CPT | Performed by: INTERNAL MEDICINE

## 2022-02-28 RX ORDER — ATORVASTATIN CALCIUM 40 MG/1
20 TABLET, FILM COATED ORAL
Qty: 90 TABLET | Refills: 1
Start: 2022-02-28 | End: 2022-06-09 | Stop reason: SDUPTHER

## 2022-02-28 RX ORDER — FERROUS SULFATE TAB EC 324 MG (65 MG FE EQUIVALENT) 324 (65 FE) MG
324 TABLET DELAYED RESPONSE ORAL EVERY OTHER DAY
Start: 2022-02-28 | End: 2022-07-28 | Stop reason: SDUPTHER

## 2022-02-28 NOTE — H&P (VIEW-ONLY)
Assessment/Plan:    Uncontrolled type 2 diabetes mellitus with hyperglycemia ([facility] [address] )  She will be starting Ozempic and will reduce Lantus accordingly  She will continue the Jardiance and metformin  Lab Results   Component Value Date    HGBA1C 7 1 (H) 02/21/2022       Mild persistent asthma  She is on montelukast  She is not on any maintenance inhaler  "reactive airways"after extubation/anesthesia  She has discussed with her surgeon and will d/w her anesthesiologist    Benign essential hypertension  Continue lisinopril    Staghorn renal calculus  Medically stable for planned procedure  She has held her ASA  Advised to reduce Lantus to 10units the night prior  Also hold the metformin the evening prior  Resume usual Lantus and metformin once eating a regular diet      Hyperlipidemia  Myalgias on atorvastatin 40mg so will reduce to 20mg    Anemia  Iron def  Colonoscopy in 2019-due for repeat this year due to polyp  Start ferrous sulfate    May d/c B12 supplement       Problem List Items Addressed This Visit        Endocrine    Uncontrolled type 2 diabetes mellitus with hyperglycemia ([facility] [address] ) - Primary     She will be starting Ozempic and will reduce Lantus accordingly  She will continue the Jardiance and metformin  Lab Results   Component Value Date    HGBA1C 7 1 (H) 02/21/2022            Relevant Medications    Semaglutide,0 25 or 0 5MG/DOS, 2 MG/1 5ML SOPN    Other Relevant Orders    Hemoglobin A1C    Comprehensive metabolic panel    CBC and Platelet    Microalbumin / creatinine urine ratio    Lipid Panel with Direct LDL reflex       Respiratory    Mild persistent asthma     She is on montelukast  She is not on any maintenance inhaler  "reactive airways"after extubation/anesthesia  She has discussed with her surgeon and will d/w her anesthesiologist            Cardiovascular and Mediastinum    Benign essential hypertension     Continue lisinopril         Relevant Orders    Comprehensive metabolic panel    CBC and Platelet Genitourinary    Staghorn renal calculus     Medically stable for planned procedure  She has held her ASA  Advised to reduce Lantus to 10units the night prior  Also hold the metformin the evening prior  Resume usual Lantus and metformin once eating a regular diet              Other    Hyperlipidemia     Myalgias on atorvastatin 40mg so will reduce to 20mg         Relevant Medications    atorvastatin (LIPITOR) 40 mg tablet    Other Relevant Orders    Lipid Panel with Direct LDL reflex    Anemia     Iron def  Colonoscopy in 2019-due for repeat this year due to polyp  Start ferrous sulfate         Relevant Medications    ferrous sulfate 324 (65 Fe) mg    Other Relevant Orders    CBC and Platelet    Ferritin    Vitamin B12            Subjective:      Patient ID: Misti Cote is a 60 y o  female  HPI  Scheduled for IR nephroureteral access tomorrow prior to PCNL on 3/7 for left staghorn calculus  EKG on 2/22 normal  Blood work reviewed-A1C 7 1 LDL 64   She has held the baby ASA, vitamins in preparation for her surgery  Started Jardiance in November, atorvastatin raised to 40mg and since she has had myalgias   + weight gain  Concerned about her restrictive airways which flares after anesthesia/extubation after procedures  She has discussed this with her surgeon    The following portions of the patient's history were reviewed and updated as appropriate: allergies, current medications, past family history, past medical history, past social history, past surgical history and problem list     Review of Systems   Constitutional: Negative for chills and fever  Respiratory: Negative for cough and shortness of breath  Cardiovascular: Negative for chest pain, palpitations and leg swelling  Gastrointestinal: Negative for abdominal pain, blood in stool, constipation and diarrhea  Genitourinary: Negative for difficulty urinating and hematuria  Musculoskeletal: Positive for myalgias     Neurological: Negative for dizziness and headaches  Objective:      /72   Pulse 102   Ht 5' 3 15" (1 604 m)   Wt 80 1 kg (176 lb 9 6 oz)   SpO2 98%   BMI 31 13 kg/m²          Physical Exam  Constitutional:       General: She is not in acute distress  Appearance: She is well-developed  She is obese  She is not ill-appearing, toxic-appearing or diaphoretic  HENT:      Head: Normocephalic and atraumatic  Eyes:      Conjunctiva/sclera: Conjunctivae normal    Cardiovascular:      Rate and Rhythm: Normal rate and regular rhythm  Pulses: no weak pulses          Dorsalis pedis pulses are 2+ on the right side and 2+ on the left side  Heart sounds: Normal heart sounds  No murmur heard  Pulmonary:      Effort: Pulmonary effort is normal  No respiratory distress  Breath sounds: Normal breath sounds  No wheezing or rales  Abdominal:      General: There is no distension  Palpations: Abdomen is soft  There is no mass  Tenderness: There is no abdominal tenderness  There is no guarding or rebound  Musculoskeletal:      Cervical back: Neck supple  Right lower leg: No edema  Left lower leg: No edema  Feet:      Right foot:      Skin integrity: No ulcer, skin breakdown, erythema, warmth, callus or dry skin  Left foot:      Skin integrity: No ulcer, skin breakdown, erythema, warmth, callus or dry skin  Skin:     General: Skin is warm and dry  Neurological:      Mental Status: She is alert and oriented to person, place, and time  Psychiatric:         Mood and Affect: Mood normal          Behavior: Behavior normal          Thought Content: Thought content normal          Judgment: Judgment normal        Patient's shoes and socks removed  Right Foot/Ankle   Right Foot Inspection  Skin Exam: skin normal and skin intact  No dry skin, no warmth, no callus, no erythema, no maceration, no abnormal color, no pre-ulcer, no ulcer and no callus       Sensory   Monofilament testing: intact    Vascular  The right DP pulse is 2+  Left Foot/Ankle  Left Foot Inspection  Skin Exam: skin normal and skin intact  No dry skin, no warmth, no erythema, no maceration, normal color, no pre-ulcer, no ulcer and no callus  Sensory   Monofilament testing: intact    Vascular  The left DP pulse is 2+       Assign Risk Category  No deformity present  No loss of protective sensation  No weak pulses  Risk: 0

## 2022-02-28 NOTE — PROGRESS NOTES
Sadie Pharmacist    Reason for visit: Appointment with 60y o  year old for management of T2DM  Seen immediately after PCP appointment     Current DM Regimen:   Metformin    Lantus   Jardiance    ASSESSMENT/PLAN                                                                                     1  Type 2 diabetes: goal A1c <6 5% based on AACE/ACE guidelines  Most recent A1c above goal but significantly improved  FBG readings above goal but improved; no hypoglycemia  Would benefit from GLP1 to assist with weight loss    BMP acceptable; b12 within normal limits while on metformin  Duration: < 10 years (dx 2013)  Microvascular complications: microalbuminuria  Macrovascular complications: none  (Yes ) Aspirin (Yes) Statin (Yes) ACEI/ARB  Eye Exam:  11/2020 - [facility] [id number], Dr Magdaleno German, msg sent to [facility] previously  Lab Results   Component Value Date    LEFTDIABRET None 01/09/2019    RIGHTDIABRET None 01/09/2019   Foot Exam: 9/2020     Most recent labs and diabetes goals discussed with patient in clinic today   MEDICATIONS: If PCP is in agreeance, start Ozempic titration; reduce Lantus to 15 units daily when she starts Ozempic; continue metformin and Jardiance   SMBG: at least once daily    TLCs: Re-enforced the importance of a Diabetic Diet, exercise 30 minutes 5 days a week as tolerated, weight loss/control; discussion on cho amounts in low carb vs regular ice cream      2  Medication Reconciliation: Medication list reviewed with pt at today's visit  Discrepancies as discussed below     Medication list updated to reflect medications pt is currently taking    Follow-Up: 4-6 weeks  _x_ Home Monitoring Records, BG      SUBJECTIVE                                                                                                              1  Medication Adherence: Medication list reviewed with patient, reports the following discrepancies/problems:   Lantus: 20 units daily   MetFORMIN: taking as prescribed   Jardiance: continues to take daily     Misses doses:  no    2  Medication Efficacy:    Review of Systems   Constitutional: Positive for unexpected weight change  Negative for activity change and appetite change  Gastrointestinal: Negative for constipation, diarrhea, nausea and vomiting  Has noticed slight increase in bloating/weight gain     SMBG readings (no log, per memory): checking once daily  FB average but was 168 this am    SMBG:   Glucometer: Yes, Brand: One Touch Ultra Mini    Hypoglycemia history: 0 SMBG readings < 70mg/dL since last appt   Unsymptomatic hypoglycemia: No, reports hypoglycemia s/sx x0 since last appt    3  Lifestyle: trying to reduce cho intake - tried eating carb smart ice cream instead of regular ice cream but inquires if that is a better option to reduce cho intake         OBJECTIVE                                                                                                      Pertinent Lab Data:   Patient was fasting for most recent labs  Lab Results   Component Value Date    SODIUM 137 2022    K 4 2 2022    EGFR 73 2022    CREATININE 0 87 2022    GLUF 108 (H) 2022    LFNWWLOK82 2,447 (H) 2022    MICROALBCRE 1,006 (H) 2021       Lab Results   Component Value Date    HGBA1C 7 1 (H) 2022    HGBA1C 8 3 (H) 2021    HGBA1C 13 2 (H) 2021     Reason For Outreach  Embedded Pharmacist    Demographics  Interaction Method: Face to Face  Type of Intervention: Follow-Up    Topic(s) Addressed  Diabetes    Intervention(s) Made    Pharmacologic:    -- Medication Initiation: Ozempic  -- Medication Adjustment - Dose or Frequency: Lantus    Non-Pharmacologic:    -- Disease state education  -- Lifestyle education    Tool(s) Used  Not Applicable    Time Spent:   Time Spent in Direct Patient Care: 30 minutes  Time Spent in Care Coordination: 20 minutes    Recommendations  Recipient: Patient/caregiver  Outcome:  All Accepted

## 2022-02-28 NOTE — PATIENT INSTRUCTIONS
Ozempic: start 0 25mg once weekly after your surgery    Lantus: continue taking 20 units daily, reduce dose to 15 units daily when you start Ozempic    Jardiance: continue 25mg daily

## 2022-02-28 NOTE — PATIENT INSTRUCTIONS
Only administer 10 units of Lantus the night before   Skip the metformin the night before the procedure  Reduce atorvastatin to 20mg a day  Stop B12  Start ferrous sulfate 65mg elemental iron every other day

## 2022-02-28 NOTE — PROGRESS NOTES
Assessment/Plan:    Uncontrolled type 2 diabetes mellitus with hyperglycemia ([facility] [address] )  She will be starting Ozempic and will reduce Lantus accordingly  She will continue the Jardiance and metformin  Lab Results   Component Value Date    HGBA1C 7 1 (H) 02/21/2022       Mild persistent asthma  She is on montelukast  She is not on any maintenance inhaler  "reactive airways"after extubation/anesthesia  She has discussed with her surgeon and will d/w her anesthesiologist    Benign essential hypertension  Continue lisinopril    Staghorn renal calculus  Medically stable for planned procedure  She has held her ASA  Advised to reduce Lantus to 10units the night prior  Also hold the metformin the evening prior  Resume usual Lantus and metformin once eating a regular diet      Hyperlipidemia  Myalgias on atorvastatin 40mg so will reduce to 20mg    Anemia  Iron def  Colonoscopy in 2019-due for repeat this year due to polyp  Start ferrous sulfate    May d/c B12 supplement       Problem List Items Addressed This Visit        Endocrine    Uncontrolled type 2 diabetes mellitus with hyperglycemia ([facility] [address] ) - Primary     She will be starting Ozempic and will reduce Lantus accordingly  She will continue the Jardiance and metformin  Lab Results   Component Value Date    HGBA1C 7 1 (H) 02/21/2022            Relevant Medications    Semaglutide,0 25 or 0 5MG/DOS, 2 MG/1 5ML SOPN    Other Relevant Orders    Hemoglobin A1C    Comprehensive metabolic panel    CBC and Platelet    Microalbumin / creatinine urine ratio    Lipid Panel with Direct LDL reflex       Respiratory    Mild persistent asthma     She is on montelukast  She is not on any maintenance inhaler  "reactive airways"after extubation/anesthesia  She has discussed with her surgeon and will d/w her anesthesiologist            Cardiovascular and Mediastinum    Benign essential hypertension     Continue lisinopril         Relevant Orders    Comprehensive metabolic panel    CBC and Platelet Genitourinary    Staghorn renal calculus     Medically stable for planned procedure  She has held her ASA  Advised to reduce Lantus to 10units the night prior  Also hold the metformin the evening prior  Resume usual Lantus and metformin once eating a regular diet              Other    Hyperlipidemia     Myalgias on atorvastatin 40mg so will reduce to 20mg         Relevant Medications    atorvastatin (LIPITOR) 40 mg tablet    Other Relevant Orders    Lipid Panel with Direct LDL reflex    Anemia     Iron def  Colonoscopy in 2019-due for repeat this year due to polyp  Start ferrous sulfate         Relevant Medications    ferrous sulfate 324 (65 Fe) mg    Other Relevant Orders    CBC and Platelet    Ferritin    Vitamin B12            Subjective:      Patient ID: Austin Salgado is a 60 y o  female  HPI  Scheduled for IR nephroureteral access tomorrow prior to PCNL on 3/7 for left staghorn calculus  EKG on 2/22 normal  Blood work reviewed-A1C 7 1 LDL 64   She has held the baby ASA, vitamins in preparation for her surgery  Started Jardiance in November, atorvastatin raised to 40mg and since she has had myalgias   + weight gain  Concerned about her restrictive airways which flares after anesthesia/extubation after procedures  She has discussed this with her surgeon    The following portions of the patient's history were reviewed and updated as appropriate: allergies, current medications, past family history, past medical history, past social history, past surgical history and problem list     Review of Systems   Constitutional: Negative for chills and fever  Respiratory: Negative for cough and shortness of breath  Cardiovascular: Negative for chest pain, palpitations and leg swelling  Gastrointestinal: Negative for abdominal pain, blood in stool, constipation and diarrhea  Genitourinary: Negative for difficulty urinating and hematuria  Musculoskeletal: Positive for myalgias     Neurological: Negative for dizziness and headaches  Objective:      /72   Pulse 102   Ht 5' 3 15" (1 604 m)   Wt 80 1 kg (176 lb 9 6 oz)   SpO2 98%   BMI 31 13 kg/m²          Physical Exam  Constitutional:       General: She is not in acute distress  Appearance: She is well-developed  She is obese  She is not ill-appearing, toxic-appearing or diaphoretic  HENT:      Head: Normocephalic and atraumatic  Eyes:      Conjunctiva/sclera: Conjunctivae normal    Cardiovascular:      Rate and Rhythm: Normal rate and regular rhythm  Pulses: no weak pulses          Dorsalis pedis pulses are 2+ on the right side and 2+ on the left side  Heart sounds: Normal heart sounds  No murmur heard  Pulmonary:      Effort: Pulmonary effort is normal  No respiratory distress  Breath sounds: Normal breath sounds  No wheezing or rales  Abdominal:      General: There is no distension  Palpations: Abdomen is soft  There is no mass  Tenderness: There is no abdominal tenderness  There is no guarding or rebound  Musculoskeletal:      Cervical back: Neck supple  Right lower leg: No edema  Left lower leg: No edema  Feet:      Right foot:      Skin integrity: No ulcer, skin breakdown, erythema, warmth, callus or dry skin  Left foot:      Skin integrity: No ulcer, skin breakdown, erythema, warmth, callus or dry skin  Skin:     General: Skin is warm and dry  Neurological:      Mental Status: She is alert and oriented to person, place, and time  Psychiatric:         Mood and Affect: Mood normal          Behavior: Behavior normal          Thought Content: Thought content normal          Judgment: Judgment normal        Patient's shoes and socks removed  Right Foot/Ankle   Right Foot Inspection  Skin Exam: skin normal and skin intact  No dry skin, no warmth, no callus, no erythema, no maceration, no abnormal color, no pre-ulcer, no ulcer and no callus       Sensory   Monofilament testing: intact    Vascular  The right DP pulse is 2+  Left Foot/Ankle  Left Foot Inspection  Skin Exam: skin normal and skin intact  No dry skin, no warmth, no erythema, no maceration, normal color, no pre-ulcer, no ulcer and no callus  Sensory   Monofilament testing: intact    Vascular  The left DP pulse is 2+       Assign Risk Category  No deformity present  No loss of protective sensation  No weak pulses  Risk: 0

## 2022-03-01 ENCOUNTER — ANESTHESIA (OUTPATIENT)
Dept: RADIOLOGY | Facility: HOSPITAL | Age: 57
End: 2022-03-01

## 2022-03-01 ENCOUNTER — HOSPITAL ENCOUNTER (OUTPATIENT)
Dept: RADIOLOGY | Facility: HOSPITAL | Age: 57
Discharge: HOME | End: 2022-03-01
Attending: RADIOLOGY | Admitting: INTERNAL MEDICINE
Payer: MEDICARE

## 2022-03-01 ENCOUNTER — ANESTHESIA EVENT (OUTPATIENT)
Dept: RADIOLOGY | Facility: HOSPITAL | Age: 57
End: 2022-03-01

## 2022-03-01 VITALS
DIASTOLIC BLOOD PRESSURE: 67 MMHG | RESPIRATION RATE: 16 BRPM | HEART RATE: 88 BPM | OXYGEN SATURATION: 97 % | SYSTOLIC BLOOD PRESSURE: 113 MMHG | TEMPERATURE: 97.5 F

## 2022-03-01 DIAGNOSIS — N20.0 KIDNEY STONE: ICD-10-CM

## 2022-03-01 DIAGNOSIS — N20.0 STAGHORN RENAL CALCULUS: Primary | ICD-10-CM

## 2022-03-01 LAB
ABO GROUP BLD: NORMAL
BLD GP AB SCN SERPL QL: NEGATIVE
ERYTHROCYTE [DISTWIDTH] IN BLOOD BY AUTOMATED COUNT: 13.7 % (ref 11.6–15.1)
GLUCOSE SERPL-MCNC: 145 MG/DL (ref 65–140)
HCT VFR BLD AUTO: 36.9 % (ref 34.8–46.1)
HGB BLD-MCNC: 11.4 G/DL (ref 11.5–15.4)
INR PPP: 0.81 (ref 0.84–1.19)
MCH RBC QN AUTO: 26.3 PG (ref 26.8–34.3)
MCHC RBC AUTO-ENTMCNC: 30.9 G/DL (ref 31.4–37.4)
MCV RBC AUTO: 85 FL (ref 82–98)
PLATELET # BLD AUTO: 398 THOUSANDS/UL (ref 149–390)
PMV BLD AUTO: 9.1 FL (ref 8.9–12.7)
PROTHROMBIN TIME: 11.3 SECONDS (ref 11.6–14.5)
RBC # BLD AUTO: 4.33 MILLION/UL (ref 3.81–5.12)
RH BLD: POSITIVE
SPECIMEN EXPIRATION DATE: NORMAL
WBC # BLD AUTO: 9.7 THOUSAND/UL (ref 4.31–10.16)

## 2022-03-01 PROCEDURE — 50433 PLMT NEPHROURETERAL CATHETER: CPT

## 2022-03-01 PROCEDURE — 85610 PROTHROMBIN TIME: CPT | Performed by: RADIOLOGY

## 2022-03-01 PROCEDURE — 82948 REAGENT STRIP/BLOOD GLUCOSE: CPT

## 2022-03-01 PROCEDURE — C1894 INTRO/SHEATH, NON-LASER: HCPCS

## 2022-03-01 PROCEDURE — 87205 SMEAR GRAM STAIN: CPT | Performed by: UROLOGY

## 2022-03-01 PROCEDURE — 85027 COMPLETE CBC AUTOMATED: CPT | Performed by: RADIOLOGY

## 2022-03-01 PROCEDURE — 86901 BLOOD TYPING SEROLOGIC RH(D): CPT | Performed by: RADIOLOGY

## 2022-03-01 PROCEDURE — 76937 US GUIDE VASCULAR ACCESS: CPT

## 2022-03-01 PROCEDURE — C1769 GUIDE WIRE: HCPCS

## 2022-03-01 PROCEDURE — 87070 CULTURE OTHR SPECIMN AEROBIC: CPT | Performed by: UROLOGY

## 2022-03-01 PROCEDURE — 86900 BLOOD TYPING SEROLOGIC ABO: CPT | Performed by: RADIOLOGY

## 2022-03-01 PROCEDURE — 86850 RBC ANTIBODY SCREEN: CPT | Performed by: RADIOLOGY

## 2022-03-01 PROCEDURE — C2617 STENT, NON-COR, TEM W/O DEL: HCPCS

## 2022-03-01 PROCEDURE — 50433 PLMT NEPHROURETERAL CATHETER: CPT | Performed by: INTERNAL MEDICINE

## 2022-03-01 RX ORDER — SODIUM CHLORIDE 9 MG/ML
10 INJECTION INTRAVENOUS DAILY
Qty: 300 ML | Refills: 2 | Status: SHIPPED | OUTPATIENT
Start: 2022-03-01 | End: 2022-05-30

## 2022-03-01 RX ORDER — CEFAZOLIN SODIUM 1 G/50ML
1000 SOLUTION INTRAVENOUS ONCE
Status: COMPLETED | OUTPATIENT
Start: 2022-03-01 | End: 2022-03-01

## 2022-03-01 RX ORDER — METOCLOPRAMIDE HYDROCHLORIDE 5 MG/ML
10 INJECTION INTRAMUSCULAR; INTRAVENOUS ONCE AS NEEDED
Status: DISCONTINUED | OUTPATIENT
Start: 2022-03-01 | End: 2022-03-01 | Stop reason: HOSPADM

## 2022-03-01 RX ORDER — GLYCOPYRROLATE 0.2 MG/ML
INJECTION INTRAMUSCULAR; INTRAVENOUS AS NEEDED
Status: DISCONTINUED | OUTPATIENT
Start: 2022-03-01 | End: 2022-03-01

## 2022-03-01 RX ORDER — ONDANSETRON 2 MG/ML
INJECTION INTRAMUSCULAR; INTRAVENOUS AS NEEDED
Status: DISCONTINUED | OUTPATIENT
Start: 2022-03-01 | End: 2022-03-01

## 2022-03-01 RX ORDER — FENTANYL CITRATE/PF 50 MCG/ML
25 SYRINGE (ML) INJECTION
Status: DISCONTINUED | OUTPATIENT
Start: 2022-03-01 | End: 2022-03-01 | Stop reason: HOSPADM

## 2022-03-01 RX ORDER — MIDAZOLAM HYDROCHLORIDE 2 MG/2ML
INJECTION, SOLUTION INTRAMUSCULAR; INTRAVENOUS AS NEEDED
Status: DISCONTINUED | OUTPATIENT
Start: 2022-03-01 | End: 2022-03-01

## 2022-03-01 RX ORDER — LIDOCAINE HYDROCHLORIDE 10 MG/ML
INJECTION, SOLUTION EPIDURAL; INFILTRATION; INTRACAUDAL; PERINEURAL AS NEEDED
Status: DISCONTINUED | OUTPATIENT
Start: 2022-03-01 | End: 2022-03-01

## 2022-03-01 RX ORDER — DEXAMETHASONE SODIUM PHOSPHATE 10 MG/ML
INJECTION, SOLUTION INTRAMUSCULAR; INTRAVENOUS AS NEEDED
Status: DISCONTINUED | OUTPATIENT
Start: 2022-03-01 | End: 2022-03-01

## 2022-03-01 RX ORDER — IBUPROFEN 600 MG/1
TABLET ORAL
Status: COMPLETED
Start: 2022-03-01 | End: 2022-03-01

## 2022-03-01 RX ORDER — KETAMINE HCL IN NACL, ISO-OSM 100MG/10ML
SYRINGE (ML) INJECTION AS NEEDED
Status: DISCONTINUED | OUTPATIENT
Start: 2022-03-01 | End: 2022-03-01

## 2022-03-01 RX ORDER — IBUPROFEN 600 MG/1
600 TABLET ORAL ONCE
Status: COMPLETED | OUTPATIENT
Start: 2022-03-01 | End: 2022-03-01

## 2022-03-01 RX ORDER — SUCCINYLCHOLINE/SOD CL,ISO/PF 100 MG/5ML
SYRINGE (ML) INTRAVENOUS AS NEEDED
Status: DISCONTINUED | OUTPATIENT
Start: 2022-03-01 | End: 2022-03-01

## 2022-03-01 RX ORDER — ALBUTEROL SULFATE 2.5 MG/3ML
2.5 SOLUTION RESPIRATORY (INHALATION) ONCE AS NEEDED
Status: DISCONTINUED | OUTPATIENT
Start: 2022-03-01 | End: 2022-03-01 | Stop reason: HOSPADM

## 2022-03-01 RX ORDER — FENTANYL CITRATE 50 UG/ML
INJECTION, SOLUTION INTRAMUSCULAR; INTRAVENOUS AS NEEDED
Status: DISCONTINUED | OUTPATIENT
Start: 2022-03-01 | End: 2022-03-01

## 2022-03-01 RX ORDER — ONDANSETRON 2 MG/ML
4 INJECTION INTRAMUSCULAR; INTRAVENOUS ONCE AS NEEDED
Status: DISCONTINUED | OUTPATIENT
Start: 2022-03-01 | End: 2022-03-01 | Stop reason: HOSPADM

## 2022-03-01 RX ORDER — MAGNESIUM SULFATE HEPTAHYDRATE 40 MG/ML
INJECTION, SOLUTION INTRAVENOUS AS NEEDED
Status: DISCONTINUED | OUTPATIENT
Start: 2022-03-01 | End: 2022-03-01

## 2022-03-01 RX ORDER — PROPOFOL 10 MG/ML
INJECTION, EMULSION INTRAVENOUS AS NEEDED
Status: DISCONTINUED | OUTPATIENT
Start: 2022-03-01 | End: 2022-03-01

## 2022-03-01 RX ORDER — SODIUM CHLORIDE 9 MG/ML
75 INJECTION, SOLUTION INTRAVENOUS CONTINUOUS
Status: DISCONTINUED | OUTPATIENT
Start: 2022-03-01 | End: 2022-03-02 | Stop reason: HOSPADM

## 2022-03-01 RX ADMIN — SODIUM CHLORIDE: 0.9 INJECTION, SOLUTION INTRAVENOUS at 11:16

## 2022-03-01 RX ADMIN — FENTANYL CITRATE 50 MCG: 50 INJECTION, SOLUTION INTRAMUSCULAR; INTRAVENOUS at 10:06

## 2022-03-01 RX ADMIN — IOHEXOL 15 ML: 350 INJECTION, SOLUTION INTRAVENOUS at 11:03

## 2022-03-01 RX ADMIN — ONDANSETRON 4 MG: 2 INJECTION INTRAMUSCULAR; INTRAVENOUS at 11:00

## 2022-03-01 RX ADMIN — MIDAZOLAM HYDROCHLORIDE 2 MG: 1 INJECTION, SOLUTION INTRAMUSCULAR; INTRAVENOUS at 10:03

## 2022-03-01 RX ADMIN — MAGNESIUM SULFATE IN WATER 2 G: 40 INJECTION, SOLUTION INTRAVENOUS at 10:08

## 2022-03-01 RX ADMIN — Medication 100 MG: at 10:06

## 2022-03-01 RX ADMIN — CEFAZOLIN SODIUM 1000 MG: 1 SOLUTION INTRAVENOUS at 09:39

## 2022-03-01 RX ADMIN — PROPOFOL 200 MG: 10 INJECTION, EMULSION INTRAVENOUS at 10:06

## 2022-03-01 RX ADMIN — Medication 30 MG: at 10:06

## 2022-03-01 RX ADMIN — LIDOCAINE HYDROCHLORIDE 30 MG: 10 INJECTION, SOLUTION EPIDURAL; INFILTRATION; INTRACAUDAL; PERINEURAL at 10:06

## 2022-03-01 RX ADMIN — IBUPROFEN 600 MG: 600 TABLET ORAL at 12:55

## 2022-03-01 RX ADMIN — PHENYLEPHRINE HYDROCHLORIDE 50 MCG/MIN: 10 INJECTION INTRAVENOUS at 10:19

## 2022-03-01 RX ADMIN — GLYCOPYRROLATE 0.2 MG: 0.2 INJECTION, SOLUTION INTRAMUSCULAR; INTRAVENOUS at 10:06

## 2022-03-01 RX ADMIN — DEXAMETHASONE SODIUM PHOSPHATE 8 MG: 10 INJECTION, SOLUTION INTRAMUSCULAR; INTRAVENOUS at 10:08

## 2022-03-01 RX ADMIN — FENTANYL CITRATE 25 MCG: 50 INJECTION, SOLUTION INTRAMUSCULAR; INTRAVENOUS at 10:58

## 2022-03-01 RX ADMIN — SODIUM CHLORIDE 75 ML/HR: 0.9 INJECTION, SOLUTION INTRAVENOUS at 09:38

## 2022-03-01 NOTE — DISCHARGE INSTRUCTIONS
Nephrostomy Tube Care     WHAT YOU NEED TO KNOW:   A nephrostomy tube is a catheter (thin plastic tube) that is inserted through your skin and into your kidney  The nephrostomy tube drains urine from your kidney into a collecting bag outside your body  You may need a nephrostomy tube when something is blocking the normal flow of urine  A nephrostomy tube may be used for a short or a long period of time  The nephrostomy tube comes out of your back, so you will need someone to help care for your nephrostomy tube  DISCHARGE INSTRUCTIONS:      How to clean the skin around the nephrostomy tube and change the bandage:  Since the nephrostomy tube comes out of your back, you will not be able to care for it by yourself  Ask someone to follow the general directions below to check and care for your nephrostomy tube  Gather the items you will need  Disposable (single use) under-pad, and a clean washcloth  ¨ Plain soap, warm water, and new medical gloves  ¨ Sterile gauze bandages  ¨ Clear adhesive dressing or medical tape  ¨ Skin barrier  ¨ Protective skin film  ¨ Trash bag  · Remove the old bandage, and check the tube entry site  ¨ Have the patient lie on his side with the nephrostomy tube entry site facing up  Place the under-pad where it will catch drainage as you are working with the nephrostomy tube  ¨ Wash your hands with soap and water  Put on new medical gloves  ¨ Gently remove the old bandage, without pulling on the tube  Do this by holding the skin beside the tube with one hand  With the other hand, gently remove sticky tape and the skin barrier by pulling in the same direction as hair growth  Do not touch the side of the bandage that is placed over or around the tube  Throw the bandage and skin barrier away in a trash bag  ¨ Look for signs of infection, such as skin redness and swelling  Report any skin changes to healthcare providers  ¨ Clean the tube entry site      ¨ Hold the tube in place to keep it from being pulled out while you are cleaning around it  ¨ You will need to clean the area twice  For the first cleaning, wet a new gauze bandage with soap and water  Begin at the entry site of the tube  Wipe the skin in circles, moving away from the entry site  Remove blood and any other material with the gauze  Do this as often as needed  Use a new gauze bandage each time you clean the area, moving away from the entry site  ¨ For the second cleaning, wet a new gauze bandage with water  Begin at the entry site of the tube  Wipe the skin in circles, moving away from the entry site  Use a new gauze bandage each time you clean the area, moving away from the entry site  ¨ Gently pat the skin with a clean washcloth to dry it  · Apply the skin barrier and bandages  ¨ Roll up a bandage to make it thick, and place it under  the place where the tube enters the skin  Place it to support the tube, and stop it from kinking or bending  Tape the bandage in place, and apply more bandages if directed by a healthcare provider  ¨ Bring the tubing forward to the front and tape it to the skin  Do not stretch the tube tight, because this may pull the nephrostomy tube out  How often to change the bandage  Change the bandage around the tube, every other day  If your bandages  get dirty or wet, change them right away, and as often as needed  If your nephrostomy tube is to be used for a long period of time, the tube needs to be changed every 2 to 3 months  Healthcare providers will tell you when you need to make an appointment to have your tube changed  How to care for the urine drainage bag:   · Ask if you need to measure and write down how much urine is in the bag before you empty it  Drain urine out of the drainage bag when it is ½ to ? full  Open the spout at the bottom of the bag to empty the urine into the toilet  · You may need to detach the drainage bag from the nephrostomy tube to change it    If so, attach a new drainage bag tightly to the nephrostomy tube  ·   How to prevent problems with your nephrostomy tube:   · Change bandages, directed  This helps to prevent infection  Throw away or clean your drainage bag as directed by your healthcare provider  · Wipe the connecting ends of the drainage bag with alcohol before you reconnect the bag to the tube  This helps prevent infection  Keep the tube taped to your skin and connected to a drainage bag placed below the level of your kidneys  This helps prevent urine from backing up into your kidneys  You may wear a small drainage bag strapped to your leg to let you move around more easily  · Check the catheter to be sure it is in place after you change your clothes or do other activities  Do not wear tight clothing over the tube  Place the tubing over your thigh rather than under it when you are sitting down  Be sure that nothing is pulling on the nephrostomy tube when you move around  · Change positions if you see little or no urine in your drainage bag  Check to see if the urine tube is twisted or bent  Be sure that you are not sitting or lying on the tube  If there are no kinks and there is little or no urine in the drainage bag, tell your healthcare provider  · Flush out the tube as directed  Some tubes get flushed one time a day with 10 mls of NSS You will be given a prescription for the flushes  To flush the nephrostomy tube, clean both connections with alcohol swap  Twist off the drainage bag tube and twist the saline syringe into the nephrostomy tube and flush briskly  Remove the syringe and twist the drainage bag tube back into the nephrostomy tube  · Keep the site covered while you shower  Tape a piece of clear adhesive plastic over the dressing to keep it dry while you shower  Do not take tub baths      Contact Interventional Radiology at [phone] [facility] PATIENTS: Contact Interventional Radiology at [phone]) [facility] PATIENTS: Contact Interventional Radiology at [phone]) if:  · The skin around the nephrostomy tube is red, swollen, itches, or has a rash  · You have a fever greater than 101 or chills  · You have lower back or hip pain  · There are changes in how your urine looks or smells  · You have little or no urine draining from the nephrostomy tube  · You have nausea and are vomiting  · The black otto on your tube has moved, or the tube is longer than when it was put in    · You have questions or concerns about your condition or care  · The nephrostomy tube comes out completely  · There is blood, pus, or a bad smell coming from the place where the tube enters your skin  · Urine is leaking around the tube  The following pharmacies carry the flush syringes  [facility]                     [facility]       [address] [facility]                    [id number] [place] [address]  Phone [phone]            Phone [phone]  [address] & [facility]                      [address]                                 [phone]  Phone [phone]            Phone [phone]    [facility]                                                                         [facility] [phone]  [facility] [place]   Phone [phone]

## 2022-03-01 NOTE — BRIEF OP NOTE (RAD/CATH)
INTERVENTIONAL RADIOLOGY PROCEDURE NOTE    Date: 3/1/2022    Procedure: IR NEPHROURETERAL ACCESS FOR UROLOGY PCNL    Preoperative diagnosis:   1  Kidney stone         Postoperative diagnosis: Same  Surgeon: Db Pop MD     Assistant: None  No qualified resident was available  Blood loss: None    Specimens: Urine specimen collected from access     Findings: Placement of a left-sided 8 5 Danish x 24 cm PCNU catheter via a upper pole posterior calyx access  Complications: None immediate      Anesthesia: general     Recommendations:  - Maintain catheter to bag drainage  - Follow-up with urology for procedure

## 2022-03-01 NOTE — ANESTHESIA POSTPROCEDURE EVALUATION
Post-Op Assessment Note    CV Status:  Stable  Pain Score: 0    Pain management: adequate     Mental Status:  Alert and awake   Hydration Status:  Euvolemic and stable   PONV Controlled:  Controlled   Airway Patency:  Patent and adequate      Post Op Vitals Reviewed: Yes      Staff: CRNA         No complications documented      BP (P) 133/95 (03/01/22 1120)    Temp (!) (P) 97 3 °F (36 3 °C) (03/01/22 1120)    Pulse (P) 98 (03/01/22 1120)   Resp (P) 18 (03/01/22 1120)    SpO2 (P) 99 % (03/01/22 1120)

## 2022-03-01 NOTE — ASSESSMENT & PLAN NOTE
Medically stable for planned procedure  She has held her ASA  Advised to reduce Lantus to 10units the night prior  Also hold the metformin the evening prior  Resume usual Lantus and metformin once eating a regular diet

## 2022-03-01 NOTE — ANESTHESIA PREPROCEDURE EVALUATION
Procedure:  IR NEPHROURETERAL ACCESS FOR UROLOGY PCNL    Relevant Problems   CARDIO   (+) Benign essential hypertension   (+) Hyperlipidemia      /RENAL   (+) Kidney stone   (+) Staghorn renal calculus      HEMATOLOGY   (+) Anemia      PULMONARY   (+) Mild persistent asthma      Other   (+) Restrictive airway disease   (+) Uncontrolled type 2 diabetes mellitus with hyperglycemia (HCC)      NPO for procedure  Good functional capacity  Hx of reactive airways likely bronchospasm with airway instrumentation and extubation and asthma attacks in the PACU  Discussed precautions that can be taken to minimize risk of bronchospasm including albuterol, ketamine, magnesium, and lidocaine LTA  Physical Exam    Airway    Mallampati score: III  TM Distance: >3 FB  Neck ROM: full     Dental   No notable dental hx     Cardiovascular  Rhythm: regular, Rate: normal,     Pulmonary  Pulmonary exam normal     Other Findings        Anesthesia Plan  ASA Score- 2     Anesthesia Type- general with ASA Monitors  Additional Monitors:   Airway Plan: ETT  Plan Factors-Exercise tolerance (METS): >4 METS  Chart reviewed  EKG reviewed  Existing labs reviewed  Patient summary reviewed  Patient is not a current smoker  Obstructive sleep apnea risk education given perioperatively  Induction- intravenous  Postoperative Plan- Plan for postoperative opioid use  Informed Consent- Anesthetic plan and risks discussed with patient  I personally reviewed this patient with the CRNA  Discussed and agreed on the Anesthesia Plan with the CRNA  Sienna Snider

## 2022-03-01 NOTE — INTERVAL H&P NOTE
Update: (This section must be completed if the H&P was completed greater than 24 hrs to procedure or admission)    H&P reviewed  After examining the patient, I find no changed to the H&P since it had been written  /69   Pulse 89   Temp 97 8 °F (36 6 °C) (Temporal)   Resp 18   SpO2 99%     Patient re-evaluated  Accept as history and physical     We will proceed with left-sided PCN vs PCNU catheter placement at the left kidney to facilitate future urological procedure for staghorn calculus  The benefits and risks of the procedure was described with the patient and verbal and written informed consent was obtained      Geryl Gaucher, MD/March 1, 2022/10:02 AM

## 2022-03-01 NOTE — ASSESSMENT & PLAN NOTE
She is on montelukast  She is not on any maintenance inhaler  "reactive airways"after extubation/anesthesia  She has discussed with her surgeon and will d/w her anesthesiologist

## 2022-03-01 NOTE — ASSESSMENT & PLAN NOTE
She will be starting Ozempic and will reduce Lantus accordingly  She will continue the Jardiance and metformin  Lab Results   Component Value Date    HGBA1C 7 1 (H) 02/21/2022

## 2022-03-02 ENCOUNTER — TELEPHONE (OUTPATIENT)
Dept: INTERNAL MEDICINE CLINIC | Facility: CLINIC | Age: 57
End: 2022-03-02

## 2022-03-02 ENCOUNTER — DOCUMENTATION (OUTPATIENT)
Dept: INTERNAL MEDICINE CLINIC | Facility: CLINIC | Age: 57
End: 2022-03-02

## 2022-03-02 NOTE — PROGRESS NOTES
Prior authorization filled out for Ozempic through [facility]  Waiting for determination

## 2022-03-03 ENCOUNTER — OFFICE VISIT (OUTPATIENT)
Dept: UROLOGY | Facility: CLINIC | Age: 57
End: 2022-03-03
Payer: MEDICARE

## 2022-03-03 VITALS
HEIGHT: 64 IN | DIASTOLIC BLOOD PRESSURE: 64 MMHG | BODY MASS INDEX: 29.71 KG/M2 | SYSTOLIC BLOOD PRESSURE: 116 MMHG | OXYGEN SATURATION: 98 % | WEIGHT: 174 LBS | HEART RATE: 87 BPM | RESPIRATION RATE: 16 BRPM

## 2022-03-03 DIAGNOSIS — N20.0 KIDNEY STONE: Primary | ICD-10-CM

## 2022-03-03 PROCEDURE — 99213 OFFICE O/P EST LOW 20 MIN: CPT | Performed by: PHYSICIAN ASSISTANT

## 2022-03-03 PROCEDURE — 87086 URINE CULTURE/COLONY COUNT: CPT | Performed by: PHYSICIAN ASSISTANT

## 2022-03-03 RX ORDER — DOCUSATE SODIUM 100 MG/1
100 CAPSULE, LIQUID FILLED ORAL 2 TIMES DAILY
Qty: 30 CAPSULE | Refills: 1 | Status: SHIPPED | OUTPATIENT
Start: 2022-03-03 | End: 2022-06-09

## 2022-03-03 RX ORDER — HYDROCODONE BITARTRATE AND ACETAMINOPHEN 5; 325 MG/1; MG/1
1 TABLET ORAL EVERY 6 HOURS PRN
Qty: 10 TABLET | Refills: 0 | Status: SHIPPED | OUTPATIENT
Start: 2022-03-03 | End: 2022-04-19 | Stop reason: ALTCHOICE

## 2022-03-03 RX ORDER — OXYBUTYNIN CHLORIDE 5 MG/1
5 TABLET ORAL 3 TIMES DAILY PRN
Qty: 30 TABLET | Refills: 1 | Status: SHIPPED | OUTPATIENT
Start: 2022-03-03 | End: 2022-06-09

## 2022-03-03 NOTE — H&P (VIEW-ONLY)
1  Kidney stone  oxybutynin (DITROPAN) 5 mg tablet    HYDROcodone-acetaminophen (Norco) 5-325 mg per tablet    docusate sodium (COLACE) 100 mg capsule    Urine culture     Assessment and plan:       1  Staghorn left renal calculus  - s/p PCNU insertion  - scheduled PCNL 3/7/22 with Dr Flaco Tsang  - preop urine culture obtained off nephrostomy flush  - risks of the procedure reviewed  - oxybutynin and short course of Norco as needed for pain  - all questions answered      Conrad Nguyen PA-C      Chief Complaint     PNCL    History of Present Illness     Mary Marroquin is a 60 y o  female patient of Dr Flaco Tsang with a history of staghorn calculus presenting for follow-up  History of a left staghorn calculus  Elected for left PCNL  Left PCNU placed 3/1/22 with IR  Patient does have a history of restrictive airway disease  She states that her anesthesia from her recent PCNU placement was well tolerated  Patient is having some flank pain from her nephrostomy tube  Not voiding much volitionally on her own as the majority of her urine is refluxing out through her nephrostomy  Laboratory     Lab Results   Component Value Date    CREATININE 0 87 02/21/2022     Review of Systems     Review of Systems   Constitutional: Negative for activity change, appetite change, chills, diaphoresis, fatigue, fever and unexpected weight change  Respiratory: Negative for chest tightness and shortness of breath  Cardiovascular: Negative for chest pain, palpitations and leg swelling  Gastrointestinal: Negative for abdominal distention, abdominal pain, constipation, diarrhea, nausea and vomiting  Genitourinary: Positive for flank pain  Negative for decreased urine volume, difficulty urinating, dysuria, enuresis, frequency, genital sores, hematuria and urgency  Musculoskeletal: Negative for back pain, gait problem and myalgias  Skin: Negative for color change, pallor, rash and wound     Psychiatric/Behavioral: Negative for behavioral problems  The patient is not nervous/anxious  Allergies     Allergies   Allergen Reactions    Seasonal Ic  [Cholestatin] Allergic Rhinitis       Physical Exam     Physical Exam  Constitutional:       General: She is not in acute distress  Appearance: Normal appearance  She is normal weight  She is not ill-appearing, toxic-appearing or diaphoretic  HENT:      Head: Normocephalic and atraumatic  Eyes:      General:         Right eye: No discharge  Left eye: No discharge  Conjunctiva/sclera: Conjunctivae normal    Cardiovascular:      Rate and Rhythm: Normal rate and regular rhythm  Heart sounds: Normal heart sounds  No murmur heard  No friction rub  Pulmonary:      Effort: Pulmonary effort is normal  No respiratory distress  Breath sounds: Normal breath sounds  No stridor  No wheezing or rhonchi  Genitourinary:     Comments: Left nephrostomy in place draining clear yellow urine  Flushed well  Musculoskeletal:         General: No swelling or tenderness  Normal range of motion  Skin:     General: Skin is warm and dry  Coloration: Skin is not jaundiced or pale  Neurological:      General: No focal deficit present  Mental Status: She is alert and oriented to person, place, and time  Psychiatric:         Mood and Affect: Mood normal          Behavior: Behavior normal          Thought Content:  Thought content normal            Vital Signs     Vitals:    03/03/22 1014   BP: 116/64   Pulse: 87   Resp: 16   SpO2: 98%   Weight: 78 9 kg (174 lb)   Height: 5' 4" (1 626 m)         Current Medications       Current Outpatient Medications:     albuterol (ProAir HFA) 90 mcg/act inhaler, Inhale 1-2 puffs every 4 (four) hours as needed for wheezing, Disp: 18 g, Rfl: 3    aspirin 81 MG tablet, Take 1 tablet by mouth daily, Disp: , Rfl:     atorvastatin (LIPITOR) 40 mg tablet, Take 0 5 tablets (20 mg total) by mouth daily at bedtime, Disp: 90 tablet, Rfl: 1    cefdinir (OMNICEF) 300 mg capsule, Take 1 capsule (300 mg total) by mouth every 12 (twelve) hours for 3 days Start the day before stent removal so that you are finishing the day after stent removal, Disp: 24 capsule, Rfl: 0    cholecalciferol (VITAMIN D3) 1,000 units tablet, Take 1,000 Units by mouth daily  , Disp: , Rfl:     cyanocobalamin (VITAMIN B-12) 500 MCG tablet, Take 500 mcg by mouth daily, Disp: , Rfl:     docusate sodium (COLACE) 100 mg capsule, Take 1 capsule (100 mg total) by mouth 2 (two) times a day, Disp: 30 capsule, Rfl: 1    Empagliflozin 25 MG TABS, Take 1 tablet (25 mg total) by mouth every morning For diabetes, Disp: 90 tablet, Rfl: 1    ferrous sulfate 324 (65 Fe) mg, Take 1 tablet (324 mg total) by mouth every other day, Disp: , Rfl:     glucose blood (OneTouch Ultra) test strip, Use 1 each 3 (three) times a day, Disp: 300 each, Rfl: 3    HYDROcodone-acetaminophen (Norco) 5-325 mg per tablet, Take 1 tablet by mouth every 6 (six) hours as needed for pain Max Daily Amount: 4 tablets, Disp: 10 tablet, Rfl: 0    insulin glargine (Lantus SoloStar) 100 units/mL injection pen, Inject 20 Units under the skin daily at bedtime Inject 16 units daily, Disp: 30 mL, Rfl: 1    Insulin Pen Needle (Pen Needles) 32G X 4 MM MISC, Use daily at bedtime, Disp: 100 each, Rfl: 3    Lancets (OneTouch Delica Plus CPNRNE22A) MISC, Test TID, Disp: 300 each, Rfl: 3    lisinopril (ZESTRIL) 5 mg tablet, Take 1 tablet (5 mg total) by mouth daily, Disp: 90 tablet, Rfl: 3    metFORMIN (GLUCOPHAGE-XR) 500 mg 24 hr tablet, Take 2 tablets (1,000 mg total) by mouth 2 (two) times a day with meals, Disp: 360 tablet, Rfl: 3    montelukast (SINGULAIR) 10 mg tablet, Take 1 tablet (10 mg total) by mouth every evening, Disp: 90 tablet, Rfl: 3    oxybutynin (DITROPAN) 5 mg tablet, Take 1 tablet (5 mg total) by mouth 3 (three) times a day as needed (stent pain), Disp: 30 tablet, Rfl: 1    Semaglutide,0 25 or 0 5MG/DOS, 2 MG/1 5ML SOPN, Inject 0 25 mg under the skin every 7 days for 30 days, THEN 0 5 mg every 7 days  , Disp: 4 5 mL, Rfl: 0    sodium chloride, PF, 0 9 %, 10 mL by Intracatheter route daily Intracatheter flushing daily, Disp: 300 mL, Rfl: 2      Active Problems     Patient Active Problem List   Diagnosis    Benign essential hypertension    Hyperlipidemia    Uncontrolled type 2 diabetes mellitus with hyperglycemia (HCC)    Kidney stone    Mild persistent asthma    Allergic rhinitis    Anemia    Staghorn renal calculus    Incomplete emptying of bladder    Restrictive airway disease         Past Medical History     Past Medical History:   Diagnosis Date    Abnormal LFTs (liver function tests)     last assessed 14    Anemia     last assessed 14    Anesthesia complication     Restrictive airway diesease- has asthma attack when extubated    Asthma     Diabetes mellitus ([facility] )     Diabetes mellitus due to underlying condition with hyperglycemia, without long-term current use of insulin ([facility] ) 2020    Elevated blood pressure reading     last assessed 13    GERD (gastroesophageal reflux disease)     Hematuria     last assessed 14    Hypercalcemia     last assessed 14    Hyperlipidemia     Kidney stone     Leukocytosis     last assessed     Microalbuminuria     last assessed 14    Restrictive lung disease     Type 2 diabetes mellitus with hyperglycemia ([facility] ) 2013    Type 2 diabetes mellitus without complication, without long-term current use of insulin ([facility] ) 2020         Surgical History     Past Surgical History:   Procedure Laterality Date     SECTION      HYSTERECTOMY      age 37 per MRS    IR NEPHROURETERAL ACCESS FOR UROLOGY PCNL  3/1/2022    OOPHORECTOMY Bilateral     age 37 per MRS, with hysterectomy    TOTAL ABDOMINAL HYSTERECTOMY W/ BILATERAL SALPINGOOPHORECTOMY           Family History     Family History Problem Relation Age of Onset    Diabetes Father         borderline    Melanoma Father     No Known Problems Mother     No Known Problems Sister     No Known Problems Daughter     No Known Problems Maternal Grandmother     No Known Problems Maternal Grandfather     No Known Problems Paternal Grandmother     No Known Problems Paternal Grandfather     No Known Problems Maternal Aunt     No Known Problems Paternal Aunt          Social History     Social History       Radiology

## 2022-03-03 NOTE — PROGRESS NOTES
1  Kidney stone  oxybutynin (DITROPAN) 5 mg tablet    HYDROcodone-acetaminophen (Norco) 5-325 mg per tablet    docusate sodium (COLACE) 100 mg capsule    Urine culture     Assessment and plan:       1  Staghorn left renal calculus  - s/p PCNU insertion  - scheduled PCNL 3/7/22 with Dr Shaun Steven  - preop urine culture obtained off nephrostomy flush  - risks of the procedure reviewed  - oxybutynin and short course of Norco as needed for pain  - all questions answered      Odette Molina PA-C      Chief Complaint     PNCL    History of Present Illness     Daria Luciano is a 60 y o  female patient of Dr Shaun Steven with a history of staghorn calculus presenting for follow-up  History of a left staghorn calculus  Elected for left PCNL  Left PCNU placed 3/1/22 with IR  Patient does have a history of restrictive airway disease  She states that her anesthesia from her recent PCNU placement was well tolerated  Patient is having some flank pain from her nephrostomy tube  Not voiding much volitionally on her own as the majority of her urine is refluxing out through her nephrostomy  Laboratory     Lab Results   Component Value Date    CREATININE 0 87 02/21/2022     Review of Systems     Review of Systems   Constitutional: Negative for activity change, appetite change, chills, diaphoresis, fatigue, fever and unexpected weight change  Respiratory: Negative for chest tightness and shortness of breath  Cardiovascular: Negative for chest pain, palpitations and leg swelling  Gastrointestinal: Negative for abdominal distention, abdominal pain, constipation, diarrhea, nausea and vomiting  Genitourinary: Positive for flank pain  Negative for decreased urine volume, difficulty urinating, dysuria, enuresis, frequency, genital sores, hematuria and urgency  Musculoskeletal: Negative for back pain, gait problem and myalgias  Skin: Negative for color change, pallor, rash and wound     Psychiatric/Behavioral: Negative for behavioral problems  The patient is not nervous/anxious  Allergies     Allergies   Allergen Reactions    Seasonal Ic  [Cholestatin] Allergic Rhinitis       Physical Exam     Physical Exam  Constitutional:       General: She is not in acute distress  Appearance: Normal appearance  She is normal weight  She is not ill-appearing, toxic-appearing or diaphoretic  HENT:      Head: Normocephalic and atraumatic  Eyes:      General:         Right eye: No discharge  Left eye: No discharge  Conjunctiva/sclera: Conjunctivae normal    Cardiovascular:      Rate and Rhythm: Normal rate and regular rhythm  Heart sounds: Normal heart sounds  No murmur heard  No friction rub  Pulmonary:      Effort: Pulmonary effort is normal  No respiratory distress  Breath sounds: Normal breath sounds  No stridor  No wheezing or rhonchi  Genitourinary:     Comments: Left nephrostomy in place draining clear yellow urine  Flushed well  Musculoskeletal:         General: No swelling or tenderness  Normal range of motion  Skin:     General: Skin is warm and dry  Coloration: Skin is not jaundiced or pale  Neurological:      General: No focal deficit present  Mental Status: She is alert and oriented to person, place, and time  Psychiatric:         Mood and Affect: Mood normal          Behavior: Behavior normal          Thought Content:  Thought content normal            Vital Signs     Vitals:    03/03/22 1014   BP: 116/64   Pulse: 87   Resp: 16   SpO2: 98%   Weight: 78 9 kg (174 lb)   Height: 5' 4" (1 626 m)         Current Medications       Current Outpatient Medications:     albuterol (ProAir HFA) 90 mcg/act inhaler, Inhale 1-2 puffs every 4 (four) hours as needed for wheezing, Disp: 18 g, Rfl: 3    aspirin 81 MG tablet, Take 1 tablet by mouth daily, Disp: , Rfl:     atorvastatin (LIPITOR) 40 mg tablet, Take 0 5 tablets (20 mg total) by mouth daily at bedtime, Disp: 90 tablet, Rfl: 1    cefdinir (OMNICEF) 300 mg capsule, Take 1 capsule (300 mg total) by mouth every 12 (twelve) hours for 3 days Start the day before stent removal so that you are finishing the day after stent removal, Disp: 24 capsule, Rfl: 0    cholecalciferol (VITAMIN D3) 1,000 units tablet, Take 1,000 Units by mouth daily  , Disp: , Rfl:     cyanocobalamin (VITAMIN B-12) 500 MCG tablet, Take 500 mcg by mouth daily, Disp: , Rfl:     docusate sodium (COLACE) 100 mg capsule, Take 1 capsule (100 mg total) by mouth 2 (two) times a day, Disp: 30 capsule, Rfl: 1    Empagliflozin 25 MG TABS, Take 1 tablet (25 mg total) by mouth every morning For diabetes, Disp: 90 tablet, Rfl: 1    ferrous sulfate 324 (65 Fe) mg, Take 1 tablet (324 mg total) by mouth every other day, Disp: , Rfl:     glucose blood (OneTouch Ultra) test strip, Use 1 each 3 (three) times a day, Disp: 300 each, Rfl: 3    HYDROcodone-acetaminophen (Norco) 5-325 mg per tablet, Take 1 tablet by mouth every 6 (six) hours as needed for pain Max Daily Amount: 4 tablets, Disp: 10 tablet, Rfl: 0    insulin glargine (Lantus SoloStar) 100 units/mL injection pen, Inject 20 Units under the skin daily at bedtime Inject 16 units daily, Disp: 30 mL, Rfl: 1    Insulin Pen Needle (Pen Needles) 32G X 4 MM MISC, Use daily at bedtime, Disp: 100 each, Rfl: 3    Lancets (OneTouch Delica Plus [id number]) MISC, Test TID, Disp: 300 each, Rfl: 3    lisinopril (ZESTRIL) 5 mg tablet, Take 1 tablet (5 mg total) by mouth daily, Disp: 90 tablet, Rfl: 3    metFORMIN (GLUCOPHAGE-XR) 500 mg 24 hr tablet, Take 2 tablets (1,000 mg total) by mouth 2 (two) times a day with meals, Disp: 360 tablet, Rfl: 3    montelukast (SINGULAIR) 10 mg tablet, Take 1 tablet (10 mg total) by mouth every evening, Disp: 90 tablet, Rfl: 3    oxybutynin (DITROPAN) 5 mg tablet, Take 1 tablet (5 mg total) by mouth 3 (three) times a day as needed (stent pain), Disp: 30 tablet, Rfl: 1    Semaglutide,0 25 or 0 5MG/DOS, 2 MG/1 5ML SOPN, Inject 0 25 mg under the skin every 7 days for 30 days, THEN 0 5 mg every 7 days  , Disp: 4 5 mL, Rfl: 0    sodium chloride, PF, 0 9 %, 10 mL by Intracatheter route daily Intracatheter flushing daily, Disp: 300 mL, Rfl: 2      Active Problems     Patient Active Problem List   Diagnosis    Benign essential hypertension    Hyperlipidemia    Uncontrolled type 2 diabetes mellitus with hyperglycemia (HCC)    Kidney stone    Mild persistent asthma    Allergic rhinitis    Anemia    Staghorn renal calculus    Incomplete emptying of bladder    Restrictive airway disease         Past Medical History     Past Medical History:   Diagnosis Date    Abnormal LFTs (liver function tests)     last assessed 14    Anemia     last assessed 14    Anesthesia complication     Restrictive airway diesease- has asthma attack when extubated    Asthma     Diabetes mellitus ([facility] )     Diabetes mellitus due to underlying condition with hyperglycemia, without long-term current use of insulin ([facility] ) 2020    Elevated blood pressure reading     last assessed 13    GERD (gastroesophageal reflux disease)     Hematuria     last assessed 14    Hypercalcemia     last assessed 14    Hyperlipidemia     Kidney stone     Leukocytosis     last assessed     Microalbuminuria     last assessed 14    Restrictive lung disease     Type 2 diabetes mellitus with hyperglycemia ([facility] ) 2013    Type 2 diabetes mellitus without complication, without long-term current use of insulin ([facility] ) 2020         Surgical History     Past Surgical History:   Procedure Laterality Date     SECTION      HYSTERECTOMY      age 37 per MRS    IR NEPHROURETERAL ACCESS FOR UROLOGY PCNL  3/1/2022    OOPHORECTOMY Bilateral     age 37 per MRS, with hysterectomy    TOTAL ABDOMINAL HYSTERECTOMY W/ BILATERAL SALPINGOOPHORECTOMY           Family History     Family History Problem Relation Age of Onset    Diabetes Father         borderline    Melanoma Father     No Known Problems Mother     No Known Problems Sister     No Known Problems Daughter     No Known Problems Maternal Grandmother     No Known Problems Maternal Grandfather     No Known Problems Paternal Grandmother     No Known Problems Paternal Grandfather     No Known Problems Maternal Aunt     No Known Problems Paternal Aunt          Social History     Social History       Radiology

## 2022-03-03 NOTE — PROGRESS NOTES
PA denied for Ozempic, patient will be [facility]

## 2022-03-04 ENCOUNTER — DOCUMENTATION (OUTPATIENT)
Dept: INTERNAL MEDICINE CLINIC | Facility: CLINIC | Age: 57
End: 2022-03-04

## 2022-03-04 LAB
BACTERIA SPEC BFLD CULT: NO GROWTH
BACTERIA UR CULT: NORMAL
GRAM STN SPEC: NORMAL
GRAM STN SPEC: NORMAL

## 2022-03-04 RX ORDER — DULAGLUTIDE 0.75 MG/.5ML
0.75 INJECTION, SOLUTION SUBCUTANEOUS
Qty: 2 ML | Refills: 0 | Status: SHIPPED | OUTPATIENT
Start: 2022-03-04 | End: 2022-03-29

## 2022-03-04 NOTE — PROGRESS NOTES
Noted Ozempic not covered by insurance but Trulicity is  Entering Trulicity order  Informed patient, patient voiced understanding and will start after surgery as discussed

## 2022-03-04 NOTE — PROGRESS NOTES
Prior auth filled out for [employer] via [address] [place] [address]  Waiting for determination

## 2022-03-06 ENCOUNTER — ANESTHESIA EVENT (OUTPATIENT)
Dept: PERIOP | Facility: HOSPITAL | Age: 57
DRG: 443 | End: 2022-03-06
Payer: MEDICARE

## 2022-03-06 NOTE — ANESTHESIA PREPROCEDURE EVALUATION
Procedure:  NEPHROLITHOTOMY  PERCUTANEOUS (PCNL) (Left Abdomen)    Relevant Problems   ANESTHESIA (within normal limits)      CARDIO   (+) Benign essential hypertension   (+) Hyperlipidemia      GI/HEPATIC (within normal limits)      /RENAL   (+) Kidney stone   (+) Staghorn renal calculus      HEMATOLOGY   (+) Anemia      PULMONARY   (+) Mild persistent asthma      Other   (+) Allergic rhinitis   (+) Uncontrolled type 2 diabetes mellitus with hyperglycemia (HCC)      Lantus 20 U taken last night    EKG 2/21/2022:  Normal sinus rhythm  Low voltage QRS  Nonspecific ST abnormality  No previous ECGs available    Lab Results   Component Value Date    WBC 9 70 03/01/2022    HGB 11 4 (L) 03/01/2022    HCT 36 9 03/01/2022    MCV 85 03/01/2022     (H) 03/01/2022     Lab Results   Component Value Date    SODIUM 137 02/21/2022    K 4 2 02/21/2022     02/21/2022    CO2 24 02/21/2022    BUN 20 02/21/2022    CREATININE 0 87 02/21/2022    GLUC 234 (H) 12/18/2020    CALCIUM 9 3 02/21/2022     Lab Results   Component Value Date    INR 0 81 (L) 03/01/2022    PROTIME 11 3 (L) 03/01/2022     Lab Results   Component Value Date    HGBA1C 7 1 (H) 02/21/2022          Physical Exam    Airway    Mallampati score: II  TM Distance: >3 FB  Neck ROM: full     Dental   No notable dental hx     Cardiovascular      Pulmonary  Pulmonary exam normal     Other Findings        Anesthesia Plan  ASA Score- 2     Anesthesia Type- general with ASA Monitors  Additional Monitors:   Airway Plan: ETT  Plan Factors-    Chart reviewed  EKG reviewed  Existing labs reviewed  Patient summary reviewed  Induction- intravenous  Postoperative Plan-     Informed Consent- Anesthetic plan and risks discussed with patient  I personally reviewed this patient with the CRNA  Discussed and agreed on the Anesthesia Plan with the CRNA  Lakesha Diane

## 2022-03-07 ENCOUNTER — ANESTHESIA (OUTPATIENT)
Dept: PERIOP | Facility: HOSPITAL | Age: 57
DRG: 443 | End: 2022-03-07
Payer: MEDICARE

## 2022-03-07 ENCOUNTER — HOSPITAL ENCOUNTER (INPATIENT)
Facility: HOSPITAL | Age: 57
LOS: 1 days | Discharge: HOME | DRG: 443 | End: 2022-03-10
Attending: UROLOGY | Admitting: UROLOGY
Payer: MEDICARE

## 2022-03-07 ENCOUNTER — APPOINTMENT (OUTPATIENT)
Dept: RADIOLOGY | Facility: HOSPITAL | Age: 57
DRG: 443 | End: 2022-03-07
Payer: MEDICARE

## 2022-03-07 DIAGNOSIS — N20.0 STAGHORN RENAL CALCULUS: ICD-10-CM

## 2022-03-07 LAB
ANION GAP SERPL CALCULATED.3IONS-SCNC: 14 MMOL/L (ref 4–13)
BUN SERPL-MCNC: 23 MG/DL (ref 5–25)
CALCIUM SERPL-MCNC: 8.8 MG/DL (ref 8.3–10.1)
CHLORIDE SERPL-SCNC: 104 MMOL/L (ref 100–108)
CO2 SERPL-SCNC: 22 MMOL/L (ref 21–32)
CREAT SERPL-MCNC: 0.79 MG/DL (ref 0.6–1.3)
ERYTHROCYTE [DISTWIDTH] IN BLOOD BY AUTOMATED COUNT: 13.8 % (ref 11.6–15.1)
GFR SERPL CREATININE-BSD FRML MDRD: 82 ML/MIN/1.73SQ M
GLUCOSE P FAST SERPL-MCNC: 137 MG/DL (ref 65–99)
GLUCOSE SERPL-MCNC: 116 MG/DL (ref 65–140)
GLUCOSE SERPL-MCNC: 137 MG/DL (ref 65–140)
GLUCOSE SERPL-MCNC: 148 MG/DL (ref 65–140)
GLUCOSE SERPL-MCNC: 260 MG/DL (ref 65–140)
GLUCOSE SERPL-MCNC: 285 MG/DL (ref 65–140)
HCT VFR BLD AUTO: 33 % (ref 34.8–46.1)
HGB BLD-MCNC: 10.6 G/DL (ref 11.5–15.4)
MCH RBC QN AUTO: 25.7 PG (ref 26.8–34.3)
MCHC RBC AUTO-ENTMCNC: 32.1 G/DL (ref 31.4–37.4)
MCV RBC AUTO: 80 FL (ref 82–98)
PLATELET # BLD AUTO: 424 THOUSANDS/UL (ref 149–390)
PMV BLD AUTO: 9.3 FL (ref 8.9–12.7)
POTASSIUM SERPL-SCNC: 4 MMOL/L (ref 3.5–5.3)
RBC # BLD AUTO: 4.12 MILLION/UL (ref 3.81–5.12)
SODIUM SERPL-SCNC: 140 MMOL/L (ref 136–145)
WBC # BLD AUTO: 13.76 THOUSAND/UL (ref 4.31–10.16)

## 2022-03-07 PROCEDURE — 74420 UROGRAPHY RTRGR +-KUB: CPT

## 2022-03-07 PROCEDURE — 82948 REAGENT STRIP/BLOOD GLUCOSE: CPT

## 2022-03-07 PROCEDURE — 80048 BASIC METABOLIC PNL TOTAL CA: CPT | Performed by: UROLOGY

## 2022-03-07 PROCEDURE — C1758 CATHETER, URETERAL: HCPCS | Performed by: UROLOGY

## 2022-03-07 PROCEDURE — C1729 CATH, DRAINAGE: HCPCS | Performed by: UROLOGY

## 2022-03-07 PROCEDURE — 0T778DZ DILATION OF LEFT URETER WITH INTRALUMINAL DEVICE, VIA NATURAL OR ARTIFICIAL OPENING ENDOSCOPIC: ICD-10-PCS | Performed by: UROLOGY

## 2022-03-07 PROCEDURE — C1769 GUIDE WIRE: HCPCS | Performed by: UROLOGY

## 2022-03-07 PROCEDURE — 82360 CALCULUS ASSAY QUANT: CPT | Performed by: UROLOGY

## 2022-03-07 PROCEDURE — C1726 CATH, BAL DIL, NON-VASCULAR: HCPCS | Performed by: UROLOGY

## 2022-03-07 PROCEDURE — 50081 PERQ NL/PL LITHOTRP CPLX>2CM: CPT | Performed by: UROLOGY

## 2022-03-07 PROCEDURE — 0T913ZZ DRAINAGE OF LEFT KIDNEY, PERCUTANEOUS APPROACH: ICD-10-PCS | Performed by: UROLOGY

## 2022-03-07 PROCEDURE — 85027 COMPLETE CBC AUTOMATED: CPT | Performed by: UROLOGY

## 2022-03-07 PROCEDURE — 0TC43ZZ EXTIRPATION OF MATTER FROM LEFT KIDNEY PELVIS, PERCUTANEOUS APPROACH: ICD-10-PCS | Performed by: UROLOGY

## 2022-03-07 PROCEDURE — C2617 STENT, NON-COR, TEM W/O DEL: HCPCS | Performed by: UROLOGY

## 2022-03-07 DEVICE — STENT URETERAL 6FR 24CML INLAY OPTIMA
Type: IMPLANTABLE DEVICE | Site: URETER | Status: NON-FUNCTIONAL
Removed: 2022-04-20

## 2022-03-07 RX ORDER — MIDAZOLAM HYDROCHLORIDE 2 MG/2ML
INJECTION, SOLUTION INTRAMUSCULAR; INTRAVENOUS AS NEEDED
Status: DISCONTINUED | OUTPATIENT
Start: 2022-03-07 | End: 2022-03-07

## 2022-03-07 RX ORDER — SODIUM CHLORIDE, SODIUM LACTATE, POTASSIUM CHLORIDE, CALCIUM CHLORIDE 600; 310; 30; 20 MG/100ML; MG/100ML; MG/100ML; MG/100ML
125 INJECTION, SOLUTION INTRAVENOUS CONTINUOUS
Status: DISCONTINUED | OUTPATIENT
Start: 2022-03-07 | End: 2022-03-09 | Stop reason: ALTCHOICE

## 2022-03-07 RX ORDER — DOCUSATE SODIUM 100 MG/1
100 CAPSULE, LIQUID FILLED ORAL 2 TIMES DAILY
Status: DISCONTINUED | OUTPATIENT
Start: 2022-03-07 | End: 2022-03-10 | Stop reason: HOSPADM

## 2022-03-07 RX ORDER — METOCLOPRAMIDE HYDROCHLORIDE 5 MG/ML
10 INJECTION INTRAMUSCULAR; INTRAVENOUS ONCE AS NEEDED
Status: DISCONTINUED | OUTPATIENT
Start: 2022-03-07 | End: 2022-03-07 | Stop reason: HOSPADM

## 2022-03-07 RX ORDER — ONDANSETRON 2 MG/ML
4 INJECTION INTRAMUSCULAR; INTRAVENOUS EVERY 6 HOURS PRN
Status: DISCONTINUED | OUTPATIENT
Start: 2022-03-07 | End: 2022-03-10 | Stop reason: HOSPADM

## 2022-03-07 RX ORDER — INSULIN GLARGINE 100 [IU]/ML
15 INJECTION, SOLUTION SUBCUTANEOUS
Status: DISCONTINUED | OUTPATIENT
Start: 2022-03-07 | End: 2022-03-10 | Stop reason: HOSPADM

## 2022-03-07 RX ORDER — LIDOCAINE HYDROCHLORIDE 20 MG/ML
INJECTION, SOLUTION EPIDURAL; INFILTRATION; INTRACAUDAL; PERINEURAL AS NEEDED
Status: DISCONTINUED | OUTPATIENT
Start: 2022-03-07 | End: 2022-03-07

## 2022-03-07 RX ORDER — HYDROMORPHONE HCL/PF 1 MG/ML
0.5 SYRINGE (ML) INJECTION
Status: DISCONTINUED | OUTPATIENT
Start: 2022-03-07 | End: 2022-03-07 | Stop reason: HOSPADM

## 2022-03-07 RX ORDER — SODIUM CHLORIDE, SODIUM LACTATE, POTASSIUM CHLORIDE, CALCIUM CHLORIDE 600; 310; 30; 20 MG/100ML; MG/100ML; MG/100ML; MG/100ML
INJECTION, SOLUTION INTRAVENOUS CONTINUOUS PRN
Status: DISCONTINUED | OUTPATIENT
Start: 2022-03-07 | End: 2022-03-07

## 2022-03-07 RX ORDER — PROPOFOL 10 MG/ML
INJECTION, EMULSION INTRAVENOUS AS NEEDED
Status: DISCONTINUED | OUTPATIENT
Start: 2022-03-07 | End: 2022-03-07

## 2022-03-07 RX ORDER — EPHEDRINE SULFATE 50 MG/ML
INJECTION INTRAVENOUS AS NEEDED
Status: DISCONTINUED | OUTPATIENT
Start: 2022-03-07 | End: 2022-03-07

## 2022-03-07 RX ORDER — MAGNESIUM HYDROXIDE 1200 MG/15ML
LIQUID ORAL AS NEEDED
Status: DISCONTINUED | OUTPATIENT
Start: 2022-03-07 | End: 2022-03-07 | Stop reason: HOSPADM

## 2022-03-07 RX ORDER — CEFAZOLIN SODIUM 2 G/50ML
2000 SOLUTION INTRAVENOUS ONCE
Status: COMPLETED | OUTPATIENT
Start: 2022-03-07 | End: 2022-03-07

## 2022-03-07 RX ORDER — ALBUTEROL SULFATE 90 UG/1
AEROSOL, METERED RESPIRATORY (INHALATION) AS NEEDED
Status: DISCONTINUED | OUTPATIENT
Start: 2022-03-07 | End: 2022-03-07

## 2022-03-07 RX ORDER — ONDANSETRON 2 MG/ML
INJECTION INTRAMUSCULAR; INTRAVENOUS AS NEEDED
Status: DISCONTINUED | OUTPATIENT
Start: 2022-03-07 | End: 2022-03-07

## 2022-03-07 RX ORDER — LISINOPRIL 5 MG/1
5 TABLET ORAL DAILY
Status: DISCONTINUED | OUTPATIENT
Start: 2022-03-08 | End: 2022-03-10 | Stop reason: HOSPADM

## 2022-03-07 RX ORDER — OXYCODONE HYDROCHLORIDE 5 MG/1
5 TABLET ORAL EVERY 4 HOURS PRN
Status: DISCONTINUED | OUTPATIENT
Start: 2022-03-07 | End: 2022-03-10 | Stop reason: HOSPADM

## 2022-03-07 RX ORDER — KETOROLAC TROMETHAMINE 30 MG/ML
15 INJECTION, SOLUTION INTRAMUSCULAR; INTRAVENOUS EVERY 6 HOURS PRN
Status: DISPENSED | OUTPATIENT
Start: 2022-03-07 | End: 2022-03-09

## 2022-03-07 RX ORDER — ATORVASTATIN CALCIUM 20 MG/1
20 TABLET, FILM COATED ORAL
Status: DISCONTINUED | OUTPATIENT
Start: 2022-03-07 | End: 2022-03-10 | Stop reason: HOSPADM

## 2022-03-07 RX ORDER — ACETAMINOPHEN 325 MG/1
975 TABLET ORAL ONCE
Status: COMPLETED | OUTPATIENT
Start: 2022-03-07 | End: 2022-03-07

## 2022-03-07 RX ORDER — ROCURONIUM BROMIDE 10 MG/ML
INJECTION, SOLUTION INTRAVENOUS AS NEEDED
Status: DISCONTINUED | OUTPATIENT
Start: 2022-03-07 | End: 2022-03-07

## 2022-03-07 RX ORDER — ALBUTEROL SULFATE 2.5 MG/3ML
2.5 SOLUTION RESPIRATORY (INHALATION) ONCE AS NEEDED
Status: DISCONTINUED | OUTPATIENT
Start: 2022-03-07 | End: 2022-03-07 | Stop reason: HOSPADM

## 2022-03-07 RX ORDER — MAGNESIUM SULFATE HEPTAHYDRATE 40 MG/ML
INJECTION, SOLUTION INTRAVENOUS AS NEEDED
Status: DISCONTINUED | OUTPATIENT
Start: 2022-03-07 | End: 2022-03-07

## 2022-03-07 RX ORDER — MONTELUKAST SODIUM 10 MG/1
10 TABLET ORAL EVERY EVENING
Status: DISCONTINUED | OUTPATIENT
Start: 2022-03-07 | End: 2022-03-10 | Stop reason: HOSPADM

## 2022-03-07 RX ORDER — DEXAMETHASONE SODIUM PHOSPHATE 4 MG/ML
INJECTION, SOLUTION INTRA-ARTICULAR; INTRALESIONAL; INTRAMUSCULAR; INTRAVENOUS; SOFT TISSUE AS NEEDED
Status: DISCONTINUED | OUTPATIENT
Start: 2022-03-07 | End: 2022-03-07

## 2022-03-07 RX ORDER — ASPIRIN 81 MG/1
81 TABLET, CHEWABLE ORAL DAILY
Status: DISCONTINUED | OUTPATIENT
Start: 2022-03-08 | End: 2022-03-10 | Stop reason: HOSPADM

## 2022-03-07 RX ORDER — GLYCOPYRROLATE 0.2 MG/ML
INJECTION INTRAMUSCULAR; INTRAVENOUS AS NEEDED
Status: DISCONTINUED | OUTPATIENT
Start: 2022-03-07 | End: 2022-03-07

## 2022-03-07 RX ORDER — ALBUTEROL SULFATE 90 UG/1
2 AEROSOL, METERED RESPIRATORY (INHALATION) EVERY 4 HOURS PRN
Status: DISCONTINUED | OUTPATIENT
Start: 2022-03-07 | End: 2022-03-10 | Stop reason: HOSPADM

## 2022-03-07 RX ORDER — NEOSTIGMINE METHYLSULFATE 1 MG/ML
INJECTION INTRAVENOUS AS NEEDED
Status: DISCONTINUED | OUTPATIENT
Start: 2022-03-07 | End: 2022-03-07

## 2022-03-07 RX ORDER — OXYCODONE HYDROCHLORIDE 10 MG/1
10 TABLET ORAL EVERY 4 HOURS PRN
Status: DISCONTINUED | OUTPATIENT
Start: 2022-03-07 | End: 2022-03-10 | Stop reason: HOSPADM

## 2022-03-07 RX ORDER — ALBUTEROL SULFATE 90 UG/1
2 AEROSOL, METERED RESPIRATORY (INHALATION) EVERY 4 HOURS PRN
Status: DISCONTINUED | OUTPATIENT
Start: 2022-03-07 | End: 2022-03-07 | Stop reason: HOSPADM

## 2022-03-07 RX ORDER — ONDANSETRON 2 MG/ML
4 INJECTION INTRAMUSCULAR; INTRAVENOUS ONCE AS NEEDED
Status: DISCONTINUED | OUTPATIENT
Start: 2022-03-07 | End: 2022-03-07 | Stop reason: HOSPADM

## 2022-03-07 RX ORDER — FENTANYL CITRATE 50 UG/ML
INJECTION, SOLUTION INTRAMUSCULAR; INTRAVENOUS AS NEEDED
Status: DISCONTINUED | OUTPATIENT
Start: 2022-03-07 | End: 2022-03-07

## 2022-03-07 RX ORDER — OXYBUTYNIN CHLORIDE 5 MG/1
5 TABLET ORAL 2 TIMES DAILY
Status: DISCONTINUED | OUTPATIENT
Start: 2022-03-07 | End: 2022-03-10 | Stop reason: HOSPADM

## 2022-03-07 RX ORDER — SODIUM CHLORIDE, SODIUM LACTATE, POTASSIUM CHLORIDE, CALCIUM CHLORIDE 600; 310; 30; 20 MG/100ML; MG/100ML; MG/100ML; MG/100ML
50 INJECTION, SOLUTION INTRAVENOUS CONTINUOUS
Status: DISCONTINUED | OUTPATIENT
Start: 2022-03-07 | End: 2022-03-09 | Stop reason: ALTCHOICE

## 2022-03-07 RX ORDER — CEFAZOLIN SODIUM 2 G/50ML
2000 SOLUTION INTRAVENOUS EVERY 8 HOURS
Status: COMPLETED | OUTPATIENT
Start: 2022-03-07 | End: 2022-03-08

## 2022-03-07 RX ORDER — KETAMINE HCL IN NACL, ISO-OSM 100MG/10ML
SYRINGE (ML) INJECTION AS NEEDED
Status: DISCONTINUED | OUTPATIENT
Start: 2022-03-07 | End: 2022-03-07

## 2022-03-07 RX ORDER — SODIUM CHLORIDE 9 MG/ML
INJECTION, SOLUTION INTRAVENOUS CONTINUOUS PRN
Status: DISCONTINUED | OUTPATIENT
Start: 2022-03-07 | End: 2022-03-07

## 2022-03-07 RX ORDER — FENTANYL CITRATE/PF 50 MCG/ML
50 SYRINGE (ML) INJECTION
Status: DISCONTINUED | OUTPATIENT
Start: 2022-03-07 | End: 2022-03-07 | Stop reason: HOSPADM

## 2022-03-07 RX ADMIN — SODIUM CHLORIDE: 0.9 INJECTION, SOLUTION INTRAVENOUS at 13:25

## 2022-03-07 RX ADMIN — ALBUTEROL SULFATE 4 PUFF: 90 AEROSOL, METERED RESPIRATORY (INHALATION) at 15:28

## 2022-03-07 RX ADMIN — DOCUSATE SODIUM 100 MG: 100 CAPSULE ORAL at 17:54

## 2022-03-07 RX ADMIN — FENTANYL CITRATE 50 MCG: 50 INJECTION, SOLUTION INTRAMUSCULAR; INTRAVENOUS at 14:46

## 2022-03-07 RX ADMIN — NEOSTIGMINE METHYLSULFATE 3 MG: 1 INJECTION INTRAVENOUS at 15:29

## 2022-03-07 RX ADMIN — Medication 10 MG: at 13:43

## 2022-03-07 RX ADMIN — SODIUM CHLORIDE, SODIUM LACTATE, POTASSIUM CHLORIDE, AND CALCIUM CHLORIDE 125 ML/HR: .6; .31; .03; .02 INJECTION, SOLUTION INTRAVENOUS at 19:25

## 2022-03-07 RX ADMIN — GLYCOPYRROLATE 0.6 MG: 0.2 INJECTION, SOLUTION INTRAMUSCULAR; INTRAVENOUS at 15:29

## 2022-03-07 RX ADMIN — SODIUM CHLORIDE, SODIUM LACTATE, POTASSIUM CHLORIDE, AND CALCIUM CHLORIDE: .6; .31; .03; .02 INJECTION, SOLUTION INTRAVENOUS at 12:00

## 2022-03-07 RX ADMIN — CEFAZOLIN SODIUM 2000 MG: 2 SOLUTION INTRAVENOUS at 12:55

## 2022-03-07 RX ADMIN — SODIUM CHLORIDE, SODIUM LACTATE, POTASSIUM CHLORIDE, AND CALCIUM CHLORIDE 50 ML/HR: .6; .31; .03; .02 INJECTION, SOLUTION INTRAVENOUS at 17:55

## 2022-03-07 RX ADMIN — INSULIN GLARGINE 15 UNITS: 100 INJECTION, SOLUTION SUBCUTANEOUS at 22:51

## 2022-03-07 RX ADMIN — Medication 10 MG: at 13:55

## 2022-03-07 RX ADMIN — ROCURONIUM BROMIDE 20 MG: 10 INJECTION, SOLUTION INTRAVENOUS at 14:46

## 2022-03-07 RX ADMIN — ACETAMINOPHEN 975 MG: 325 TABLET, FILM COATED ORAL at 11:39

## 2022-03-07 RX ADMIN — Medication 30 MG: at 13:09

## 2022-03-07 RX ADMIN — FENTANYL CITRATE 100 MCG: 50 INJECTION, SOLUTION INTRAMUSCULAR; INTRAVENOUS at 13:08

## 2022-03-07 RX ADMIN — ONDANSETRON 4 MG: 2 INJECTION INTRAMUSCULAR; INTRAVENOUS at 15:29

## 2022-03-07 RX ADMIN — FENTANYL CITRATE 50 MCG: 50 INJECTION, SOLUTION INTRAMUSCULAR; INTRAVENOUS at 14:06

## 2022-03-07 RX ADMIN — MIDAZOLAM HYDROCHLORIDE 2 MG: 1 INJECTION, SOLUTION INTRAMUSCULAR; INTRAVENOUS at 12:15

## 2022-03-07 RX ADMIN — ATORVASTATIN CALCIUM 20 MG: 20 TABLET, FILM COATED ORAL at 21:04

## 2022-03-07 RX ADMIN — ALBUTEROL SULFATE 2 PUFF: 90 AEROSOL, METERED RESPIRATORY (INHALATION) at 16:23

## 2022-03-07 RX ADMIN — MONTELUKAST 10 MG: 10 TABLET, FILM COATED ORAL at 17:54

## 2022-03-07 RX ADMIN — LIDOCAINE HYDROCHLORIDE 2 ML: 20 INJECTION, SOLUTION EPIDURAL; INFILTRATION; INTRACAUDAL at 13:11

## 2022-03-07 RX ADMIN — OXYCODONE HYDROCHLORIDE 10 MG: 10 TABLET ORAL at 17:05

## 2022-03-07 RX ADMIN — SODIUM CHLORIDE: 0.9 INJECTION, SOLUTION INTRAVENOUS at 14:54

## 2022-03-07 RX ADMIN — CEFAZOLIN SODIUM 2000 MG: 2 SOLUTION INTRAVENOUS at 21:04

## 2022-03-07 RX ADMIN — EPHEDRINE SULFATE 10 MG: 50 INJECTION, SOLUTION INTRAVENOUS at 13:17

## 2022-03-07 RX ADMIN — EPHEDRINE SULFATE 5 MG: 50 INJECTION, SOLUTION INTRAVENOUS at 13:19

## 2022-03-07 RX ADMIN — PHENYLEPHRINE HYDROCHLORIDE 20 MCG/MIN: 10 INJECTION INTRAVENOUS at 13:17

## 2022-03-07 RX ADMIN — LIDOCAINE HYDROCHLORIDE 100 MG: 20 INJECTION, SOLUTION INTRAVENOUS at 13:08

## 2022-03-07 RX ADMIN — ALBUTEROL SULFATE 2 PUFF: 90 AEROSOL, METERED RESPIRATORY (INHALATION) at 21:11

## 2022-03-07 RX ADMIN — KETOROLAC TROMETHAMINE 15 MG: 30 INJECTION, SOLUTION INTRAMUSCULAR at 19:23

## 2022-03-07 RX ADMIN — OXYBUTYNIN CHLORIDE 5 MG: 5 TABLET ORAL at 17:54

## 2022-03-07 RX ADMIN — MAGNESIUM SULFATE IN WATER 2 G: 40 INJECTION, SOLUTION INTRAVENOUS at 12:00

## 2022-03-07 RX ADMIN — DEXAMETHASONE SODIUM PHOSPHATE 10 MG: 4 INJECTION INTRA-ARTICULAR; INTRALESIONAL; INTRAMUSCULAR; INTRAVENOUS; SOFT TISSUE at 13:09

## 2022-03-07 RX ADMIN — PROPOFOL 150 MG: 10 INJECTION, EMULSION INTRAVENOUS at 13:08

## 2022-03-07 RX ADMIN — ROCURONIUM BROMIDE 50 MG: 10 INJECTION, SOLUTION INTRAVENOUS at 13:10

## 2022-03-07 NOTE — PLAN OF CARE
Problem: PAIN - ADULT  Goal: Verbalizes/displays adequate comfort level or baseline comfort level  Description: Interventions:  - Encourage patient to monitor pain and request assistance  - Assess pain using appropriate pain scale  - Administer analgesics based on type and severity of pain and evaluate response  - Implement non-pharmacological measures as appropriate and evaluate response  - Consider cultural and social influences on pain and pain management  - Notify physician/advanced practitioner if interventions unsuccessful or patient reports new pain  Outcome: Progressing     Problem: SAFETY ADULT  Goal: Maintain or return to baseline ADL function  Description: INTERVENTIONS:  -  Assess patient's ability to carry out ADLs; assess patient's baseline for ADL function and identify physical deficits which impact ability to perform ADLs (bathing, care of mouth/teeth, toileting, grooming, dressing, etc )  - Assess/evaluate cause of self-care deficits   - Assess range of motion  - Assess patient's mobility; develop plan if impaired  - Assess patient's need for assistive devices and provide as appropriate  - Encourage maximum independence but intervene and supervise when necessary  - Involve family in performance of ADLs  - Assess for home care needs following discharge   - Consider OT consult to assist with ADL evaluation and planning for discharge  - Provide patient education as appropriate  Outcome: Progressing     Problem: GENITOURINARY - ADULT  Goal: Maintains or returns to baseline urinary function  Description: INTERVENTIONS:  - Assess urinary function  - Encourage oral fluids to ensure adequate hydration if ordered  - Administer IV fluids as ordered to ensure adequate hydration  - Administer ordered medications as needed  - Offer frequent toileting  - Follow urinary retention protocol if ordered  Outcome: Progressing     Problem: GENITOURINARY - ADULT  Goal: Absence of urinary retention  Description: INTERVENTIONS:  - Assess patients ability to void and empty bladder  - Monitor I/O  - Bladder scan as needed  - Discuss with physician/AP medications to alleviate retention as needed  - Discuss catheterization for long term situations as appropriate  Outcome: Progressing     Problem: GENITOURINARY - ADULT  Goal: Urinary catheter remains patent  Description: INTERVENTIONS:  - Assess patency of urinary catheter  - If patient has a chronic hurt, consider changing catheter if non-functioning  - Follow guidelines for intermittent irrigation of non-functioning urinary catheter  Outcome: Progressing

## 2022-03-07 NOTE — ANESTHESIA POSTPROCEDURE EVALUATION
Post-Op Assessment Note    CV Status:  Stable  Pain Score: 0    Pain management: adequate     Mental Status:  Awake and alert   Hydration Status:  Stable   PONV Controlled:  Controlled   Airway Patency:  Patent      Post Op Vitals Reviewed: Yes      Staff: CRNA         No complications documented      BP   138/83   Temp 97 8   Pulse 105   Resp   9   SpO2 100

## 2022-03-07 NOTE — INTERVAL H&P NOTE
H&P reviewed  After examining the patient I find no changes in the patients condition since the H&P had been written  Vitals:    03/07/22 1124   BP: 128/76   Pulse: (!) 107   Resp: 20   Temp: (!) 97 2 °F (36 2 °C)   SpO2: 96%     Patient denies any changse to health  Ucx from PCN stick was negative

## 2022-03-07 NOTE — OP NOTE
OPERATIVE REPORT  PATIENT NAME: Leonardo Laws    :  1965  MRN: [id number]  Pt Location: AN OR ROOM 03    SURGERY DATE: 3/7/2022    Surgeon(s) and Role:     * Hola Leach MD - Primary    Preop Diagnosis:  Left Staghorn renal calculus [N20 0]    Post-Op Diagnosis Codes:     Left Staghorn renal calculus [N20 0]    Procedure(s) (LRB):  NEPHROLITHOTOMY  PERCUTANEOUS (PCNL) (Left) >2cm  Left antegrade stent placement  Left antegrade nephrostogram  Left flexible pyeloscopy  Left PCN placement      Specimen(s):  ID Type Source Tests Collected by Time Destination   A : LEFT RENAL STONE Calculus Kidney, Left STONE ANALYSIS Hola Leach MD 3/7/2022 1358        Estimated Blood Loss:   75 mL    Drains:  Nephrostomy Left 1 10 2 Fr  (Active)   Site Assessment Clean; Intact 22 1545   Dressing Status Clean;Dry; Intact 22 1545   Dressing Type Dry dressing 22 1545   Collection Container Standard drainage bag 22 1545   Output (mL) 50 mL 22 1630   Number of days: 0       Urethral Catheter Latex;Straight-tip 18 Fr  (Active)   Reasons to continue Urinary Catheter  Post-operative urological requirements 22 1545   Site Assessment Clean;Skin intact 22 1545   Collection Container Standard drainage bag 22 1545   Securement Method Securing device (Describe) 22 1545   Output (mL) 100 mL 22 1630   Number of days: 0       Ureteral Drain/Stent Left ureter 6 Fr  (Active)   Site Assessment CALIN 22 1545   Number of days: 0       Anesthesia Type:   General    Operative Indications:  Patient with large staghorn tral calculus taking over the renal pelvis interpolar and lower polar calices with upper pole dilatation    Operative Findings:  The patient had yellow stone taking over the entirety of the collecting system except for the upper pole and the stone was abutting the mucosa globally    A large volume of stones (approximately 12 cubic centimeter) was able to be fragmented and suctioned out but not able to address lower pole stone burden because of concern for tearing the collecting system    Complications:   None    Procedure and Technique:  The patient was identified in the pre-op holding area  We again went over the benefits and risks of PCNL surgery to include bleeding, infection, bowel or vascular injury, renal injury, need for secondary procedures including additional stone management  Informed consent was obtained  He was brought to the operating room and placed supine in the operating room theater  General anesthesia was administered  A hurt catheter was placed  She was then placed prone on the fluoroscopic table taking care to pad all contact points  She was secured to the bed with tape and straps  The patient had previously obtained percutaneous access to the kidney by intervention radiology which was dictated in a separate report  The patient was prepped and draped in standard sterile fashion  A timeout was performed identifying the correct patient site and procedure  A guidewire was inserted into the bladder via his existing PCNU tract and passed down to the bladder under fluoroscopic vision  The PCNU was removed and a dual-lumen catheter was then passed over the wire in place to facilitate a secondary superstiff guidewire being placed  The dual lumen catheter was then used to perform an antegrade nephrostogram to define the collecting system showing dilate upper pole and large stone over taking the renal pelvis and lower interpolar calices  The skin incision was sharply extended to approximately 2 cm  A balloon dilator was then passed over the superstiff wire and positioned fluoroscopically such that the distal tip of the balloon was just within the calyx  It was inflated to 17 mmHg and a sheath was inserted over the balloon to the same location  The balloon was taken down and remove  No immediate bleeding was appreciated   The offset nephroscope was then passed through the tract into the upper pole of the collecting system  Stone was encountered in infundibulum to upper pole  It was yellow and hard in nature and abutting the urothelium globally  This was subsequently fragmented with the use of the trilogy lithotripter which was passed through the nephroscope  Which time and effort the renal pelvis was cleared of stone  Attention was turned towards the lower pole  This was quite difficult because this resulted in the scope torquing on the collecting system  Therefore a nephrostogram was performed to ensure there was no tear in the collecting system and there did not appear to be any evidence of extravasation  So attention was again towards lower pole and any visible stone was fragmented with trilogy wand  The nephroscope was remove and a flexible cystoscope was introduced to further evaluate the colleting system  This showed clearance of the upper and renal pelvis but a large residual lower pole stone burden which was hard to see with the rigid scope  Subsequent fluoroscopy demonstrated similar findings with a large residual lower pole stone burden  Another attempt was made with the offset nephroscope and trilogy wand to address further lower pole stone but the lower pole could not be accessed further without significant concern for hurting the collecting system and also putting pressure on the cyber wand  Therefore at this time attention was turned to placing a stent through the nephroscope through which one of the guidewires was passed the scope  A 6 x 24 double-J stent was passed over the guidewire and positioned based on visual guidance in the collecting system and fluoroscopic guidance in the bladder  Thereafter a nephroureteral reentry catheter was passed via the sheath    Antegrade nephrostogram confirmed appropriate positioning as well although it first resided within the collecting system and then pulled back to the upper pole despite two attempts to place in the colleting system  Therefore the sheath was removed  The new percutaneous nephrostomy tube was secured and skin was closed with a silk suture  The soft tissue was closed with vicryl suture  The skin near the tube was closed with 4-0 monocryl  Pressure dressing was applied and patient awakened from anesthesia having tolerated the procedure well  A qualified resident physician was not available  Patient Disposition:  PACU       PLAN:   We will plan to remove the patient's catheter before discharge  Plan for CT scan and expect large lower pole residual stone burden and plan for a additional PCNL through lower pole access at a later date  We will keep her current PCN in place in case as it may help the Interventional Radiology team gain PCN access to the lower pole      SIGNATURE: Woody Harding MD  DATE: March 7, 2022  TIME: 5:50 PM

## 2022-03-08 LAB
ANION GAP SERPL CALCULATED.3IONS-SCNC: 11 MMOL/L (ref 4–13)
BUN SERPL-MCNC: 23 MG/DL (ref 5–25)
CA-I BLD-SCNC: 1.1 MMOL/L (ref 1.12–1.32)
CALCIUM SERPL-MCNC: 8.4 MG/DL (ref 8.3–10.1)
CHLORIDE SERPL-SCNC: 104 MMOL/L (ref 100–108)
CO2 SERPL-SCNC: 22 MMOL/L (ref 21–32)
CREAT SERPL-MCNC: 0.95 MG/DL (ref 0.6–1.3)
ERYTHROCYTE [DISTWIDTH] IN BLOOD BY AUTOMATED COUNT: 14 % (ref 11.6–15.1)
GFR SERPL CREATININE-BSD FRML MDRD: 66 ML/MIN/1.73SQ M
GLUCOSE P FAST SERPL-MCNC: 144 MG/DL (ref 65–99)
GLUCOSE SERPL-MCNC: 120 MG/DL (ref 65–140)
GLUCOSE SERPL-MCNC: 144 MG/DL (ref 65–140)
GLUCOSE SERPL-MCNC: 152 MG/DL (ref 65–140)
GLUCOSE SERPL-MCNC: 175 MG/DL (ref 65–140)
GLUCOSE SERPL-MCNC: 201 MG/DL (ref 65–140)
HCT VFR BLD AUTO: 27.6 % (ref 34.8–46.1)
HGB BLD-MCNC: 8.5 G/DL (ref 11.5–15.4)
MCH RBC QN AUTO: 26.1 PG (ref 26.8–34.3)
MCHC RBC AUTO-ENTMCNC: 30.8 G/DL (ref 31.4–37.4)
MCV RBC AUTO: 85 FL (ref 82–98)
PLATELET # BLD AUTO: 367 THOUSANDS/UL (ref 149–390)
PMV BLD AUTO: 9.4 FL (ref 8.9–12.7)
POTASSIUM SERPL-SCNC: 4.3 MMOL/L (ref 3.5–5.3)
RBC # BLD AUTO: 3.26 MILLION/UL (ref 3.81–5.12)
SODIUM SERPL-SCNC: 137 MMOL/L (ref 136–145)
WBC # BLD AUTO: 12.46 THOUSAND/UL (ref 4.31–10.16)

## 2022-03-08 PROCEDURE — 80048 BASIC METABOLIC PNL TOTAL CA: CPT | Performed by: UROLOGY

## 2022-03-08 PROCEDURE — 87040 BLOOD CULTURE FOR BACTERIA: CPT | Performed by: UROLOGY

## 2022-03-08 PROCEDURE — 99024 POSTOP FOLLOW-UP VISIT: CPT | Performed by: NURSE PRACTITIONER

## 2022-03-08 PROCEDURE — 82948 REAGENT STRIP/BLOOD GLUCOSE: CPT

## 2022-03-08 PROCEDURE — 85027 COMPLETE CBC AUTOMATED: CPT | Performed by: UROLOGY

## 2022-03-08 PROCEDURE — 82330 ASSAY OF CALCIUM: CPT | Performed by: UROLOGY

## 2022-03-08 RX ORDER — HYDROMORPHONE HCL/PF 1 MG/ML
0.5 SYRINGE (ML) INJECTION EVERY 4 HOURS PRN
Status: DISCONTINUED | OUTPATIENT
Start: 2022-03-08 | End: 2022-03-10 | Stop reason: HOSPADM

## 2022-03-08 RX ORDER — ACETAMINOPHEN 325 MG/1
650 TABLET ORAL EVERY 6 HOURS PRN
Status: DISCONTINUED | OUTPATIENT
Start: 2022-03-08 | End: 2022-03-08

## 2022-03-08 RX ORDER — CIPROFLOXACIN 2 MG/ML
400 INJECTION, SOLUTION INTRAVENOUS EVERY 12 HOURS
Status: DISCONTINUED | OUTPATIENT
Start: 2022-03-08 | End: 2022-03-09 | Stop reason: ALTCHOICE

## 2022-03-08 RX ORDER — ACETAMINOPHEN 325 MG/1
650 TABLET ORAL EVERY 6 HOURS PRN
Status: DISCONTINUED | OUTPATIENT
Start: 2022-03-08 | End: 2022-03-10 | Stop reason: HOSPADM

## 2022-03-08 RX ADMIN — OXYBUTYNIN CHLORIDE 5 MG: 5 TABLET ORAL at 09:56

## 2022-03-08 RX ADMIN — ASPIRIN 81 MG: 81 TABLET, CHEWABLE ORAL at 09:54

## 2022-03-08 RX ADMIN — DOCUSATE SODIUM 100 MG: 100 CAPSULE ORAL at 17:50

## 2022-03-08 RX ADMIN — SODIUM CHLORIDE, SODIUM LACTATE, POTASSIUM CHLORIDE, AND CALCIUM CHLORIDE 125 ML/HR: .6; .31; .03; .02 INJECTION, SOLUTION INTRAVENOUS at 01:47

## 2022-03-08 RX ADMIN — KETOROLAC TROMETHAMINE 15 MG: 30 INJECTION, SOLUTION INTRAMUSCULAR at 10:47

## 2022-03-08 RX ADMIN — KETOROLAC TROMETHAMINE 15 MG: 30 INJECTION, SOLUTION INTRAMUSCULAR at 03:34

## 2022-03-08 RX ADMIN — MONTELUKAST 10 MG: 10 TABLET, FILM COATED ORAL at 17:50

## 2022-03-08 RX ADMIN — SODIUM CHLORIDE, SODIUM LACTATE, POTASSIUM CHLORIDE, AND CALCIUM CHLORIDE 125 ML/HR: .6; .31; .03; .02 INJECTION, SOLUTION INTRAVENOUS at 17:56

## 2022-03-08 RX ADMIN — INSULIN LISPRO 2 UNITS: 100 INJECTION, SOLUTION INTRAVENOUS; SUBCUTANEOUS at 17:51

## 2022-03-08 RX ADMIN — KETOROLAC TROMETHAMINE 15 MG: 30 INJECTION, SOLUTION INTRAMUSCULAR at 20:14

## 2022-03-08 RX ADMIN — SODIUM CHLORIDE, SODIUM LACTATE, POTASSIUM CHLORIDE, AND CALCIUM CHLORIDE 125 ML/HR: .6; .31; .03; .02 INJECTION, SOLUTION INTRAVENOUS at 10:00

## 2022-03-08 RX ADMIN — CEFAZOLIN SODIUM 2000 MG: 2 SOLUTION INTRAVENOUS at 04:53

## 2022-03-08 RX ADMIN — ATORVASTATIN CALCIUM 20 MG: 20 TABLET, FILM COATED ORAL at 22:04

## 2022-03-08 RX ADMIN — OXYCODONE HYDROCHLORIDE 10 MG: 10 TABLET ORAL at 01:40

## 2022-03-08 RX ADMIN — INSULIN LISPRO 1 UNITS: 100 INJECTION, SOLUTION INTRAVENOUS; SUBCUTANEOUS at 11:54

## 2022-03-08 RX ADMIN — OXYCODONE HYDROCHLORIDE 10 MG: 10 TABLET ORAL at 16:30

## 2022-03-08 RX ADMIN — OXYBUTYNIN CHLORIDE 5 MG: 5 TABLET ORAL at 17:50

## 2022-03-08 RX ADMIN — ACETAMINOPHEN 650 MG: 325 TABLET ORAL at 17:50

## 2022-03-08 RX ADMIN — OXYCODONE HYDROCHLORIDE 10 MG: 10 TABLET ORAL at 09:54

## 2022-03-08 RX ADMIN — CIPROFLOXACIN 400 MG: 2 INJECTION, SOLUTION INTRAVENOUS at 17:51

## 2022-03-08 RX ADMIN — INSULIN GLARGINE 15 UNITS: 100 INJECTION, SOLUTION SUBCUTANEOUS at 22:04

## 2022-03-08 RX ADMIN — DOCUSATE SODIUM 100 MG: 100 CAPSULE ORAL at 09:54

## 2022-03-08 NOTE — PROGRESS NOTES
Progress Note - Jonathan Winchester 60 y o  female MRN: [id number]     Unit/Bed#: S -01 Encounter: [id number]        Assessment:  Jonathan Winchester is a 58-year-old female with left staghorn calculus postoperative day 1 left percutaneous nephrolithotomy  Afebrile, hemodynamically stable  Offer complaints of severe left flank pain this morning now resolved  Tolerating diet     Plan:  · Clamp PCN  · Continue pain regimen  · Ambulate and monitor diet tolerance  · Awaiting void  · Tentative discharge this afternoon versus tomorrow  · Patient will eventually require outpatient CT and planning for 2nd procedure     Subjective:   Denies fever, chills  Reports left flank pain      Objective:      Vitals: Blood pressure 127/80, pulse 87, temperature 99 1 °F (37 3 °C), resp  rate 18, height 5' 4" (1 626 m), weight 78 5 kg (173 lb), SpO2 99 %  ,Body mass index is 29 7 kg/m²         Intake/Output Summary (Last 24 hours) at 3/8/2022 1442  Last data filed at 3/8/2022 0458      Gross per 24 hour   Intake 3650 ml   Output 2050 ml   Net 1600 ml         Physical Exam: General appearance: alert and oriented, in no acute distress, appears stated age, cooperative and no distress  Head: Normocephalic, without obvious abnormality, atraumatic  Neck: no adenopathy, no carotid bruit, no JVD, supple, symmetrical, trachea midline and thyroid not enlarged, symmetric, no tenderness/mass/nodules  Lungs: clear to auscultation bilaterally  Heart: regular rate and rhythm, S1, S2 normal, no murmur, click, rub or gallop  Abdomen: abnormal findings:  mild tenderness in the left flank  Extremities: extremities normal, warm and well-perfused; no cyanosis, clubbing, or edema  Pulses: 2+ and symmetric  Neurologic: Grossly normal  Todd--removed      Invasive Devices  Report           Peripheral Intravenous Line                     Peripheral IV 03/07/22 Dorsal (posterior); Right Hand 1 day      Peripheral IV 03/07/22 Left Hand 1 day             Drain                     Nephrostomy Left 1 10 2 Fr  <1 day      Ureteral Drain/Stent Left ureter 6 Fr  <1 day                  Lab Results   Component Value Date     WBC 12 46 (H) 03/08/2022     HGB 8 5 (L) 03/08/2022     HCT 27 6 (L) 03/08/2022     MCV 85 03/08/2022      03/08/2022            Lab Results   Component Value Date     SODIUM 137 03/08/2022     K 4 3 03/08/2022      03/08/2022     CO2 22 03/08/2022     BUN 23 03/08/2022     CREATININE 0 95 03/08/2022     GLUC 144 (H) 03/08/2022     CALCIUM 8 4 03/08/2022            Lab, Imaging and other studies: I have personally reviewed pertinent reports

## 2022-03-08 NOTE — PLAN OF CARE
Problem: PAIN - ADULT  Goal: Verbalizes/displays adequate comfort level or baseline comfort level  Description: Interventions:  - Encourage patient to monitor pain and request assistance  - Assess pain using appropriate pain scale  - Administer analgesics based on type and severity of pain and evaluate response  - Implement non-pharmacological measures as appropriate and evaluate response  - Consider cultural and social influences on pain and pain management  - Notify physician/advanced practitioner if interventions unsuccessful or patient reports new pain  3/7/2022 2041 by Shabbir Hooper RN  Outcome: Progressing  3/7/2022 1721 by Shabbir Hooper RN  Outcome: Progressing     Problem: INFECTION - ADULT  Goal: Absence or prevention of progression during hospitalization  Description: INTERVENTIONS:  - Assess and monitor for signs and symptoms of infection  - Monitor lab/diagnostic results  - Monitor all insertion sites, i e  indwelling lines, tubes, and drains  - Monitor endotracheal if appropriate and nasal secretions for changes in amount and color  - [place] appropriate cooling/warming therapies per order  - Administer medications as ordered  - Instruct and encourage patient and family to use good hand hygiene technique  - Identify and instruct in appropriate isolation precautions for identified infection/condition  3/7/2022 2041 by Shabbir Hooper RN  Outcome: Progressing  3/7/2022 1721 by Shabbir Hooper RN  Outcome: Progressing     Problem: GENITOURINARY - ADULT  Goal: Maintains or returns to baseline urinary function  Description: INTERVENTIONS:  - Assess urinary function  - Encourage oral fluids to ensure adequate hydration if ordered  - Administer IV fluids as ordered to ensure adequate hydration  - Administer ordered medications as needed  - Offer frequent toileting  - Follow urinary retention protocol if ordered  3/7/2022 2041 by Shabbir Hooper RN  Outcome: Progressing  3/7/2022 1721 by Shabbir Farmer RN  Outcome: Progressing     Problem: GENITOURINARY - ADULT  Goal: Absence of urinary retention  Description: INTERVENTIONS:  - Assess patients ability to void and empty bladder  - Monitor I/O  - Bladder scan as needed  - Discuss with physician/AP medications to alleviate retention as needed  - Discuss catheterization for long term situations as appropriate  3/7/2022 2041 by Shabbir Hooper RN  Outcome: Progressing  3/7/2022 1721 by Shabbir Hooper RN  Outcome: Progressing     Problem: GENITOURINARY - ADULT  Goal: Urinary catheter remains patent  Description: INTERVENTIONS:  - Assess patency of urinary catheter  - If patient has a chronic hurt, consider changing catheter if non-functioning  - Follow guidelines for intermittent irrigation of non-functioning urinary catheter  3/7/2022 2041 by Shabbir Hooper RN  Outcome: Progressing  3/7/2022 1721 by Shabbir Hooper RN  Outcome: Progressing

## 2022-03-08 NOTE — TREATMENT PLAN
Sarah Heller is a 57-year-old female with left staghorn calculus postoperative day 1 PCNL  Developed fever in the afternoon  Plan:  · Blood culture  · Tylenol  · Initiate antibiotics  · Continue pain control  · Keep PCNU open   Do not clamp

## 2022-03-08 NOTE — DISCHARGE SUMMARY
Progress Note - Daija Tovar 60 y o  female MRN: [id number]    Unit/Bed#: S -13 Encounter: [id number]      Assessment:  Daija Tovar is a 38-year-old female with left staghorn calculus postoperative day 1 left percutaneous nephrolithotomy  Afebrile, hemodynamically stable  Offer complaints of severe left flank pain this morning now resolved  Tolerating diet    Plan:  · Clamp PCN  · Continue pain regimen  · Ambulate and monitor diet tolerance  · Awaiting void  · Tentative discharge this afternoon versus tomorrow  · Patient will eventually require outpatient CT and planning for 2nd procedure    Subjective:   Denies fever, chills  Reports left flank pain  Objective:     Vitals: Blood pressure 127/80, pulse 87, temperature 99 1 °F (37 3 °C), resp  rate 18, height 5' 4" (1 626 m), weight 78 5 kg (173 lb), SpO2 99 %  ,Body mass index is 29 7 kg/m²  Intake/Output Summary (Last 24 hours) at 3/8/2022 1442  Last data filed at 3/8/2022 0458  Gross per 24 hour   Intake 3650 ml   Output 2050 ml   Net 1600 ml       Physical Exam: General appearance: alert and oriented, in no acute distress, appears stated age, cooperative and no distress  Head: Normocephalic, without obvious abnormality, atraumatic  Neck: no adenopathy, no carotid bruit, no JVD, supple, symmetrical, trachea midline and thyroid not enlarged, symmetric, no tenderness/mass/nodules  Lungs: clear to auscultation bilaterally  Heart: regular rate and rhythm, S1, S2 normal, no murmur, click, rub or gallop  Abdomen: abnormal findings:  mild tenderness in the left flank  Extremities: extremities normal, warm and well-perfused; no cyanosis, clubbing, or edema  Pulses: 2+ and symmetric  Neurologic: Grossly normal  Todd--removed     Invasive Devices  Report    Peripheral Intravenous Line            Peripheral IV 03/07/22 Dorsal (posterior); Right Hand 1 day    Peripheral IV 03/07/22 Left Hand 1 day          Drain            Nephrostomy Left 1 10 2 Fr  <1 day    Ureteral Drain/Stent Left ureter 6 Fr  <1 day              Lab Results   Component Value Date    WBC 12 46 (H) 03/08/2022    HGB 8 5 (L) 03/08/2022    HCT 27 6 (L) 03/08/2022    MCV 85 03/08/2022     03/08/2022     Lab Results   Component Value Date    SODIUM 137 03/08/2022    K 4 3 03/08/2022     03/08/2022    CO2 22 03/08/2022    BUN 23 03/08/2022    CREATININE 0 95 03/08/2022    GLUC 144 (H) 03/08/2022    CALCIUM 8 4 03/08/2022         Lab, Imaging and other studies: I have personally reviewed pertinent reports

## 2022-03-08 NOTE — PLAN OF CARE
Problem: PAIN - ADULT  Goal: Verbalizes/displays adequate comfort level or baseline comfort level  Description: Interventions:  - Encourage patient to monitor pain and request assistance  - Assess pain using appropriate pain scale  - Administer analgesics based on type and severity of pain and evaluate response  - Implement non-pharmacological measures as appropriate and evaluate response  - Consider cultural and social influences on pain and pain management  - Notify physician/advanced practitioner if interventions unsuccessful or patient reports new pain  Outcome: Progressing     Problem: GENITOURINARY - ADULT  Goal: Maintains or returns to baseline urinary function  Description: INTERVENTIONS:  - Assess urinary function  - Encourage oral fluids to ensure adequate hydration if ordered  - Administer IV fluids as ordered to ensure adequate hydration  - Administer ordered medications as needed  - Offer frequent toileting  - Follow urinary retention protocol if ordered  Outcome: Progressing     Problem: GENITOURINARY - ADULT  Goal: Absence of urinary retention  Description: INTERVENTIONS:  - Assess patients ability to void and empty bladder  - Monitor I/O  - Bladder scan as needed  - Discuss with physician/AP medications to alleviate retention as needed  - Discuss catheterization for long term situations as appropriate  Outcome: Progressing     Problem: GENITOURINARY - ADULT  Goal: Urinary catheter remains patent  Description: INTERVENTIONS:  - Assess patency of urinary catheter  - If patient has a chronic hurt, consider changing catheter if non-functioning  - Follow guidelines for intermittent irrigation of non-functioning urinary catheter  Outcome: Progressing

## 2022-03-09 ENCOUNTER — APPOINTMENT (OUTPATIENT)
Dept: CT IMAGING | Facility: HOSPITAL | Age: 57
DRG: 443 | End: 2022-03-09
Payer: MEDICARE

## 2022-03-09 LAB
ANION GAP SERPL CALCULATED.3IONS-SCNC: 9 MMOL/L (ref 4–13)
BUN SERPL-MCNC: 15 MG/DL (ref 5–25)
CALCIUM SERPL-MCNC: 8.7 MG/DL (ref 8.3–10.1)
CHLORIDE SERPL-SCNC: 108 MMOL/L (ref 100–108)
CO2 SERPL-SCNC: 23 MMOL/L (ref 21–32)
CREAT SERPL-MCNC: 0.93 MG/DL (ref 0.6–1.3)
ERYTHROCYTE [DISTWIDTH] IN BLOOD BY AUTOMATED COUNT: 14.1 % (ref 11.6–15.1)
GFR SERPL CREATININE-BSD FRML MDRD: 67 ML/MIN/1.73SQ M
GLUCOSE SERPL-MCNC: 131 MG/DL (ref 65–140)
GLUCOSE SERPL-MCNC: 131 MG/DL (ref 65–140)
GLUCOSE SERPL-MCNC: 168 MG/DL (ref 65–140)
GLUCOSE SERPL-MCNC: 168 MG/DL (ref 65–140)
GLUCOSE SERPL-MCNC: 200 MG/DL (ref 65–140)
HCT VFR BLD AUTO: 28.4 % (ref 34.8–46.1)
HGB BLD-MCNC: 8.8 G/DL (ref 11.5–15.4)
MCH RBC QN AUTO: 26 PG (ref 26.8–34.3)
MCHC RBC AUTO-ENTMCNC: 31 G/DL (ref 31.4–37.4)
MCV RBC AUTO: 84 FL (ref 82–98)
PLATELET # BLD AUTO: 405 THOUSANDS/UL (ref 149–390)
PMV BLD AUTO: 9.2 FL (ref 8.9–12.7)
POTASSIUM SERPL-SCNC: 3.9 MMOL/L (ref 3.5–5.3)
RBC # BLD AUTO: 3.38 MILLION/UL (ref 3.81–5.12)
SODIUM SERPL-SCNC: 140 MMOL/L (ref 136–145)
WBC # BLD AUTO: 13.4 THOUSAND/UL (ref 4.31–10.16)

## 2022-03-09 PROCEDURE — 80048 BASIC METABOLIC PNL TOTAL CA: CPT | Performed by: UROLOGY

## 2022-03-09 PROCEDURE — 85027 COMPLETE CBC AUTOMATED: CPT | Performed by: NURSE PRACTITIONER

## 2022-03-09 PROCEDURE — 71260 CT THORAX DX C+: CPT

## 2022-03-09 PROCEDURE — 74177 CT ABD & PELVIS W/CONTRAST: CPT

## 2022-03-09 PROCEDURE — 82948 REAGENT STRIP/BLOOD GLUCOSE: CPT

## 2022-03-09 PROCEDURE — 99024 POSTOP FOLLOW-UP VISIT: CPT | Performed by: NURSE PRACTITIONER

## 2022-03-09 PROCEDURE — G1004 CDSM NDSC: HCPCS

## 2022-03-09 RX ADMIN — OXYCODONE HYDROCHLORIDE 10 MG: 10 TABLET ORAL at 16:31

## 2022-03-09 RX ADMIN — DOCUSATE SODIUM 100 MG: 100 CAPSULE ORAL at 09:20

## 2022-03-09 RX ADMIN — CEFTRIAXONE SODIUM 1000 MG: 10 INJECTION, POWDER, FOR SOLUTION INTRAVENOUS at 21:50

## 2022-03-09 RX ADMIN — ATORVASTATIN CALCIUM 20 MG: 20 TABLET, FILM COATED ORAL at 21:28

## 2022-03-09 RX ADMIN — LISINOPRIL 5 MG: 5 TABLET ORAL at 09:21

## 2022-03-09 RX ADMIN — INSULIN LISPRO 1 UNITS: 100 INJECTION, SOLUTION INTRAVENOUS; SUBCUTANEOUS at 17:43

## 2022-03-09 RX ADMIN — INSULIN GLARGINE 15 UNITS: 100 INJECTION, SOLUTION SUBCUTANEOUS at 21:28

## 2022-03-09 RX ADMIN — MONTELUKAST 10 MG: 10 TABLET, FILM COATED ORAL at 17:43

## 2022-03-09 RX ADMIN — KETOROLAC TROMETHAMINE 15 MG: 30 INJECTION, SOLUTION INTRAMUSCULAR at 02:40

## 2022-03-09 RX ADMIN — DOCUSATE SODIUM 100 MG: 100 CAPSULE ORAL at 17:43

## 2022-03-09 RX ADMIN — CIPROFLOXACIN 400 MG: 2 INJECTION, SOLUTION INTRAVENOUS at 16:31

## 2022-03-09 RX ADMIN — INSULIN LISPRO 1 UNITS: 100 INJECTION, SOLUTION INTRAVENOUS; SUBCUTANEOUS at 12:10

## 2022-03-09 RX ADMIN — ACETAMINOPHEN 650 MG: 325 TABLET ORAL at 18:24

## 2022-03-09 RX ADMIN — CIPROFLOXACIN 400 MG: 2 INJECTION, SOLUTION INTRAVENOUS at 04:48

## 2022-03-09 RX ADMIN — OXYBUTYNIN CHLORIDE 5 MG: 5 TABLET ORAL at 09:21

## 2022-03-09 RX ADMIN — SODIUM CHLORIDE, SODIUM LACTATE, POTASSIUM CHLORIDE, AND CALCIUM CHLORIDE 125 ML/HR: .6; .31; .03; .02 INJECTION, SOLUTION INTRAVENOUS at 15:50

## 2022-03-09 RX ADMIN — OXYCODONE HYDROCHLORIDE 10 MG: 10 TABLET ORAL at 22:39

## 2022-03-09 RX ADMIN — OXYBUTYNIN CHLORIDE 5 MG: 5 TABLET ORAL at 17:43

## 2022-03-09 RX ADMIN — ASPIRIN 81 MG: 81 TABLET, CHEWABLE ORAL at 09:20

## 2022-03-09 RX ADMIN — IOHEXOL 100 ML: 350 INJECTION, SOLUTION INTRAVENOUS at 10:46

## 2022-03-09 RX ADMIN — ACETAMINOPHEN 650 MG: 325 TABLET ORAL at 03:13

## 2022-03-09 RX ADMIN — SODIUM CHLORIDE, SODIUM LACTATE, POTASSIUM CHLORIDE, AND CALCIUM CHLORIDE 125 ML/HR: .6; .31; .03; .02 INJECTION, SOLUTION INTRAVENOUS at 02:40

## 2022-03-09 RX ADMIN — SODIUM CHLORIDE 1000 ML: 0.9 INJECTION, SOLUTION INTRAVENOUS at 19:59

## 2022-03-09 NOTE — PLAN OF CARE
Problem: MOBILITY - ADULT  Goal: Maintain or return to baseline ADL function  Description: INTERVENTIONS:  -  Assess patient's ability to carry out ADLs; assess patient's baseline for ADL function and identify physical deficits which impact ability to perform ADLs (bathing, care of mouth/teeth, toileting, grooming, dressing, etc )  - Assess/evaluate cause of self-care deficits   - Assess range of motion  - Assess patient's mobility; develop plan if impaired  - Assess patient's need for assistive devices and provide as appropriate  - Encourage maximum independence but intervene and supervise when necessary  - Involve family in performance of ADLs  - Assess for home care needs following discharge   - Consider OT consult to assist with ADL evaluation and planning for discharge  - Provide patient education as appropriate  Outcome: Progressing  Goal: Maintains/Returns to pre admission functional level  Description: INTERVENTIONS:  - Perform BMAT or MOVE assessment daily    - Set and communicate daily mobility goal to care team and patient/family/caregiver  - Collaborate with rehabilitation services on mobility goals if consulted  - Perform Range of Motion 2 times a day  - Reposition patient every 2 hours    - Dangle patient 2 times a day  - Stand patient 2 times a day  - Ambulate patient 2 times a day  - Out of bed to chair 2 times a day   - Out of bed for meals 2 times a day  - Out of bed for toileting  - Record patient progress and toleration of activity level   Outcome: Progressing     Problem: Potential for Falls  Goal: Patient will remain free of falls  Description: INTERVENTIONS:  - Educate patient/family on patient safety including physical limitations  - Instruct patient to call for assistance with activity   - Consult OT/PT to assist with strengthening/mobility   - Keep Call bell within reach  - Keep bed low and locked with side rails adjusted as appropriate  - Keep care items and personal belongings within reach  - Initiate and maintain comfort rounds  - Make Fall Risk Sign visible to staff  - Offer Toileting every 2 Hours, in advance of need  - Initiate/Maintain bed/alarm alarm  - Obtain necessary fall risk management equipment: bed alarm  - Apply yellow socks and bracelet for high fall risk patients  - Consider moving patient to room near nurses station  Outcome: Progressing     Problem: PAIN - ADULT  Goal: Verbalizes/displays adequate comfort level or baseline comfort level  Description: Interventions:  - Encourage patient to monitor pain and request assistance  - Assess pain using appropriate pain scale  - Administer analgesics based on type and severity of pain and evaluate response  - Implement non-pharmacological measures as appropriate and evaluate response  - Consider cultural and social influences on pain and pain management  - Notify physician/advanced practitioner if interventions unsuccessful or patient reports new pain  Outcome: Progressing     Problem: INFECTION - ADULT  Goal: Absence or prevention of progression during hospitalization  Description: INTERVENTIONS:  - Assess and monitor for signs and symptoms of infection  - Monitor lab/diagnostic results  - Monitor all insertion sites, i e  indwelling lines, tubes, and drains  - Monitor endotracheal if appropriate and nasal secretions for changes in amount and color  - [place] appropriate cooling/warming therapies per order  - Administer medications as ordered  - Instruct and encourage patient and family to use good hand hygiene technique  - Identify and instruct in appropriate isolation precautions for identified infection/condition  Outcome: Progressing  Goal: Absence of fever/infection during neutropenic period  Description: INTERVENTIONS:  - Monitor WBC    Outcome: Progressing     Problem: SAFETY ADULT  Goal: Maintain or return to baseline ADL function  Description: INTERVENTIONS:  -  Assess patient's ability to carry out ADLs; assess patient's baseline for ADL function and identify physical deficits which impact ability to perform ADLs (bathing, care of mouth/teeth, toileting, grooming, dressing, etc )  - Assess/evaluate cause of self-care deficits   - Assess range of motion  - Assess patient's mobility; develop plan if impaired  - Assess patient's need for assistive devices and provide as appropriate  - Encourage maximum independence but intervene and supervise when necessary  - Involve family in performance of ADLs  - Assess for home care needs following discharge   - Consider OT consult to assist with ADL evaluation and planning for discharge  - Provide patient education as appropriate  Outcome: Progressing  Goal: Maintains/Returns to pre admission functional level  Description: INTERVENTIONS:  - Perform BMAT or MOVE assessment daily    - Set and communicate daily mobility goal to care team and patient/family/caregiver  - Collaborate with rehabilitation services on mobility goals if consulted  - Perform Range of Motion 2 times a day  - Reposition patient every 2 hours    - Dangle patient 2 times a day  - Stand patient 2 times a day  - Ambulate patient 2 times a day  - Out of bed to chair 2 times a day   - Out of bed for meals 2 times a day  - Out of bed for toileting  - Record patient progress and toleration of activity level   Outcome: Progressing  Goal: Patient will remain free of falls  Description: INTERVENTIONS:  - Educate patient/family on patient safety including physical limitations  - Instruct patient to call for assistance with activity   - Consult OT/PT to assist with strengthening/mobility   - Keep Call bell within reach  - Keep bed low and locked with side rails adjusted as appropriate  - Keep care items and personal belongings within reach  - Initiate and maintain comfort rounds  - Make Fall Risk Sign visible to staff  - Offer Toileting every 2 Hours, in advance of need  - Initiate/Maintain bed/chair alarm  - Obtain necessary fall risk management equipment: bed alarm  - Apply yellow socks and bracelet for high fall risk patients  - Consider moving patient to room near nurses station  Outcome: Progressing     Problem: GENITOURINARY - ADULT  Goal: Maintains or returns to baseline urinary function  Description: INTERVENTIONS:  - Assess urinary function  - Encourage oral fluids to ensure adequate hydration if ordered  - Administer IV fluids as ordered to ensure adequate hydration  - Administer ordered medications as needed  - Offer frequent toileting  - Follow urinary retention protocol if ordered  Outcome: Progressing  Goal: Absence of urinary retention  Description: INTERVENTIONS:  - Assess patients ability to void and empty bladder  - Monitor I/O  - Bladder scan as needed  - Discuss with physician/AP medications to alleviate retention as needed  - Discuss catheterization for long term situations as appropriate  Outcome: Progressing  Goal: Urinary catheter remains patent  Description: INTERVENTIONS:  - Assess patency of urinary catheter  - If patient has a chronic hurt, consider changing catheter if non-functioning  - Follow guidelines for intermittent irrigation of non-functioning urinary catheter  Outcome: Progressing     Problem: METABOLIC, FLUID AND ELECTROLYTES - ADULT  Goal: Electrolytes maintained within normal limits  Description: INTERVENTIONS:  - Monitor labs and assess patient for signs and symptoms of electrolyte imbalances  - Administer electrolyte replacement as ordered  - Monitor response to electrolyte replacements, including repeat lab results as appropriate  - Instruct patient on fluid and nutrition as appropriate  Outcome: Progressing  Goal: Fluid balance maintained  Description: INTERVENTIONS:  - Monitor labs   - Monitor I/O and WT  - Instruct patient on fluid and nutrition as appropriate  - Assess for signs & symptoms of volume excess or deficit  Outcome: Progressing  Goal: Glucose maintained within target range  Description: INTERVENTIONS:  - Monitor Blood Glucose as ordered  - Assess for signs and symptoms of hyperglycemia and hypoglycemia  - Administer ordered medications to maintain glucose within target range  - Assess nutritional intake and initiate nutrition service referral as needed  Outcome: Progressing     Problem: SKIN/TISSUE INTEGRITY - ADULT  Goal: Incision(s), wounds(s) or drain site(s) healing without S/S of infection  Description: INTERVENTIONS  - Assess and document dressing, incision, wound bed, drain sites and surrounding tissue  - Provide patient and family education  - Perform skin care/dressing changes every shift  Outcome: Progressing

## 2022-03-09 NOTE — PLAN OF CARE
Problem: PAIN - ADULT  Goal: Verbalizes/displays adequate comfort level or baseline comfort level  Description: Interventions:  - Encourage patient to monitor pain and request assistance  - Assess pain using appropriate pain scale  - Administer analgesics based on type and severity of pain and evaluate response  - Implement non-pharmacological measures as appropriate and evaluate response  - Consider cultural and social influences on pain and pain management  - Notify physician/advanced practitioner if interventions unsuccessful or patient reports new pain  Outcome: Progressing     Problem: GENITOURINARY - ADULT  Goal: Absence of urinary retention  Description: INTERVENTIONS:  - Assess patients ability to void and empty bladder  - Monitor I/O  - Bladder scan as needed  - Discuss with physician/AP medications to alleviate retention as needed  - Discuss catheterization for long term situations as appropriate  Outcome: Progressing     Problem: METABOLIC, FLUID AND ELECTROLYTES - ADULT  Goal: Electrolytes maintained within normal limits  Description: INTERVENTIONS:  - Monitor labs and assess patient for signs and symptoms of electrolyte imbalances  - Administer electrolyte replacement as ordered  - Monitor response to electrolyte replacements, including repeat lab results as appropriate  - Instruct patient on fluid and nutrition as appropriate  Outcome: Progressing

## 2022-03-09 NOTE — PROGRESS NOTES
Progress Note - Lambert Carter 60 y o  female MRN: [id number]     Unit/Bed#: S -01 Encounter: [id number]        Assessment:  Lambert Carter is a 53-year-old female with left staghorn calculus postoperative day 2 left percutaneous nephrolithotomy  Afebrile, hemodynamically stable  Patient was seen Evsussy Scott in chair  Offer complaints of severe left flank pain this morning  She been ambulating to the bathroom this morning  Tolerating diet       Plan:  · CT chest, abdomen & pelvis scheduled for today  Await results  · Blood cultures pending   · Continue IV antibiotics  · Keep PCNU open, do not clamp  · Continue pain regimen  · Ambulate and monitor diet tolerance  Requires additional hospital stay more than 2 midnights for fever, antibiotics, awaiting cultures in image results; in addition to pain management  Output last 24 hours:  Urine: 3200mL  Nephrostomy: 95mL     Subjective:    Review of Systems   Constitutional: Negative for chills and fever  HENT: Negative for congestion, sore throat and trouble swallowing  Respiratory: Negative for cough, chest tightness and shortness of breath  Cardiovascular: Negative for chest pain, palpitations and leg swelling  Gastrointestinal: Negative for abdominal pain, diarrhea, nausea and vomiting  Genitourinary: Positive for flank pain  Negative for dysuria  Musculoskeletal: Negative for back pain, joint swelling and neck stiffness  Neurological: Negative for dizziness, weakness and headaches  Psychiatric/Behavioral: Negative for agitation and suicidal ideas  The patient is nervous/anxious  Objective:      Vitals: Blood pressure 127/80, pulse 87, temperature 99 1 °F (37 3 °C), resp  rate 18, height 5' 4" (1 626 m), weight 78 5 kg (173 lb), SpO2 99 %  ,Body mass index is 29 7 kg/m²         Intake/Output Summary (Last 24 hours) at 3/8/2022 1442  Last data filed at 3/8/2022 0458      Gross per 24 hour   Intake 3650 ml   Output 2050 ml   Net 1600 ml       Physical Exam:   General appearance: alert and oriented, in no acute distress  Head: Normocephalic, without obvious abnormality, atraumatic  Neck: no adenopathy, no carotid bruit, no JVD, supple, symmetrical, trachea midline and thyroid not enlarged, symmetric, no tenderness/mass/nodules  Lungs: clear to auscultation bilaterally  Heart: regular rate and rhythm, S1, S2 normal, no murmur, click, rub or gallop  Abdomen: normal findings: symmetric and abnormal findings:  left flank pain  Neurologic: Grossly normal            Invasive Devices  Report           Peripheral Intravenous Line                     Peripheral IV 03/07/22 Dorsal (posterior); Right Hand 1 day      Peripheral IV 03/07/22 Left Hand 1 day                   Drain                     Nephrostomy Left 1 10 2 Fr  <1 day      Ureteral Drain/Stent Left ureter 6 Fr  <1 day                  Lab Results   Component Value Date     WBC 12 46 (H) 03/08/2022     HGB 8 5 (L) 03/08/2022     HCT 27 6 (L) 03/08/2022     MCV 85 03/08/2022      03/08/2022            Lab Results   Component Value Date     SODIUM 137 03/08/2022     K 4 3 03/08/2022      03/08/2022     CO2 22 03/08/2022     BUN 23 03/08/2022     CREATININE 0 95 03/08/2022     GLUC 144 (H) 03/08/2022     CALCIUM 8 4 03/08/2022            Lab, Imaging and other studies: I have personally reviewed pertinent reports

## 2022-03-09 NOTE — UTILIZATION REVIEW
Initial Clinical Review    Elective OUTPT surgical procedure [email or web address] POST-OP LEUKOCYTOSIS, FEVER, TACHYCARDIA, AWAITING CULTURES AND NEED FOR IV ANTIBIOTICS AND IV ANALGESICS     03/09/22 1438  Inpatient Admission  Once        Transfer Service: Urology       Question Answer Comment   Level of Care Med Surg    Estimated length of stay More than 2 Midnights    Certification I certify that inpatient services are medically necessary for this patient for a duration of greater than two midnights  See H&P and MD Progress Notes for additional information about the patient's course of treatment  03/09/22 1438       Age/Sex: 60 y o  female  Surgery Date: 3/7/22  Procedure:   NEPHROLITHOTOMY  PERCUTANEOUS (PCNL) (Left) >2cm  Left antegrade stent placement  Left antegrade nephrostogram  Left flexible pyeloscopy  Left PCN placement    Anesthesia: General    Operative Findings: The patient had yellow stone taking over the entirety of the collecting system except for the upper pole and the stone was abutting the mucosa globally  A large volume of stones (approximately 12 cubic centimeter) was able to be fragmented and suctioned out but not able to address lower pole stone burden because of concern for tearing the collecting system    POD#1 Progress Note:   3/8:    Carlos Manuel Arteaga a 49-year-old female with left staghorn calculus postoperative day 1 left percutaneous nephrolithotomy  Afebrile, hemodynamically stable   Offer complaints of severe left flank pain this morning now resolved   Tolerating diet     Plan:  · Clamp PCN  · Continue pain regimen  · Ambulate and monitor diet tolerance  · Awaiting void  · Tentative discharge this afternoon versus tomorrow  · Patient will eventually require outpatient CT and planning for 2nd procedure    The patient had a left PCNL yesterday for very large staghorn calculus    I was able to fragment and extract her renal pelvis stone burden but could not get the scope to access the lower pole (attempting was concerning for collecting system injury)  There appeared to be a large significant lower pole stone burden remaining which will likely require 2nd PCNL  For this reason I have communicated with Interventional Radiology recommend keeping her PCN in place to help facilitate lower pole access at a later date  If the patient is doing well we will discharge her home (low threshold to keep her another night though) with PCN clamped and plan for follow-up CT scan in 1-2 weeks and accordingly plan for PCNL (with lower pole access)  3/9:  Paulineedwin Marin a 24-year-old female with left staghorn calculus postoperative day 2 left percutaneous nephrolithotomy  Afebrile, hemodynamically stable  Patient was seen Clinton Kruger in chair  Offer complaints of severe left flank pain this morning   She been ambulating to the bathroom this morning  Tolerating diet       Plan:  · CT chest, abdomen & pelvis scheduled for today  Await results  · Blood cultures pending   · Continue IV antibiotics  · Keep PCNU open, do not clamp  · Continue pain regimen  · Ambulate and monitor diet tolerance  Requires additional hospital stay more than 2 midnights for fever, antibiotics, awaiting cultures in image results; in addition to pain management         Output last 24 hours:  Urine: 3200mL  Nephrostomy: 95mL     3/10:Day 2:  Kervin Mesa is a 24-year-old female with left staghorn calculus postoperative day 3 left percutaneous nephrolithotomy      Interval history--patient developed migraine and hypotension overnight  Patient responded to treatment for headache and bolus  She is seen out of bed today  In good spirits  CT of the chest, abdomen and pelvis demonstrated left lower lobe pneumonia and small pleural effusion  Patient is not short of breath or experiencing chest pain    Left PCNU draining clear yellow      Blood culture x2 negative for growth     Plan:  · Monitor vitals  · Obtain CBC  · Despite findings, patient is feeling well and anxious for discharge  · Home late today or tomorrow         Output last 24 hours:  Urine: 3200mL  Nephrostomy: 95mL     Admission Orders: Date/Time/Statement:   Admission Orders (From admission, onward)     Ordered        03/09/22 1438  Inpatient Admission  Once                      Orders Placed This Encounter   Procedures    Inpatient Admission     Standing Status:   Standing     Number of Occurrences:   1     Order Specific Question:   Level of Care     Answer:   Med Surg [16]     Order Specific Question:   Estimated length of stay     Answer:   More than 2 Midnights     Order Specific Question:   Certification     Answer:   I certify that inpatient services are medically necessary for this patient for a duration of greater than two midnights  See H&P and MD Progress Notes for additional information about the patient's course of treatment       Vital Signs: /76   Pulse 97   Temp 99 2 °F (37 3 °C)   Resp 18   Ht 5' 4" (1 626 m)   Wt 78 5 kg (173 lb)   SpO2 97%   BMI 29 70 kg/m²     03/10/22 11:13:27 99 1 °F (37 3 °C) 90 18 110/66 81 94 % -- -- --         03/10/22 07:04:41 99 2 °F (37 3 °C) 98 16 114/70 85 91 % -- None (Room air) Lying   03/10/22 03:09:21 -- 99 -- 106/70 82 91 % -- -- --   03/10/22 00:25:18 -- 93 -- 108/68 81 89 % Abnormal  -- -- --   03/09/22 22:21:18 100 3 °F (37 9 °C) 92 17 100/68 -- 94 % -- None (Room air) Lying   03/09/22 1919 -- -- -- 92/62 -- -- -- -- --   03/09/22 19:12:22 99 9 °F (37 7 °C) 106 Abnormal  -- 83/49 Abnormal   60 94 % -- -- --   03/09/22 17:39:22 101 °F (38 3 °C) Abnormal  107 Abnormal  19 114/77 89 92 % -- -- --   03/09/22 16:22:59 100 1 °F (37 8 °C) 102 18 132/79 97 96 % -- -- --   03/09/22 1600 -- -- -- -- -- -- -- None (Room air) --   03/09/22 11:12:58 99 2 °F (37 3 °C) 97 18 118/76 90 97 % -- -- --   03/09/22 09:24:43 99 °F (37 2 °C) 97 -- 125/75 92 97 % -- -- -- 03/09/22 0921 -- -- -- 125/75 -- -- -- -- --   03/09/22 07:26:44 -- 96 18 115/78 90 94 % -- -- --   03/09/22 0700 98 6 °F (37 °C) 91 16 115/78 -- 94 % -- None (Room air) Sitting   03/09/22 0314 -- -- -- -- -- 91 % -- None (Room air) --   03/09/22 03:08:17 100 6 °F (38 1 °C) Abnormal  109 Abnormal  18 113/71 85 89 % Abnormal  -- -- --   03/09/22 03:07:25 100 6 °F (38 1 °C) Abnormal  107 Abnormal  18 117/69 85 89 % Abnormal  -- -- --   03/08/22 21:40:01 99 6 °F (37 6 °C) 92 16 102/65 77 97 % -- None (Room air) Lying   03/08/22 19:55:04 100 2 °F (37 9 °C) 108 Abnormal  16 93/62 72 93 % -- None (Room air) Lying   03/08/22 16:04:23 101 °F (38 3 °C) Abnormal  100 16 103/65 78 96 % -- None (Room air) Lying   03/08/22 1330 -- -- -- -- -- -- 4 -- --   03/08/22 10:54:29 99 1 °F (37 3 °C) 87 18 127/80 96 99 % -- -- --   03/08/22 07:03:55 98 3 °F (36 8 °C) 78 18 95/59 71 96 % -- -- --   03/08/22 02:58:47 98 7 °F (37 1 °C) 90 18 93/61 72 95 % -- -- --   03/07/22 22:24:28 98 7 °F (37 1 °C) 94 18 93/54 67 95 % -- -- --   03/07/22 19:47:06 98 7 °F (37 1 °C) 107 Abnormal  16 109/71 84 92 % -- -- --   03/07/22 18:21:56 -- 91 -- 111/71 84 95 % -- -- --   03/07/22 1710 -- 91 -- 125/77 93 93 % -- -- --   03/07/22 1630 97 2 °F (36 2 °C) Abnormal  96 17 112/79 91 96 % -- None (Room air) --   03/07/22 1615 -- 92 15 130/81 102 96 % -- None (Room air) --   03/07/22 1600 -- 106 Abnormal  18 135/75 100 100 % -- None (Room air) --   03/07/22 1545 97 8 °F (36 6 °C) 109 Abnormal  18 138/83 -- 100 % 4 Simple mask --   03/07/22 1124 97 2 °F (36 2 °C) Abnormal  107 Abnormal  20 128/76 -- 96 % -- None (Room air) --         Pertinent Labs/Diagnostic Test Results:   3/9 CT Chest/abd/pelvis:  1  Left-sided percutaneous nephrostomy and left ureteral stent in place with a persistent dominant left staghorn calculus  No hydronephrosis  Indistinctness and mild enhancement of the wall of the left renal pelvis noted    Cannot exclude an   inflammatory or infectious process  2   Left lower lobe pneumonia with associated small pleural effusion  3   Incidental 3 mm right apical pulmonary nodule  If the patient is of high risk, a one-year follow-up would be recommended  FL retrograde pyelogram   Final Result by Rodo Vargas MD (03/09 1204)      Fluoroscopic guidance provided for procedure guidance  Please refer to the separate procedure notes for additional details  Workstation performed: [id number]         CT chest abdomen pelvis w contrast    (Results Pending)         Results from last 7 days   Lab Units 03/09/22  0441 03/08/22  0847 03/07/22  1633   WBC Thousand/uL 13 40* 12 46* 13 76*   HEMOGLOBIN g/dL 8 8* 8 5* 10 6*   HEMATOCRIT % 28 4* 27 6* 33 0*   PLATELETS Thousands/uL 405* 367 424*         Results from last 7 days   Lab Units 03/09/22  0755 03/08/22  0449 03/07/22  1633   SODIUM mmol/L 140 137 140   POTASSIUM mmol/L 3 9 4 3 4 0   CHLORIDE mmol/L 108 104 104   CO2 mmol/L 23 22 22   ANION GAP mmol/L 9 11 14*   BUN mg/dL 15 23 23   CREATININE mg/dL 0 93 0 95 0 79   EGFR ml/min/1 73sq m 67 66 82   CALCIUM mg/dL 8 7 8 4 8 8   CALCIUM, IONIZED mmol/L  --  1 10*  --          Results from last 7 days   Lab Units 03/10/22  1111 03/10/22  0706 03/09/22 2058 03/09/22  1622 03/09/22  1104 03/09/22  0727 03/08/22 2050 03/08/22  1625 03/08/22  1051 03/08/22  0704 03/07/22  2228 03/07/22 2059   POC GLUCOSE mg/dl 179* 142* 200* 168* 168* 131 175* 201* 152* 120 260* 285*     Results from last 7 days   Lab Units 03/09/22  0755 03/08/22  0449 03/07/22  1633   GLUCOSE RANDOM mg/dL 131 144* 137       Results from last 7 days   Lab Units 03/08/22 1952   BLOOD CULTURE  No Growth at 24 hrs  No Growth at 24 hrs         Diet: cons carb diet  Mobility:   DVT Prophylaxis: scd    Medications/Pain Control:   Scheduled Medications:  aspirin, 81 mg, Oral, Daily  atorvastatin, 20 mg, Oral, HS  ciprofloxacin, 400 mg, Intravenous, Q12H  docusate sodium, 100 mg, Oral, BID  insulin glargine, 15 Units, Subcutaneous, HS  insulin lispro, 1-6 Units, Subcutaneous, TID AC  lisinopril, 5 mg, Oral, Daily  montelukast, 10 mg, Oral, QPM  oxybutynin, 5 mg, Oral, BID      Continuous IV Infusions:  lactated ringers, 50 mL/hr, Intravenous, Continuous  lactated ringers, 125 mL/hr, Intravenous, Continuous      PRN Meds:  acetaminophen, 650 mg, Oral, Q6H PRN 3/8 x 1, 3/9 x 2  albuterol, 2 puff, Inhalation, Q4H PRN 3/6 x2  HYDROmorphone, 0 5 mg, Intravenous, Q4H PRN  ketorolac, 15 mg, Intravenous, Q6H PRN 3/7 x 1, 3/8 x 3, 3/9 x 1  ondansetron, 4 mg, Intravenous, Q6H PRN  oxyCODONE, 10 mg, Oral, Q4H PRN 3/7 x 1, 3/8 x 3, 3/9 x2, 3/10 x 1  oxyCODONE, 5 mg, Oral, Q4H PRN        Network Utilization Review Department  ATTENTION: Please call with any questions or concerns to [phone] and carefully listen to the prompts so that you are directed to the right person  All voicemails are confidential   Lucas Delude all requests for admission clinical reviews, approved or denied determinations and any other requests to dedicated fax number below belonging to the campus where the patient is receiving treatment   List of dedicated fax numbers for the Facilities:  [address] DENIALS (Administrative/Medical Necessity) [phone]   [address] (Maternity/NICU/Pediatrics) [phone]   [address] [place] [address] [address] [place] [address]  [phone]   [address] [id number] [place] [address] Isabela Willingham [address] 1060 [facility]  1501 [facility] [phone]

## 2022-03-10 ENCOUNTER — TELEPHONE (OUTPATIENT)
Dept: OTHER | Facility: HOSPITAL | Age: 57
End: 2022-03-10

## 2022-03-10 VITALS
HEIGHT: 64 IN | OXYGEN SATURATION: 96 % | DIASTOLIC BLOOD PRESSURE: 85 MMHG | RESPIRATION RATE: 16 BRPM | WEIGHT: 173 LBS | HEART RATE: 108 BPM | BODY MASS INDEX: 29.53 KG/M2 | TEMPERATURE: 100.1 F | SYSTOLIC BLOOD PRESSURE: 123 MMHG

## 2022-03-10 LAB
ERYTHROCYTE [DISTWIDTH] IN BLOOD BY AUTOMATED COUNT: 14.3 % (ref 11.6–15.1)
GLUCOSE SERPL-MCNC: 139 MG/DL (ref 65–140)
GLUCOSE SERPL-MCNC: 142 MG/DL (ref 65–140)
GLUCOSE SERPL-MCNC: 179 MG/DL (ref 65–140)
HCT VFR BLD AUTO: 29.1 % (ref 34.8–46.1)
HGB BLD-MCNC: 8.9 G/DL (ref 11.5–15.4)
MCH RBC QN AUTO: 25.8 PG (ref 26.8–34.3)
MCHC RBC AUTO-ENTMCNC: 30.6 G/DL (ref 31.4–37.4)
MCV RBC AUTO: 84 FL (ref 82–98)
PLATELET # BLD AUTO: 427 THOUSANDS/UL (ref 149–390)
PMV BLD AUTO: 9.3 FL (ref 8.9–12.7)
RBC # BLD AUTO: 3.45 MILLION/UL (ref 3.81–5.12)
WBC # BLD AUTO: 13.46 THOUSAND/UL (ref 4.31–10.16)

## 2022-03-10 PROCEDURE — 99024 POSTOP FOLLOW-UP VISIT: CPT | Performed by: UROLOGY

## 2022-03-10 PROCEDURE — 82948 REAGENT STRIP/BLOOD GLUCOSE: CPT

## 2022-03-10 PROCEDURE — 85027 COMPLETE CBC AUTOMATED: CPT | Performed by: NURSE PRACTITIONER

## 2022-03-10 PROCEDURE — 99024 POSTOP FOLLOW-UP VISIT: CPT | Performed by: NURSE PRACTITIONER

## 2022-03-10 RX ORDER — CEFDINIR 300 MG/1
300 CAPSULE ORAL EVERY 12 HOURS SCHEDULED
Qty: 14 CAPSULE | Refills: 0 | Status: SHIPPED | OUTPATIENT
Start: 2022-03-10 | End: 2022-03-11 | Stop reason: SDUPTHER

## 2022-03-10 RX ADMIN — OXYBUTYNIN CHLORIDE 5 MG: 5 TABLET ORAL at 17:04

## 2022-03-10 RX ADMIN — OXYCODONE HYDROCHLORIDE 10 MG: 10 TABLET ORAL at 17:04

## 2022-03-10 RX ADMIN — OXYCODONE HYDROCHLORIDE 10 MG: 10 TABLET ORAL at 09:16

## 2022-03-10 RX ADMIN — ASPIRIN 81 MG: 81 TABLET, CHEWABLE ORAL at 09:17

## 2022-03-10 RX ADMIN — ACETAMINOPHEN 650 MG: 325 TABLET ORAL at 17:04

## 2022-03-10 RX ADMIN — DOCUSATE SODIUM 100 MG: 100 CAPSULE ORAL at 17:04

## 2022-03-10 RX ADMIN — DOCUSATE SODIUM 100 MG: 100 CAPSULE ORAL at 09:17

## 2022-03-10 RX ADMIN — OXYBUTYNIN CHLORIDE 5 MG: 5 TABLET ORAL at 09:17

## 2022-03-10 RX ADMIN — MONTELUKAST 10 MG: 10 TABLET, FILM COATED ORAL at 17:04

## 2022-03-10 RX ADMIN — INSULIN LISPRO 1 UNITS: 100 INJECTION, SOLUTION INTRAVENOUS; SUBCUTANEOUS at 11:50

## 2022-03-10 NOTE — QUICK NOTE
Nursing staff notified on-call urology provider regarding patient's blood pressure  Blood pressure seems to be stable from baseline  However in the setting of fever and tachycardia with low B,  will move forward with IV bolus of 1 L over 4 hours  With adjustment antibiotics  Patient placed on ciprofloxacin IV postoperatively  Fever curve improving  Will adjust to ceftriaxone 1 g Q 24 hours based on prior urine cultures  Discussed with  attending  Will continue to monitor      Fiona Payne PA-C'

## 2022-03-10 NOTE — DISCHARGE INSTRUCTIONS
Ms  Jorge Saldana,  Your admitted electively for open stone removal of known substantial left kidney stones  The procedure went well but you developed fever postoperatively  You were started on more aggressive IV fluids and antibiotics  CT demonstrated residual left stone  However, also had incidental finding of small left lower lobe lung fluid suggestive of pneumonia  You had more than 24 hours of no fever  Your migraine and low blood pressure were resolved  Repeat blood count the afternoon of postoperative day 3 a showed mild elevation in your white blood cell count but not higher than prior  This is certainly expected given the recent finding of pneumonia  You are otherwise deemed stable for discharge to your home  We want you to complete your antibiotics as prescribed for 7 days, even if you are feeling better  You stated you had some pain medication prescribed to you last week  You can continue pain medication as needed  You can shower  There is no indication for bed rest, but do not engage in heavy lifting or over vigorous activity  The drain on your back, percutaneous nephroureteral stent, was capped  Please keep this in place for 1 week  Contact our office should you experience high fever, chills, persistent flank, abdominal pain, nausea/vomiting or significant blood in your urine with inability to pass urine  If you experience shortness of breath, report to the nearest emergency room  You may also want to be in touch with your PCP regarding your recent pneumonia if you feel as if you are making slow recovery  Otherwise, our office staff will contact you for an appointment re-evaluation and to discuss further operative procedure with Dr Latisha Muhammad for the remaining stones  On behalf of Dr Latisha Muhammad and [facility], we wish you a speedy recovery  It is a privilege to provide your urologic care      Blas,  Martha Aviles, [address] for Urology  [facility]-based Advanced practice clinician    Percutaneous Nephrolithotomy   WHAT YOU NEED TO KNOW:   Percutaneous nephrolithotomy is surgery to remove kidney stones  DISCHARGE INSTRUCTIONS:   Call your local emergency number (911 in the [address]) if:   · You feel lightheaded, short of breath, or have chest pain  · You cough up blood  · You have trouble thinking clearly  Seek care immediately if:   · You have severe vomiting  · You urinate bright red blood  · Your arm or leg feels warm, tender, and painful  It may look swollen and red  Contact your doctor or urologist if:   · You have a fever  · You cannot urinate  · You have questions or concerns about your condition or care  Medicines: You may need any of the following:  · Medicines  may be given to help pass stone pieces or prevent more kidney stones from forming  Prevention medicine will be based on the kind of kidney stones you have  · Antibiotics  help prevent or treat an infection caused by bacteria  · Prescription pain medicine  may be given  Ask your healthcare provider how to take this medicine safely  Some prescription pain medicines contain acetaminophen  Do not take other medicines that contain acetaminophen without talking to your healthcare provider  Too much acetaminophen may cause liver damage  Prescription pain medicine may cause constipation  Ask your healthcare provider how to prevent or treat constipation  · Take your medicine as directed  Contact your healthcare provider if you think your medicine is not helping or if you have side effects  Tell him or her if you are allergic to any medicine  Keep a list of the medicines, vitamins, and herbs you take  Include the amounts, and when and why you take them  Bring the list or the pill bottles to follow-up visits  Carry your medicine list with you in case of an emergency  Care for the surgery area: You may need to wash the wound with soap and water   Dry the area and put on new, clean bandages as directed  Change your bandages when they get wet or dirty  You may have thin strips of surgical tape on your incision  Keep them clean and dry  They will fall off by themselves after several days  Do not pull them off  Self-care:   · Rest as needed  You may feel like resting more after your surgery  Slowly start to do more each day  Rest when you feel it is needed  · Drink more liquids  This helps flush any remaining small pieces of stone  Fluids can also help prevent stones from forming again  Limit the amount of caffeine you drink  Caffeine may be found in coffee, tea, soda, and sports drinks  Ask how much liquid to drink each day and which liquids are best for you  · Apply heat to decrease pain and soreness  Apply heat on your lower back for 20 to 30 minutes every 2 hours for as many days as directed  · Strain your urine every time you go to the bathroom  Urinate through a strainer or a piece of thin cloth to catch the stone pieces  Take the pieces to your healthcare providers so they can be sent to the lab for tests  This will help your healthcare provider or urologist plan the best treatment for you  · Ask if you need to make changes in the foods you eat  You may need to limit certain foods  Examples include nuts, chocolate, coffee, and certain green leafy vegetables  You may also need to limit meat and salt  Follow up with your doctor or urologist as directed:  Write down your questions so you remember to ask them during your visits  © [employer] 2022 Information is for End User's use only and may not be sold, redistributed or otherwise used for commercial purposes  All illustrations and images included in CareNotes® are the copyrighted property of A D A M , Inc  or [facility] Christo Donovan   The above information is an  only  It is not intended as medical advice for individual conditions or treatments   Talk to your doctor, nurse or pharmacist before following any medical regimen to see if it is safe and effective for you

## 2022-03-10 NOTE — NURSING NOTE
This nurse informed by PCA that patient automatic BP was 83/49, this nurse obtained manual BP of 92/62, patient is asymptomatic at this time but did mention that earlier today prior to dinner she felt dizzy when going to the bathroom but it subsided quickly  Patient was able to eat 100% of her dinner meal and is resting comfortably in bed  Urology on call made aware of BP as well

## 2022-03-10 NOTE — PROGRESS NOTES
Progress Note - Anne Stringer 60 y o  female MRN: [id number]     Unit/Bed#: S -01 Encounter: [id number]        Assessment:  Anne Stringer is a 40-year-old female with left staghorn calculus postoperative day 3 left percutaneous nephrolithotomy     Interval history--patient developed migraine and hypotension overnight  No further bouts of fever, but maintains temperature of approximately 99°  Patient responded well to IV fluids and treatment for headache  She is seen out of bed today  In good spirits  CT of the chest, abdomen and pelvis demonstrated left lower lobe pneumonia and small pleural effusion  Patient is not short of breath or experiencing chest pain  Left PCNU draining clear yellow  Blood culture x2 negative for growth    Plan:  · Monitor vitals  · Obtain CBC  · Despite findings, patient is feeling well and anxious for discharge  · Home late today F CBC is same or better with PCN capped  Output last 24 hours:  Urine: 2,475mL/400ml dayshift  Nephrostomy: 175mL     Subjective:    Review of Systems   Constitutional: Negative for chills and fever  HENT: Negative for congestion, sore throat and trouble swallowing  Respiratory: Negative for cough, chest tightness and shortness of breath  Cardiovascular: Negative for chest pain, palpitations and leg swelling  Gastrointestinal: Negative for abdominal pain, diarrhea, nausea and vomiting  Genitourinary: Positive for flank pain  Negative for dysuria  Musculoskeletal: Negative for back pain, joint swelling and neck stiffness  Neurological: Negative for dizziness, weakness and headaches  Psychiatric/Behavioral: Negative for agitation and suicidal ideas  The patient is nervous/anxious  Objective:      Vitals: Blood pressure 127/80, pulse 87, temperature 99 1 °F (37 3 °C), resp  rate 18, height 5' 4" (1 626 m), weight 78 5 kg (173 lb), SpO2 99 %  ,Body mass index is 29 7 kg/m²         Intake/Output Summary (Last 24 hours) at 3/8/2022 1442  Last data filed at 3/8/2022 0458      Gross per 24 hour   Intake 3650 ml   Output 2050 ml   Net 1600 ml         Physical Exam:   General appearance: alert and oriented, in no acute distress  Head: Normocephalic, without obvious abnormality, atraumatic  Neck: no adenopathy, no carotid bruit, no JVD, supple, symmetrical, trachea midline and thyroid not enlarged, symmetric, no tenderness/mass/nodules  Lungs: clear to auscultation bilaterally  Heart: regular rate and rhythm, S1, S2 normal, no murmur, click, rub or gallop  Abdomen: normal findings: symmetric and abnormal findings:  left flank pain  Neurologic: Grossly normal            Invasive Devices  Report           Peripheral Intravenous Line                     Peripheral IV 03/07/22 Dorsal (posterior); Right Hand 1 day      Peripheral IV 03/07/22 Left Hand 1 day                   Drain                     Nephrostomy Left 1 10 2 Fr  <1 day      Ureteral Drain/Stent Left ureter 6 Fr  <1 day                        Lab Results   Component Value Date     WBC 12 46 (H) 03/08/2022     HGB 8 5 (L) 03/08/2022     HCT 27 6 (L) 03/08/2022     MCV 85 03/08/2022      03/08/2022            Lab Results   Component Value Date     SODIUM 137 03/08/2022     K 4 3 03/08/2022      03/08/2022     CO2 22 03/08/2022     BUN 23 03/08/2022     CREATININE 0 95 03/08/2022     GLUC 144 (H) 03/08/2022     CALCIUM 8 4 03/08/2022            Lab, Imaging and other studies: I have personally reviewed pertinent reports

## 2022-03-10 NOTE — TELEPHONE ENCOUNTER
Karla Cho is a 69-year-old female with left staghorn calculus status post PCNL by Dr Marc García  Left PCNU is capped for trial of 1 week  Please contact patient for re-evaluation  She will need to discuss with Dr Marc García 2nd procedure  Please discuss with him fitting patient in his scheduled for re-evaluation  Thank you

## 2022-03-10 NOTE — DISCHARGE SUMMARY
DISCHARGE SUMMARY    Patient Name: Ciara Stanton  Patient MRN: [id number]  Admitting Provider: Rajani Jaime MD  Discharging Provider: Rajani Jaime MD  Primary Care Physician at Discharge: Willem Martin [phone]   Admission Date: 3/7/2022       Discharge Date: 03/10/22      Admission Diagnosis   Staghorn renal calculus [N20 0]    Discharge Diagnoses  Patient Active Problem List   Diagnosis    Benign essential hypertension    Hyperlipidemia    Uncontrolled type 2 diabetes mellitus with hyperglycemia ([facility] )    Kidney stone    Mild persistent asthma    Allergic rhinitis    Anemia    Staghorn renal calculus    Incomplete emptying of bladder    Restrictive airway disease         Hospital Course  Patient reported to ER with chief complaint flank pain  CT scan confirmed +Nephrolithiasis  After an explanation of risk, benefits and potential complications  Patient was agreeable and furnished informed consent for percutaneous nephrolithotomy, PCN and ureteral stent placement  Please refer to operative  report dictated  This procedure went well without adverse events or immediate complications  Patient developed fever the night of postoperative day 1, aggressive IV fluids and antibiotics initiated  Left PCNU was open to gravity  She remained an additional 24 hours watching temperature curve and awaiting cultures  Blood cultures were negative x2; as well as urine culture  CT of the chest, abdomen and pelvis demonstrated favorable position of nephroureteral stent, no evidence of hydronephrosis, but residual left lower pole stone burden; in addition to left lower lobe lung pneumonia and small pleural effusion  Patient continued incentive spirometry, ambulation and regained adequate pain control  Patient tolerated diet  She is very is a brief episode of a migraine and transient hypotension, all resolved within a matter of several hours    Repeat CBC demonstrated mild leukocytosis of 13 K but no higher  Hemoglobin and renal function were within normal limits  Patient was afebrile, normotensive, eating, ambulating, adequately pain controlled and voiding on postoperative day 3  Nephroureteral tube was clamped and patient tolerated this well  She was deemed  stable late afternoon postoperative day 3 with 7 days of cefdinir  Patient verbalized understanding of all discharge instructions including:  Medications, diet, activity limitations/restrictions, signs and symptoms to report and follow-up  Patient will be contacted within 1 week to re-evaluate clamp trial and to discuss scheduling of next stage in definitive stone treatment with  attending, Dr Tez Green  Medications  Current Discharge Medication List      CONTINUE these medications which have NOT CHANGED    Details   albuterol (ProAir HFA) 90 mcg/act inhaler Inhale 1-2 puffs every 4 (four) hours as needed for wheezing  Qty: 18 g, Refills: 3    Comments: Substitution to a formulary equivalent within the same pharmaceutical class is authorized    Associated Diagnoses: Mild persistent asthma, unspecified whether complicated      aspirin 81 MG tablet Take 1 tablet by mouth daily      atorvastatin (LIPITOR) 40 mg tablet Take 0 5 tablets (20 mg total) by mouth daily at bedtime  Qty: 90 tablet, Refills: 1    Associated Diagnoses: Mixed hyperlipidemia      cholecalciferol (VITAMIN D3) 1,000 units tablet Take 1,000 Units by mouth daily        cyanocobalamin (VITAMIN B-12) 500 MCG tablet Take 500 mcg by mouth daily      docusate sodium (COLACE) 100 mg capsule Take 1 capsule (100 mg total) by mouth 2 (two) times a day  Qty: 30 capsule, Refills: 1    Associated Diagnoses: Kidney stone      Empagliflozin 25 MG TABS Take 1 tablet (25 mg total) by mouth every morning For diabetes  Qty: 90 tablet, Refills: 1    Associated Diagnoses: Uncontrolled type 2 diabetes mellitus with hyperglycemia (HCC)      ferrous sulfate 324 (65 Fe) mg Take 1 tablet (324 mg total) by mouth every other day    Associated Diagnoses: Iron deficiency anemia secondary to inadequate dietary iron intake      glucose blood (OneTouch Ultra) test strip Use 1 each 3 (three) times a day  Qty: 300 each, Refills: 3    Associated Diagnoses: Uncontrolled type 2 diabetes mellitus with hyperglycemia (HCC)      HYDROcodone-acetaminophen (Norco) 5-325 mg per tablet Take 1 tablet by mouth every 6 (six) hours as needed for pain Max Daily Amount: 4 tablets  Qty: 10 tablet, Refills: 0    Associated Diagnoses: Kidney stone      insulin glargine (Lantus SoloStar) 100 units/mL injection pen Inject 20 Units under the skin daily at bedtime Inject 16 units daily  Qty: 30 mL, Refills: 1    Associated Diagnoses: Uncontrolled type 2 diabetes mellitus with hyperglycemia (HCC)      lisinopril (ZESTRIL) 5 mg tablet Take 1 tablet (5 mg total) by mouth daily  Qty: 90 tablet, Refills: 3    Associated Diagnoses: Benign essential hypertension      metFORMIN (GLUCOPHAGE-XR) 500 mg 24 hr tablet Take 2 tablets (1,000 mg total) by mouth 2 (two) times a day with meals  Qty: 360 tablet, Refills: 3    Associated Diagnoses: Uncontrolled type 2 diabetes mellitus with hyperglycemia (HCC)      montelukast (SINGULAIR) 10 mg tablet Take 1 tablet (10 mg total) by mouth every evening  Qty: 90 tablet, Refills: 3    Associated Diagnoses:  Allergic rhinitis, unspecified seasonality, unspecified trigger      oxybutynin (DITROPAN) 5 mg tablet Take 1 tablet (5 mg total) by mouth 3 (three) times a day as needed (stent pain)  Qty: 30 tablet, Refills: 1    Associated Diagnoses: Kidney stone      Dulaglutide (Trulicity) 2 68 AT/5 1GP SOPN Inject 0 5 mL (0 75 mg total) under the skin every 7 days  Qty: 2 mL, Refills: 0    Associated Diagnoses: Uncontrolled type 2 diabetes mellitus with hyperglycemia (HCC)      Insulin Pen Needle (Pen Needles) 32G X 4 MM MISC Use daily at bedtime  Qty: 100 each, Refills: 3    Associated Diagnoses: Uncontrolled type 2 diabetes mellitus with hyperglycemia (HCC)      Lancets (OneTouch Delica Plus [id number]) MISC Test TID  Qty: 300 each, Refills: 3    Associated Diagnoses: Uncontrolled type 2 diabetes mellitus with hyperglycemia (HCC)      sodium chloride, PF, 0 9 % 10 mL by Intracatheter route daily Intracatheter flushing daily  Qty: 300 mL, Refills: 2    Associated Diagnoses: Staghorn renal calculus           Current Discharge Medication List      START taking these medications    Details   cefdinir (OMNICEF) 300 mg capsule Take 1 capsule (300 mg total) by mouth every 12 (twelve) hours for 7 days  Qty: 14 capsule, Refills: 0    Associated Diagnoses: Staghorn renal calculus               Allergies  Allergies   Allergen Reactions    Seasonal Ic  [Cholestatin] Allergic Rhinitis       Outpatient Follow-Up  Future Appointments   Date Time Provider Karthik Maii   3/22/2022 10:00 AM Jessica Vieyra MD [facility]   3/22/2022 11:00 AM [facility] Susan Fly [facility]   4/14/2022 11:00 AM Tracy Wiley Pharmacist [facility]   6/8/2022 12:15 PM Adams Coleman MD [facility]         Active Issues Requiring Follow-Up  Clamp trial   Second procedure    Test Results Pending at Discharge  Pending Labs     Order Current Status    Stone analysis In process    Blood culture Preliminary result    Blood culture Preliminary result            Discharge Disposition  home    Treatments   IV hydration and antibiotics: Cipro and ceftriaxone    Consults   none    Procedures       Operative Procedures Performed  Procedure(s):  NEPHROLITHOTOMY  PERCUTANEOUS (PCNL)    Pertinent Test Results   Lab Results   Component Value Date    WBC 13 46 (H) 03/10/2022    HGB 8 9 (L) 03/10/2022    HCT 29 1 (L) 03/10/2022    MCV 84 03/10/2022     (H) 03/10/2022     Lab Results   Component Value Date    SODIUM 140 03/09/2022    K 3 9 03/09/2022     03/09/2022    CO2 23 03/09/2022    BUN 15 03/09/2022    CREATININE 0 93 03/09/2022    GLUC 131 03/09/2022    CALCIUM 8 7 03/09/2022         Physical Exam at Discharge  Condition of Patient on Discharge: stable  /85   Pulse (!) 108   Temp 100 1 °F (37 8 °C)   Resp 16   Ht 5' 4" (1 626 m)   Wt 78 5 kg (173 lb)   SpO2 96%   BMI 29 70 kg/m²   General appearance: alert and oriented, in no acute distress, appears stated age, cooperative and no distress  Head: Normocephalic, without obvious abnormality, atraumatic  Neck: no adenopathy, no carotid bruit, no JVD, supple, symmetrical, trachea midline and thyroid not enlarged, symmetric, no tenderness/mass/nodules  Lungs: diminished breath sounds  Heart: regular rate and rhythm, S1, S2 normal, no murmur, click, rub or gallop  Abdomen: soft, non-tender; bowel sounds normal; no masses,  no organomegaly  Rectal: normal tone, no masses or tenderness  Pulses: 2+ and symmetric  Neurologic: Grossly normal  Left PCNU clamped        I/O last 3 completed shifts: In: 8269 [P O :240; I V :2000; IV Piggyback:200]  Out: 7059 [Urine:7059]  I/O this shift:   In: 716 [P O :716]  Out:  [address], CRNP

## 2022-03-10 NOTE — UTILIZATION REVIEW
Inpatient Admission Authorization Request   NOTIFICATION OF INPATIENT ADMISSION/INPATIENT AUTHORIZATION REQUEST   SERVICING FACILITY:   [address]  Tax ID: [id number]  NPI: [id number]  Place of Service: [facility]patient [facility]  [address]  Place of Service Code: 24     ATTENDING PROVIDER:  Attending Name and NPI#: Les Chapman Md [[id number]]  Address: [address]  Phone: [phone]     UTILIZATION REVIEW CONTACT:  Robb Najera Utilization   Network Utilization Review Department  Phone: [phone]  Fax: [phone]  Email: [email or web address]     PHYSICIAN ADVISORY SERVICES:  FOR CWFU-YK-MAYS REVIEW - MEDICAL NECESSITY DENIAL  Phone: [phone]  Fax: [phone]  Email: [email or web address]     TYPE OF REQUEST:  Inpatient Status     ADMISSION INFORMATION:  ADMISSION DATE/TIME: 3/9/22  2:38 PM  PATIENT DIAGNOSIS CODE/DESCRIPTION:  Staghorn renal calculus [N20 0]  DISCHARGE DATE/TIME: No discharge date for patient encounter  IMPORTANT INFORMATION:  Please contact the Robb Najera directly with any questions or concerns regarding this request  Department voicemails are confidential     Send requests for admission clinical reviews, concurrent reviews, approvals, and administrative denials due to lack of clinical to fax [phone]

## 2022-03-11 ENCOUNTER — TELEPHONE (OUTPATIENT)
Dept: OTHER | Facility: HOSPITAL | Age: 57
End: 2022-03-11

## 2022-03-11 ENCOUNTER — TRANSITIONAL CARE MANAGEMENT (OUTPATIENT)
Dept: INTERNAL MEDICINE CLINIC | Facility: CLINIC | Age: 57
End: 2022-03-11

## 2022-03-11 DIAGNOSIS — N20.0 STAGHORN RENAL CALCULUS: ICD-10-CM

## 2022-03-11 RX ORDER — CEFDINIR 300 MG/1
300 CAPSULE ORAL EVERY 12 HOURS SCHEDULED
Qty: 14 CAPSULE | Refills: 0 | Status: SHIPPED | OUTPATIENT
Start: 2022-03-11 | End: 2022-03-18

## 2022-03-11 NOTE — TELEPHONE ENCOUNTER
Nurse contacted me regarding script that was to be sent to pharmacy  Looks like it was send will resend

## 2022-03-11 NOTE — UTILIZATION REVIEW
Notification of Discharge   This is a Notification of Discharge from our facility [address]  Please be advised that this patient has been discharge from our facility  Below you will find the admission and discharge date and time including the patients disposition  UTILIZATION REVIEW CONTACT:  Rufino More MA  Utilization   Network Utilization Review Department  Phone: [phone] x carefully listen to the prompts  All voicemails are confidential   Email: [email or web address]     PHYSICIAN ADVISORY SERVICES:  FOR QTWV-YN-ATMG REVIEW - MEDICAL NECESSITY DENIAL  Phone: [phone]  Fax: [phone]  Email: [email or web address]     PRESENTATION DATE: 3/7/2022 10:59 AM  OBERVATION ADMISSION DATE:   INPATIENT ADMISSION DATE: 3/9/22  2:38 PM   DISCHARGE DATE: 3/10/2022  6:58 PM  DISPOSITION: Home/Self Care Home/Self Care      IMPORTANT INFORMATION:  Send all requests for admission clinical reviews, approved or denied determinations and any other requests to dedicated fax number below belonging to the campus where the patient is receiving treatment   List of dedicated fax numbers:  [address] DENIALS (Administrative/Medical Necessity) [phone]   [address] (Maternity/NICU/Pediatrics) [phone]   [facility] [phone]   [address] [facility] [phone]   [address] [place] [address] [phone]   2000 [facility] [address] [place] [address],[address] [place] [address] [phone]   [facility]  [phone]   [address] [place] [address], [facility]  [address]01 [facility] 1000 [facility] [phone]

## 2022-03-11 NOTE — TELEPHONE ENCOUNTER
Please assist with timeframe and scheduling  Dr Hedy Arellano does not have availability sometime in May

## 2022-03-14 ENCOUNTER — TELEPHONE (OUTPATIENT)
Dept: OTHER | Facility: OTHER | Age: 57
End: 2022-03-14

## 2022-03-14 LAB
BACTERIA BLD CULT: NORMAL
BACTERIA BLD CULT: NORMAL
COLOR STONE: NORMAL
COM MFR STONE: 20 %
COMMENT-STONE3: NORMAL
COMPOSITION: NORMAL
LABORATORY COMMENT REPORT: NORMAL
PHOTO: NORMAL
SIZE STONE: NORMAL MM
SPEC SOURCE SUBJ: NORMAL
STONE ANALYSIS-IMP: NORMAL
STONE ANALYSIS-IMP: NORMAL
URATE MFR STONE: 80 %
WT STONE: 155 MG

## 2022-03-14 NOTE — TELEPHONE ENCOUNTER
Andrea Aparicio called in stating she is scheduling pt for a one month nephrostomy drain check on 4/1/2022 and she has an appt with Dr Fabián Fuentes on 3/22/2022  She is requesting a call back to discuss this  If she's not available, please call the pt

## 2022-03-15 NOTE — TELEPHONE ENCOUNTER
Due to provider availability, patient's apt on 3/22 was rescheduled to 3/28  I spoke to pt and she is very concerned about waiting another week to be seen  She asked that I make a note about her concerns  She wants Dr Krystina Butler made aware of the new apt  So he can review and see if appropriate

## 2022-03-15 NOTE — TELEPHONE ENCOUNTER
Spoke with patient and explained Dr Rogena Spatz is aware he closed his hours on 3/22/22 for an emergency surgery and has opened his hours to accommodate these patients on 3/28/22  Patient states she is having problems with leaking nephrostomy  Suggested she call IR as they manage nephrostomy tube    Patient agrees to f/u with Dr Rogena Spatz on 3/28/22

## 2022-03-22 ENCOUNTER — OFFICE VISIT (OUTPATIENT)
Dept: INTERNAL MEDICINE CLINIC | Facility: CLINIC | Age: 57
End: 2022-03-22
Payer: MEDICARE

## 2022-03-22 VITALS
RESPIRATION RATE: 16 BRPM | OXYGEN SATURATION: 98 % | BODY MASS INDEX: 29.98 KG/M2 | HEART RATE: 104 BPM | TEMPERATURE: 96.2 F | SYSTOLIC BLOOD PRESSURE: 110 MMHG | WEIGHT: 175.6 LBS | DIASTOLIC BLOOD PRESSURE: 76 MMHG | HEIGHT: 64 IN

## 2022-03-22 DIAGNOSIS — E11.65 UNCONTROLLED TYPE 2 DIABETES MELLITUS WITH HYPERGLYCEMIA (HCC): ICD-10-CM

## 2022-03-22 DIAGNOSIS — N20.0 STAGHORN RENAL CALCULUS: Primary | ICD-10-CM

## 2022-03-22 PROCEDURE — 92250 FUNDUS PHOTOGRAPHY W/I&R: CPT | Performed by: INTERNAL MEDICINE

## 2022-03-22 PROCEDURE — 99495 TRANSJ CARE MGMT MOD F2F 14D: CPT | Performed by: INTERNAL MEDICINE

## 2022-03-22 NOTE — PROGRESS NOTES
Assessment/Plan:   I d/w her that the drainage from the incision site is likely just serous fluid   It is not urine  The incision appears clean  She will f/u with urology and IR as scheduled       Problem List Items Addressed This Visit        Endocrine    Uncontrolled type 2 diabetes mellitus with hyperglycemia ([address] )    Relevant Orders    IRIS Diabetic eye exam       Genitourinary    Staghorn renal calculus - Primary           Subjective:     Patient ID: Sarah Rees is a 60 y o  female  HPI  Left staghorn calculus s/p left PCNU placed 3/1   PCNL performed on 3/7  Still with significant lower stone burden so PCNU retained  She will have another PCNL scheduled  She stayed in the hospital for 3 days this time due to a fever post op  CT showed  "IMPRESSION:     1   Left-sided percutaneous nephrostomy and left ureteral stent in place with a persistent dominant left staghorn calculus  No hydronephrosis  Indistinctness and mild enhancement of the wall of the left renal pelvis noted  Cannot exclude an   inflammatory or infectious process  2   Left lower lobe pneumonia with associated small pleural effusion  3   Incidental 3 mm right apical pulmonary nodule  If the patient is of high risk, a one-year follow-up would be recommended "  She was placed on cefdinir  Blood cultures negative  PCNU capped  She denies having a fever and just completed the cefdinir  She is concerned about the yellow drainage worried it is urine from the incision site that can be significant at times that it leaks through the gauze and her shirt  She has tried attaching a bag to the PCNU and it drains clear urine  She is freely voiding    Review of Systems   Constitutional: Positive for chills  Negative for fever  Respiratory: Positive for cough and shortness of breath  Cardiovascular: Negative for chest pain and palpitations  Gastrointestinal: Negative for abdominal pain, constipation and diarrhea     Genitourinary: Negative for difficulty urinating and dysuria  Objective:     Physical Exam  Constitutional:       General: She is not in acute distress  Appearance: She is not ill-appearing, toxic-appearing or diaphoretic  Cardiovascular:      Heart sounds: Normal heart sounds  No murmur heard  Pulmonary:      Effort: No respiratory distress  Breath sounds: Normal breath sounds  Abdominal:      Comments: Left flank: incision site with mild erythema, no drainage, induration   Musculoskeletal:      Right lower leg: No edema  Left lower leg: No edema  Psychiatric:         Mood and Affect: Mood normal          Behavior: Behavior normal            Vitals:    03/22/22 1103   BP: 110/76   Pulse: 104   Resp: 16   Temp: (!) 96 2 °F (35 7 °C)   SpO2: 98%   Weight: 79 7 kg (175 lb 9 6 oz)   Height: 5' 4" (1 626 m)       Transitional Care Management Review:  León Mosqueda is a 60 y o  female here for TCM follow up  During the TCM phone call patient stated:    TCM Call (since 2/19/2022)     Date and time call was made  3/11/2022  7:46 AM    Hospital care reviewed  Records not available        Patient was hospitialized at  [address] [place] [address]        Date of Admission  03/07/22    Date of discharge  03/10/22    Diagnosis  Staghorn renal calculus    Disposition  Home    Were the patients medications reviewed and updated  No    Current Symptoms  None      TCM Call (since 2/19/2022)     Should patient be enrolled in anticoag monitoring? No    Scheduled for follow up?   Yes    Did you obtain your prescribed medications  Yes    Do you need help managing your prescriptions or medications  No    Is transportation to your appointment needed  No    I have advised the patient to call PCP with any new or worsening symptoms  Karen Roper MA    Are you recieving any outpatient services  No    Are you recieving home care services  No    Have you fallen in the last 12 months  No    Interperter language line needed  No Ej Ramesh MD

## 2022-03-24 LAB
LEFT EYE DIABETIC RETINOPATHY: NORMAL
LEFT EYE IMAGE QUALITY: NORMAL
LEFT EYE MACULAR EDEMA: NORMAL
LEFT EYE OTHER RETINOPATHY: NORMAL
RIGHT EYE DIABETIC RETINOPATHY: NORMAL
RIGHT EYE IMAGE QUALITY: NORMAL
RIGHT EYE MACULAR EDEMA: NORMAL
RIGHT EYE OTHER RETINOPATHY: NORMAL
SEVERITY (EYE EXAM): NORMAL

## 2022-03-28 ENCOUNTER — OFFICE VISIT (OUTPATIENT)
Dept: UROLOGY | Facility: CLINIC | Age: 57
End: 2022-03-28

## 2022-03-28 VITALS
WEIGHT: 175 LBS | HEIGHT: 64 IN | OXYGEN SATURATION: 99 % | RESPIRATION RATE: 16 BRPM | SYSTOLIC BLOOD PRESSURE: 128 MMHG | HEART RATE: 100 BPM | BODY MASS INDEX: 29.88 KG/M2 | DIASTOLIC BLOOD PRESSURE: 70 MMHG

## 2022-03-28 DIAGNOSIS — N20.0 STAGHORN RENAL CALCULUS: Primary | ICD-10-CM

## 2022-03-28 DIAGNOSIS — N20.0 URIC ACID NEPHROLITHIASIS: ICD-10-CM

## 2022-03-28 PROCEDURE — 99024 POSTOP FOLLOW-UP VISIT: CPT | Performed by: UROLOGY

## 2022-03-28 RX ORDER — POTASSIUM CITRATE 10 MEQ/1
10 TABLET, EXTENDED RELEASE ORAL 2 TIMES DAILY
Qty: 120 TABLET | Refills: 6 | Status: SHIPPED | OUTPATIENT
Start: 2022-03-28

## 2022-03-28 RX ORDER — ACETAMINOPHEN 325 MG/1
975 TABLET ORAL ONCE
Status: CANCELLED | OUTPATIENT
Start: 2022-03-28 | End: 2022-03-28

## 2022-03-28 NOTE — H&P (VIEW-ONLY)
Assessment/Plan:    Uric acid nephrolithiasis  She had uric acid staghorn likely secondary to her diabetes which is now better controlled and also BMI  We discussed interventions including drinking lemon juice and taking potassium citrate pills in effort to prevent recurrence  I think it is unlikely she can clear her current stone burden with medical therapy so will still plan to perform 2nd PCNL to treat residual stone  Patient voiced understanding and agreement    Staghorn renal calculus  The patient had a very large left renal staghorn burden and we able to treat a large percentage of it but could not reach the lower pole stones with prior access  Plan to attempt a 2nd PCNL with access hopefully obtained through the lower pole to facilitate treatment of residual stone  She still has PCN in place which is capped and should help intervention Radiology perform nephrostogram to aid new access  Patient has already undergone surgery but risks of PCNL were briefly rediscussed including risk for bleeding, infection, residual stone, harm to the kidney requiring prolonged stent or PCN tube placement  Consent was NOT obtained today  Subjective:      Patient ID: Irving Salgado is a 60 y o  female      HPI  Irving Salgado is a 60 y o  female patient with a left staghorn uric acid calculus      The patient had Right-sided flank pain and a  E coli urine tract infection treated with Keflex   A CT non con was done showing LEFT staghorn occupying interpolar and lower poles with upper pole cystic lesion possibly representing dilated calyx   The radiology read was also slightly concerning for possible XGP kidney based on findings of stranding and perirenal lymphadenopathy      Patient seen by Urology in follow up and denied any pain on the left side   She denies any history of recurrent infections or pyelonephritis on the left side      She has a history of recurrent nephrolithiasis and has had multiple lithotripsies surgeries in the past all before 2016  Ricardo Gutierres were apparently done at [facility] by Dr Tisha Steward I cannot see operative notes      Because of CT scan findings concerning for XGP kidney a repeat CT scan with contrast was obtained which was not as concerning in appearance  Elected for left PCNL  Left PCNU placed 3/1/22 with IR and PCNL performed 3/7/22, however could not access large lower pole stone burden  PCN was capped but kept in place to facilitate future PCNU placement  Patient had a longer hospital stay because of fever on postop day 1 with negative cultures but possible pneumonia so sent out on antibiotics  She now returns in follow-up  Overall doing well  Sometimes has drainage around PCN tube which has been capped  Denies fevers  Stone analysis showed 80% uric acid, 20% CaOx Monohydrate      Patient reports some difficulty voiding in the mornings  Wanetta Spinner over the she feels like she is emptying better   PVR 17 cc      She has a history of hysterectomy 20 years ago for severe endometriosis associated with bladder involvement/ injury at the time that required urological repair      Patient's A1c was 13%, now down to 8%        Past Surgical History:   Procedure Laterality Date     SECTION      FL RETROGRADE PYELOGRAM  3/7/2022    HYSTERECTOMY      age 37 per MRS    IR NEPHROURETERAL ACCESS FOR UROLOGY PCNL  3/1/2022    OOPHORECTOMY Bilateral     age 37 per MRS, with hysterectomy    HI PERCUT REMV KID STONE,2+ CM Left 3/7/2022    Procedure: NEPHROLITHOTOMY  PERCUTANEOUS (PCNL);   Surgeon: Say Gutiérrez MD;  [facility];  Service: Urology    TOTAL ABDOMINAL HYSTERECTOMY W/ BILATERAL SALPINGOOPHORECTOMY          Past Medical History:   Diagnosis Date    Abnormal LFTs (liver function tests)     last assessed 14    Anemia     last assessed 14    Anesthesia complication     Restrictive airway diesease- has asthma attack when extubated    Asthma  Diabetes mellitus ([facility] )     Diabetes mellitus due to underlying condition with hyperglycemia, without long-term current use of insulin ([place] [address] ) 9/11/2020    Elevated blood pressure reading     last assessed 06/17/13    GERD (gastroesophageal reflux disease)     Hematuria     last assessed 09/11/14    Hypercalcemia     last assessed 09/11/14    Hyperlipidemia     Kidney stone     Leukocytosis     last assessed 09/11/124    Microalbuminuria     last assessed 09/11/14    Restrictive lung disease     Type 2 diabetes mellitus with hyperglycemia ([place] [address] ) 7/2/2013    Type 2 diabetes mellitus without complication, without long-term current use of insulin ([place] [address] ) 9/11/2020             Review of Systems   Constitutional: Negative for chills and fever  HENT: Negative for ear pain and sore throat  Eyes: Negative for pain and visual disturbance  Respiratory: Negative for cough and shortness of breath  Cardiovascular: Negative for chest pain and palpitations  Gastrointestinal: Negative for abdominal pain and vomiting  Genitourinary: Negative for dysuria and hematuria  Musculoskeletal: Negative for arthralgias and back pain  Skin: Negative for color change and rash  Neurological: Negative for seizures and syncope  All other systems reviewed and are negative  Objective:      /70 (BP Location: Left arm, Patient Position: Sitting, Cuff Size: Large)   Pulse 100   Resp 16   Ht 5' 4" (1 626 m)   Wt 79 4 kg (175 lb)   SpO2 99%   BMI 30 04 kg/m²     No results found for: PSA       Physical Exam  Vitals reviewed  Constitutional:       General: She is not in acute distress  Appearance: Normal appearance  She is not ill-appearing, toxic-appearing or diaphoretic  HENT:      Head: Normocephalic and atraumatic  Eyes:      Extraocular Movements: Extraocular movements intact  Pupils: Pupils are equal, round, and reactive to light     Pulmonary:      Effort: Pulmonary effort is normal  Abdominal:      General: Abdomen is flat  There is no distension  Palpations: Abdomen is soft  There is no mass  Tenderness: There is no abdominal tenderness  There is no right CVA tenderness, left CVA tenderness, guarding or rebound  Hernia: No hernia is present  Comments: Left PCN in place capped  The skin around tube site is mildly irritated but no signs of erythema calor or infection  Musculoskeletal:      Right lower leg: No edema  Left lower leg: No edema  Skin:     General: Skin is warm  Neurological:      General: No focal deficit present  Mental Status: She is alert and oriented to person, place, and time  Mental status is at baseline  Psychiatric:         Mood and Affect: Mood normal          Behavior: Behavior normal          Thought Content: Thought content normal            Orders  No orders of the defined types were placed in this encounter

## 2022-03-28 NOTE — PROGRESS NOTES
Assessment/Plan:    Uric acid nephrolithiasis  She had uric acid staghorn likely secondary to her diabetes which is now better controlled and also BMI  We discussed interventions including drinking lemon juice and taking potassium citrate pills in effort to prevent recurrence  I think it is unlikely she can clear her current stone burden with medical therapy so will still plan to perform 2nd PCNL to treat residual stone  Patient voiced understanding and agreement    Staghorn renal calculus  The patient had a very large left renal staghorn burden and we able to treat a large percentage of it but could not reach the lower pole stones with prior access  Plan to attempt a 2nd PCNL with access hopefully obtained through the lower pole to facilitate treatment of residual stone  She still has PCN in place which is capped and should help intervention Radiology perform nephrostogram to aid new access  Patient has already undergone surgery but risks of PCNL were briefly rediscussed including risk for bleeding, infection, residual stone, harm to the kidney requiring prolonged stent or PCN tube placement  Consent was NOT obtained today  Subjective:      Patient ID: Mary Marroquin is a 60 y o  female      HPI  Mary Marroquin is a 60 y o  female patient with a left staghorn uric acid calculus      The patient had Right-sided flank pain and a  E coli urine tract infection treated with Keflex   A CT non con was done showing LEFT staghorn occupying interpolar and lower poles with upper pole cystic lesion possibly representing dilated calyx   The radiology read was also slightly concerning for possible XGP kidney based on findings of stranding and perirenal lymphadenopathy      Patient seen by Urology in follow up and denied any pain on the left side   She denies any history of recurrent infections or pyelonephritis on the left side      She has a history of recurrent nephrolithiasis and has had multiple lithotripsies surgeries in the past all before 2016  Huseyin Fenton were apparently done at [facility] by Dr Arminda Smith I cannot see operative notes      Because of CT scan findings concerning for XGP kidney a repeat CT scan with contrast was obtained which was not as concerning in appearance  Elected for left PCNL  Left PCNU placed 3/1/22 with IR and PCNL performed 3/7/22, however could not access large lower pole stone burden  PCN was capped but kept in place to facilitate future PCNU placement  Patient had a longer hospital stay because of fever on postop day 1 with negative cultures but possible pneumonia so sent out on antibiotics  She now returns in follow-up  Overall doing well  Sometimes has drainage around PCN tube which has been capped  Denies fevers  Stone analysis showed 80% uric acid, 20% CaOx Monohydrate      Patient reports some difficulty voiding in the mornings  Giovana Dyers over the she feels like she is emptying better   PVR 17 cc      She has a history of hysterectomy 20 years ago for severe endometriosis associated with bladder involvement/ injury at the time that required urological repair      Patient's A1c was 13%, now down to 8%        Past Surgical History:   Procedure Laterality Date     SECTION      FL RETROGRADE PYELOGRAM  3/7/2022    HYSTERECTOMY      age 37 per MRS    IR NEPHROURETERAL ACCESS FOR UROLOGY PCNL  3/1/2022    OOPHORECTOMY Bilateral     age 37 per MRS, with hysterectomy    AR GARRETTUT REMV KID STONE,2+ CM Left 3/7/2022    Procedure: NEPHROLITHOTOMY  PERCUTANEOUS (PCNL);   Surgeon: Tessa Shone, MD;  [facility];  Service: Urology    TOTAL ABDOMINAL HYSTERECTOMY W/ BILATERAL SALPINGOOPHORECTOMY          Past Medical History:   Diagnosis Date    Abnormal LFTs (liver function tests)     last assessed 14    Anemia     last assessed 14    Anesthesia complication     Restrictive airway diesease- has asthma attack when extubated    Asthma  Diabetes mellitus ([facility] )     Diabetes mellitus due to underlying condition with hyperglycemia, without long-term current use of insulin ([place] [id number] ) 9/11/2020    Elevated blood pressure reading     last assessed 06/17/13    GERD (gastroesophageal reflux disease)     Hematuria     last assessed 09/11/14    Hypercalcemia     last assessed 09/11/14    Hyperlipidemia     Kidney stone     Leukocytosis     last assessed 09/11/124    Microalbuminuria     last assessed 09/11/14    Restrictive lung disease     Type 2 diabetes mellitus with hyperglycemia ([place] [address] ) 7/2/2013    Type 2 diabetes mellitus without complication, without long-term current use of insulin ([place] [address] ) 9/11/2020             Review of Systems   Constitutional: Negative for chills and fever  HENT: Negative for ear pain and sore throat  Eyes: Negative for pain and visual disturbance  Respiratory: Negative for cough and shortness of breath  Cardiovascular: Negative for chest pain and palpitations  Gastrointestinal: Negative for abdominal pain and vomiting  Genitourinary: Negative for dysuria and hematuria  Musculoskeletal: Negative for arthralgias and back pain  Skin: Negative for color change and rash  Neurological: Negative for seizures and syncope  All other systems reviewed and are negative  Objective:      /70 (BP Location: Left arm, Patient Position: Sitting, Cuff Size: Large)   Pulse 100   Resp 16   Ht 5' 4" (1 626 m)   Wt 79 4 kg (175 lb)   SpO2 99%   BMI 30 04 kg/m²     No results found for: PSA       Physical Exam  Vitals reviewed  Constitutional:       General: She is not in acute distress  Appearance: Normal appearance  She is not ill-appearing, toxic-appearing or diaphoretic  HENT:      Head: Normocephalic and atraumatic  Eyes:      Extraocular Movements: Extraocular movements intact  Pupils: Pupils are equal, round, and reactive to light     Pulmonary:      Effort: Pulmonary effort is normal  Abdominal:      General: Abdomen is flat  There is no distension  Palpations: Abdomen is soft  There is no mass  Tenderness: There is no abdominal tenderness  There is no right CVA tenderness, left CVA tenderness, guarding or rebound  Hernia: No hernia is present  Comments: Left PCN in place capped  The skin around tube site is mildly irritated but no signs of erythema calor or infection  Musculoskeletal:      Right lower leg: No edema  Left lower leg: No edema  Skin:     General: Skin is warm  Neurological:      General: No focal deficit present  Mental Status: She is alert and oriented to person, place, and time  Mental status is at baseline  Psychiatric:         Mood and Affect: Mood normal          Behavior: Behavior normal          Thought Content: Thought content normal            Orders  No orders of the defined types were placed in this encounter

## 2022-03-29 DIAGNOSIS — E11.65 UNCONTROLLED TYPE 2 DIABETES MELLITUS WITH HYPERGLYCEMIA (HCC): ICD-10-CM

## 2022-03-29 PROBLEM — N20.0 URIC ACID NEPHROLITHIASIS: Status: ACTIVE | Noted: 2022-03-29

## 2022-03-29 RX ORDER — DULAGLUTIDE 0.75 MG/.5ML
0.75 INJECTION, SOLUTION SUBCUTANEOUS
Qty: 2 ML | Refills: 0 | Status: SHIPPED | OUTPATIENT
Start: 2022-03-29 | End: 2022-04-25

## 2022-03-29 NOTE — ASSESSMENT & PLAN NOTE
The patient had a very large left renal staghorn burden and we able to treat a large percentage of it but could not reach the lower pole stones with prior access  Plan to attempt a 2nd PCNL with access hopefully obtained through the lower pole to facilitate treatment of residual stone  She still has PCN in place which is capped and should help intervention Radiology perform nephrostogram to aid new access  Patient has already undergone surgery but risks of PCNL were briefly rediscussed including risk for bleeding, infection, residual stone, harm to the kidney requiring prolonged stent or PCN tube placement  Consent was NOT obtained today

## 2022-03-29 NOTE — ASSESSMENT & PLAN NOTE
She had uric acid staghorn likely secondary to her diabetes which is now better controlled and also BMI  We discussed interventions including drinking lemon juice and taking potassium citrate pills in effort to prevent recurrence  I think it is unlikely she can clear her current stone burden with medical therapy so will still plan to perform 2nd PCNL to treat residual stone    Patient voiced understanding and agreement

## 2022-03-30 ENCOUNTER — TELEPHONE (OUTPATIENT)
Dept: UROLOGY | Facility: MEDICAL CENTER | Age: 57
End: 2022-03-30

## 2022-03-30 NOTE — TELEPHONE ENCOUNTER
Patient of Dr Alyssa Godfrey at [facility]    Patient called stating she has an appointment with IR for 04/01/22 at 10/45 am for nephrostomy check  She stated Dr Alyssa Godfrey checked it when she came for appointment on 03/28/22  She stated her family doctor told her that there a note stating if the appointment was not needed it could be cancelled  Please verify and let her know  If she should cancel it  For IR  Department  She also wants to speak to Surgical Scheduler

## 2022-03-31 NOTE — TELEPHONE ENCOUNTER
Spoke with patient, advised that IR appointment for tomorrow not needed  Advised patient, per Dr Reymundo Flores, that surgery scheduler is working on a date  Advised that  will arrange new nephrostomy placement based on surgical date  Patient still requesting call from  just for a better idea on a update

## 2022-04-01 NOTE — TELEPHONE ENCOUNTER
Spoke with patient and let her know Dr Greta Frost is scheduling in June, I did inform her I would be watching to see if any time was available before then   I will call her back on Monday

## 2022-04-04 NOTE — TELEPHONE ENCOUNTER
Spoke with Kwesi Morales and let her know there is currently no openings but I am going to continue to look and will let her know as soon as something becomes available

## 2022-04-08 ENCOUNTER — NURSE TRIAGE (OUTPATIENT)
Dept: OTHER | Facility: OTHER | Age: 57
End: 2022-04-08

## 2022-04-08 ENCOUNTER — TELEPHONE (OUTPATIENT)
Dept: UROLOGY | Facility: AMBULATORY SURGERY CENTER | Age: 57
End: 2022-04-08

## 2022-04-08 NOTE — TELEPHONE ENCOUNTER
I called spoke with the patient  She has mild discomfort around her PCN site associated with yellow slightly thick drainage and erythema of skin and pain  She also has frequency urgency and wonders if she could also have UTI  Denies kidney pain  I think it is prudent to put on a course of antibiotics    She is currently in [place] so I called in a prescription for Augmentin double-strength x7 days to the [facility] pharmacy of her choice in [address] located at [facility] Professor Spence [address] [place] [address]  [phone]

## 2022-04-08 NOTE — TELEPHONE ENCOUNTER
Reason for Disposition   Urinating more frequently than usual (i e , frequency)    Answer Assessment - Initial Assessment Questions  1  SYMPTOM: "What's the main symptom you're concerned about?" (e g , frequency, incontinence)      Frequency and burning with urination     2  ONSET: "When did the  symptoms  start?"      Last night     3  PAIN: "Is there any pain?" If Yes, ask: "How bad is it?" (Scale: 1-10; mild, moderate, severe)      7/10     4  CAUSE: "What do you think is causing the symptoms?"      Possible UTI     5  OTHER SYMPTOMS: "Do you have any other symptoms?" (e g , fever, flank pain, blood in urine, pain with urination)      Pain with urination, slight odor in urine  At nephrostomy tube site patient has some redness and small amount of puss (pea sized) in the area      Protocols used: URINARY SYMPTOMS-ADULT-[place]

## 2022-04-08 NOTE — TELEPHONE ENCOUNTER
Patient calling in stating she has some burning and frequency with urination  She is also having some redness and had a pea-sized amount of pus come from her nephrostomy tube site this morning  Patient is currently in [place] and will not be home until next week  She would like to know if Dr Rakesh Murcia could order her an antibiotic before she gets home  Please call patient back  Negative

## 2022-04-08 NOTE — TELEPHONE ENCOUNTER
Returned call to patient   Patient states drainage from around Nephrostomy tube site  yellowish in color drainage and painful, some redness reported  Nephrostomy tube is capped  No fevers reported  Frequency, urgency , yellow and cloudy with urination  Patient states she is concerned about going to an Urgent Care /ED because she is not sure that her insurance        Confirmed pharmacy as :     Antolin Sprague , [address]  [phone]

## 2022-04-08 NOTE — TELEPHONE ENCOUNTER
Regarding: possible uti-possible infection at incision site  ----- Message from [facility] sent at 4/8/2022  1:28 PM EDT -----  "I am showing signs of a UTI  I had a surgery on 3/7/2022    I am now experiencing redness and pus around the incision site "

## 2022-04-12 ENCOUNTER — PREP FOR PROCEDURE (OUTPATIENT)
Dept: UROLOGY | Facility: CLINIC | Age: 57
End: 2022-04-12

## 2022-04-12 DIAGNOSIS — N20.0 STAGHORN RENAL CALCULUS: Primary | ICD-10-CM

## 2022-04-12 NOTE — TELEPHONE ENCOUNTER
Duplicate encounter, added to ongoing message  Patient was very angry for message not being sent to [facility]

## 2022-04-12 NOTE — TELEPHONE ENCOUNTER
Christy Sandhoff routed conversation to  [facility] 6 minutes ago (12:19 PM)     Christy Sandhoff 7 minutes ago (12:19 PM)     MR       Patient is requesting a call back, 2nd request from [facility]  Documentation      Luis E Wong [phone]  Christy Sandhoff 8 minutes ago (12:17 PM)     I called patient to try and tell her it is being worked on but she went on to complain incessantly that she is not hearing back from [facility]  Patient states she doesn't want to hear back from anyone else but [facility] at this point  Will pass the message since patient did not want to listen to what I had to say

## 2022-04-12 NOTE — TELEPHONE ENCOUNTER
Spoke with patient and she is scheduled for 4/20 in the PM at [facility] with AS  She is aware the hospital will call the day prior with time of arrival, she will need a , nothing to eat or drink after midnight, and she will hold her blood thinning medications 7 days prior  She will go for repeat urine culture this weekend after she completes her current antibiotics

## 2022-04-13 DIAGNOSIS — N20.0 STAGHORN RENAL CALCULUS: Primary | ICD-10-CM

## 2022-04-13 NOTE — PROGRESS NOTES
Interventional Radiology Note    60year-old female with history of left staghorn calculi s/p left PCNU on 3/1/22 and PCNL on 3/7/22 which was incompletely treated  Urology now requesting left lower pole access prior to completion PCNL on 4/20  We will try to accommodate the patient prior to planned PCNL pending scheduling needs      Estevan Garcia MD  Interventional Radiology

## 2022-04-13 NOTE — TELEPHONE ENCOUNTER
LM for IR letting them know that patient is scheduled for PCNL and needs to be scheduled for IR, asked they call patient directly to schedule

## 2022-04-16 ENCOUNTER — APPOINTMENT (OUTPATIENT)
Dept: LAB | Facility: CLINIC | Age: 57
End: 2022-04-16
Payer: MEDICARE

## 2022-04-16 DIAGNOSIS — N20.0 STAGHORN RENAL CALCULUS: ICD-10-CM

## 2022-04-16 PROCEDURE — 87086 URINE CULTURE/COLONY COUNT: CPT

## 2022-04-16 PROCEDURE — 87106 FUNGI IDENTIFICATION YEAST: CPT

## 2022-04-19 ENCOUNTER — HOSPITAL ENCOUNTER (OUTPATIENT)
Dept: RADIOLOGY | Facility: HOSPITAL | Age: 57
Discharge: HOME | End: 2022-04-19
Attending: INTERNAL MEDICINE | Admitting: RADIOLOGY
Payer: MEDICARE

## 2022-04-19 ENCOUNTER — ANESTHESIA EVENT (OUTPATIENT)
Dept: PERIOP | Facility: HOSPITAL | Age: 57
DRG: 443 | End: 2022-04-19
Payer: MEDICARE

## 2022-04-19 ENCOUNTER — ANESTHESIA EVENT (OUTPATIENT)
Dept: RADIOLOGY | Facility: HOSPITAL | Age: 57
End: 2022-04-19

## 2022-04-19 ENCOUNTER — ANESTHESIA (OUTPATIENT)
Dept: RADIOLOGY | Facility: HOSPITAL | Age: 57
End: 2022-04-19

## 2022-04-19 ENCOUNTER — TELEPHONE (OUTPATIENT)
Dept: UROLOGY | Facility: CLINIC | Age: 57
End: 2022-04-19

## 2022-04-19 VITALS
HEIGHT: 64 IN | DIASTOLIC BLOOD PRESSURE: 73 MMHG | RESPIRATION RATE: 18 BRPM | HEART RATE: 85 BPM | TEMPERATURE: 96.7 F | WEIGHT: 173 LBS | BODY MASS INDEX: 29.53 KG/M2 | OXYGEN SATURATION: 98 % | SYSTOLIC BLOOD PRESSURE: 134 MMHG

## 2022-04-19 DIAGNOSIS — N39.0 RECURRENT UTI: Primary | ICD-10-CM

## 2022-04-19 DIAGNOSIS — N20.0 STAGHORN RENAL CALCULUS: ICD-10-CM

## 2022-04-19 PROBLEM — R21 SKIN RASH: Status: ACTIVE | Noted: 2017-09-21

## 2022-04-19 PROBLEM — R20.2 PARESTHESIA: Status: ACTIVE | Noted: 2017-09-21

## 2022-04-19 LAB — GLUCOSE SERPL-MCNC: 98 MG/DL (ref 65–140)

## 2022-04-19 PROCEDURE — 76937 US GUIDE VASCULAR ACCESS: CPT

## 2022-04-19 PROCEDURE — 50435 EXCHANGE NEPHROSTOMY CATH: CPT

## 2022-04-19 PROCEDURE — 50433 PLMT NEPHROURETERAL CATHETER: CPT | Performed by: RADIOLOGY

## 2022-04-19 PROCEDURE — 82948 REAGENT STRIP/BLOOD GLUCOSE: CPT

## 2022-04-19 PROCEDURE — 50433 PLMT NEPHROURETERAL CATHETER: CPT

## 2022-04-19 PROCEDURE — 50435 EXCHANGE NEPHROSTOMY CATH: CPT | Performed by: RADIOLOGY

## 2022-04-19 PROCEDURE — C1769 GUIDE WIRE: HCPCS

## 2022-04-19 PROCEDURE — C1729 CATH, DRAINAGE: HCPCS

## 2022-04-19 PROCEDURE — C1894 INTRO/SHEATH, NON-LASER: HCPCS

## 2022-04-19 PROCEDURE — C1887 CATHETER, GUIDING: HCPCS

## 2022-04-19 PROCEDURE — C2617 STENT, NON-COR, TEM W/O DEL: HCPCS

## 2022-04-19 RX ORDER — SODIUM CHLORIDE 9 MG/ML
INJECTION, SOLUTION INTRAVENOUS CONTINUOUS PRN
Status: DISCONTINUED | OUTPATIENT
Start: 2022-04-19 | End: 2022-04-19

## 2022-04-19 RX ORDER — SUCCINYLCHOLINE/SOD CL,ISO/PF 100 MG/5ML
SYRINGE (ML) INTRAVENOUS AS NEEDED
Status: DISCONTINUED | OUTPATIENT
Start: 2022-04-19 | End: 2022-04-19

## 2022-04-19 RX ORDER — FAMOTIDINE 10 MG
10 TABLET ORAL AS NEEDED
COMMUNITY
End: 2022-06-09

## 2022-04-19 RX ORDER — ONDANSETRON 2 MG/ML
4 INJECTION INTRAMUSCULAR; INTRAVENOUS ONCE AS NEEDED
Status: DISCONTINUED | OUTPATIENT
Start: 2022-04-19 | End: 2022-04-20 | Stop reason: HOSPADM

## 2022-04-19 RX ORDER — SODIUM CHLORIDE 9 MG/ML
100 INJECTION, SOLUTION INTRAVENOUS CONTINUOUS
Status: DISCONTINUED | OUTPATIENT
Start: 2022-04-19 | End: 2022-04-20 | Stop reason: HOSPADM

## 2022-04-19 RX ORDER — PROPOFOL 10 MG/ML
INJECTION, EMULSION INTRAVENOUS AS NEEDED
Status: DISCONTINUED | OUTPATIENT
Start: 2022-04-19 | End: 2022-04-19

## 2022-04-19 RX ORDER — ALBUTEROL SULFATE 90 UG/1
AEROSOL, METERED RESPIRATORY (INHALATION) AS NEEDED
Status: DISCONTINUED | OUTPATIENT
Start: 2022-04-19 | End: 2022-04-19

## 2022-04-19 RX ORDER — DEXAMETHASONE SODIUM PHOSPHATE 10 MG/ML
INJECTION, SOLUTION INTRAMUSCULAR; INTRAVENOUS AS NEEDED
Status: DISCONTINUED | OUTPATIENT
Start: 2022-04-19 | End: 2022-04-19

## 2022-04-19 RX ORDER — FLUCONAZOLE 200 MG/1
200 TABLET ORAL DAILY
Qty: 14 TABLET | Refills: 0 | Status: SHIPPED | OUTPATIENT
Start: 2022-04-19 | End: 2022-05-03

## 2022-04-19 RX ORDER — MIDAZOLAM HYDROCHLORIDE 2 MG/2ML
INJECTION, SOLUTION INTRAMUSCULAR; INTRAVENOUS AS NEEDED
Status: DISCONTINUED | OUTPATIENT
Start: 2022-04-19 | End: 2022-04-19

## 2022-04-19 RX ORDER — FENTANYL CITRATE 50 UG/ML
INJECTION, SOLUTION INTRAMUSCULAR; INTRAVENOUS AS NEEDED
Status: DISCONTINUED | OUTPATIENT
Start: 2022-04-19 | End: 2022-04-19

## 2022-04-19 RX ORDER — LIDOCAINE HYDROCHLORIDE 10 MG/ML
INJECTION, SOLUTION EPIDURAL; INFILTRATION; INTRACAUDAL; PERINEURAL AS NEEDED
Status: DISCONTINUED | OUTPATIENT
Start: 2022-04-19 | End: 2022-04-19

## 2022-04-19 RX ORDER — ONDANSETRON 2 MG/ML
INJECTION INTRAMUSCULAR; INTRAVENOUS AS NEEDED
Status: DISCONTINUED | OUTPATIENT
Start: 2022-04-19 | End: 2022-04-19

## 2022-04-19 RX ORDER — KETAMINE HYDROCHLORIDE 50 MG/ML
INJECTION, SOLUTION, CONCENTRATE INTRAMUSCULAR; INTRAVENOUS AS NEEDED
Status: DISCONTINUED | OUTPATIENT
Start: 2022-04-19 | End: 2022-04-19

## 2022-04-19 RX ORDER — CEFAZOLIN SODIUM 1 G/50ML
SOLUTION INTRAVENOUS AS NEEDED
Status: DISCONTINUED | OUTPATIENT
Start: 2022-04-19 | End: 2022-04-19

## 2022-04-19 RX ORDER — ACETAMINOPHEN 325 MG/1
650 TABLET ORAL EVERY 4 HOURS PRN
Status: DISCONTINUED | OUTPATIENT
Start: 2022-04-19 | End: 2022-04-20 | Stop reason: HOSPADM

## 2022-04-19 RX ORDER — FENTANYL CITRATE/PF 50 MCG/ML
50 SYRINGE (ML) INJECTION
Status: DISCONTINUED | OUTPATIENT
Start: 2022-04-19 | End: 2022-04-20 | Stop reason: HOSPADM

## 2022-04-19 RX ADMIN — ACETAMINOPHEN 650 MG: 325 TABLET, FILM COATED ORAL at 15:45

## 2022-04-19 RX ADMIN — FENTANYL CITRATE 50 MCG: 50 INJECTION, SOLUTION INTRAMUSCULAR; INTRAVENOUS at 14:03

## 2022-04-19 RX ADMIN — PROPOFOL 150 MG: 10 INJECTION, EMULSION INTRAVENOUS at 13:22

## 2022-04-19 RX ADMIN — ALBUTEROL SULFATE 2 PUFF: 90 AEROSOL, METERED RESPIRATORY (INHALATION) at 13:19

## 2022-04-19 RX ADMIN — ALBUTEROL SULFATE 2 PUFF: 90 AEROSOL, METERED RESPIRATORY (INHALATION) at 14:19

## 2022-04-19 RX ADMIN — KETAMINE HYDROCHLORIDE 30 MG: 50 INJECTION INTRAMUSCULAR; INTRAVENOUS at 13:22

## 2022-04-19 RX ADMIN — ONDANSETRON 4 MG: 2 INJECTION INTRAMUSCULAR; INTRAVENOUS at 13:22

## 2022-04-19 RX ADMIN — IOHEXOL 45 ML: 350 INJECTION, SOLUTION INTRAVENOUS at 14:11

## 2022-04-19 RX ADMIN — LIDOCAINE HYDROCHLORIDE 100 MG: 10 INJECTION, SOLUTION EPIDURAL; INFILTRATION; INTRACAUDAL at 13:22

## 2022-04-19 RX ADMIN — SODIUM CHLORIDE: 0.9 INJECTION, SOLUTION INTRAVENOUS at 13:20

## 2022-04-19 RX ADMIN — KETAMINE HYDROCHLORIDE 10 MG: 50 INJECTION INTRAMUSCULAR; INTRAVENOUS at 13:49

## 2022-04-19 RX ADMIN — FENTANYL CITRATE 50 MCG: 50 INJECTION, SOLUTION INTRAMUSCULAR; INTRAVENOUS at 13:22

## 2022-04-19 RX ADMIN — Medication 100 MG: at 13:22

## 2022-04-19 RX ADMIN — CEFAZOLIN SODIUM 1000 MG: 1 SOLUTION INTRAVENOUS at 13:39

## 2022-04-19 RX ADMIN — MIDAZOLAM HYDROCHLORIDE 2 MG: 1 INJECTION, SOLUTION INTRAMUSCULAR; INTRAVENOUS at 13:19

## 2022-04-19 RX ADMIN — DEXAMETHASONE SODIUM PHOSPHATE 10 MG: 10 INJECTION, SOLUTION INTRAMUSCULAR; INTRAVENOUS at 13:22

## 2022-04-19 NOTE — H&P
Interventional Radiology  History and Physical 2022     Baron Chambers   1965   [id number]    Assessment/Plan:  49-year-old female with left staghorn renal calculus presents for PCNU placement through the lower pole of left kidney for PCNL access  Patient previously underwent upper pole left kidney PCNU placement  Problem List Items Addressed This Visit        Genitourinary    Staghorn renal calculus    Relevant Orders    IR nephrostomy tube placement             Subjective:     Patient ID: Baron Chambers is a 60 y o  female  History of Present Illness  Patient with left staghorn renal calculus presents for PCNU placement through the lower pole of left kidney for PCNL access  Patient previously underwent upper pole left kidney PCNU placement      Review of Systems      Past Medical History:   Diagnosis Date    Abnormal LFTs (liver function tests)     last assessed 14    Anemia     last assessed 14    Anesthesia complication     Restrictive airway diesease- has asthma attack when extubated    Asthma     Colon polyp     Diabetes mellitus ([address] )     Diabetes mellitus due to underlying condition with hyperglycemia, without long-term current use of insulin ([facility] ) 2020    Elevated blood pressure reading     last assessed 13    GERD (gastroesophageal reflux disease)     Hematuria     last assessed 14    Hypercalcemia     last assessed 14    Hyperlipidemia     Kidney stone     Leukocytosis     last assessed     Microalbuminuria     last assessed 14    Restrictive lung disease     Type 2 diabetes mellitus with hyperglycemia ([address] ) 2013    Type 2 diabetes mellitus without complication, without long-term current use of insulin ([facility] [address] ) 2020        Past Surgical History:   Procedure Laterality Date     SECTION      FL RETROGRADE PYELOGRAM  3/7/2022    HYSTERECTOMY      age 37 per MRS    IR NEPHROURETERAL ACCESS FOR UROLOGY PCNL  3/1/2022    OOPHORECTOMY Bilateral     age 37 per MRS, with hysterectomy    AK MARIO REMV KID STONE,2+ CM Left 3/7/2022    Procedure: NEPHROLITHOTOMY  PERCUTANEOUS (PCNL);   Surgeon: Quintin Moe MD;  Location: [facility];  Service: Urology    TOTAL ABDOMINAL HYSTERECTOMY W/ BILATERAL SALPINGOOPHORECTOMY          Social History     Tobacco Use   Smoking Status Never Smoker   Smokeless Tobacco Never Used        Social History     Substance and Sexual Activity   Alcohol Use Yes    Comment: social        Social History     Substance and Sexual Activity   Drug Use Never        Allergies   Allergen Reactions    Seasonal Ic  [Cholestatin] Allergic Rhinitis       Current Outpatient Medications   Medication Sig Dispense Refill    albuterol (ProAir HFA) 90 mcg/act inhaler Inhale 1-2 puffs every 4 (four) hours as needed for wheezing 18 g 3    aspirin 81 MG tablet Take 1 tablet by mouth daily      atorvastatin (LIPITOR) 40 mg tablet Take 0 5 tablets (20 mg total) by mouth daily at bedtime 90 tablet 1    cholecalciferol (VITAMIN D3) 1,000 units tablet Take 1,000 Units by mouth daily        cyanocobalamin (VITAMIN B-12) 500 MCG tablet Take 500 mcg by mouth daily      docusate sodium (COLACE) 100 mg capsule Take 1 capsule (100 mg total) by mouth 2 (two) times a day 30 capsule 1    Empagliflozin 25 MG TABS Take 1 tablet (25 mg total) by mouth every morning For diabetes 90 tablet 1    famotidine (PEPCID) 10 mg tablet Take 10 mg by mouth as needed for heartburn      insulin glargine (Lantus SoloStar) 100 units/mL injection pen Inject 20 Units under the skin daily at bedtime Inject 16 units daily (Patient taking differently: Inject 20 Units under the skin daily at bedtime  ) 30 mL 1    lisinopril (ZESTRIL) 5 mg tablet Take 1 tablet (5 mg total) by mouth daily 90 tablet 3    metFORMIN (GLUCOPHAGE-XR) 500 mg 24 hr tablet Take 2 tablets (1,000 mg total) by mouth 2 (two) times a day with meals 360 tablet 3    montelukast (SINGULAIR) 10 mg tablet Take 1 tablet (10 mg total) by mouth every evening 90 tablet 3    oxybutynin (DITROPAN) 5 mg tablet Take 1 tablet (5 mg total) by mouth 3 (three) times a day as needed (stent pain) 30 tablet 1    potassium citrate (UROCIT-K) 10 mEq Take 1 tablet (10 mEq total) by mouth 2 (two) times a day 120 tablet 6    ferrous sulfate 324 (65 Fe) mg Take 1 tablet (324 mg total) by mouth every other day (Patient not taking: Reported on 3/22/2022 )      fluconazole (DIFLUCAN) 200 mg tablet Take 1 tablet (200 mg total) by mouth daily for 14 days 14 tablet 0    glucose blood (OneTouch Ultra) test strip Use 1 each 3 (three) times a day 300 each 3    Insulin Pen Needle (Pen Needles) 32G X 4 MM MISC Use daily at bedtime 100 each 3    Lancets (OneTouch Delica Plus [id number]) MISC Test  each 3    sodium chloride, PF, 0 9 % 10 mL by Intracatheter route daily Intracatheter flushing daily 391 mL 2    Trulicity 5 95 QP/5 5IY SOPN INJECT 0 5 ML (0 75 MG TOTAL) UNDER THE SKIN EVERY 7 DAYS 2 mL 0     No current facility-administered medications for this encounter  Objective:    Vitals:    04/19/22 1222   BP: 120/69   Pulse: 83   Resp: 18   Temp: (!) 97 1 °F (36 2 °C)   TempSrc: Temporal   SpO2: 100%   Weight: 78 5 kg (173 lb)   Height: 5' 4" (1 626 m)        Physical Exam  Constitutional:       Appearance: Normal appearance  Cardiovascular:      Rate and Rhythm: Normal rate  Pulmonary:      Effort: Pulmonary effort is normal    Genitourinary:     Comments: Left PCNU in place          No results found for: BNP   Lab Results   Component Value Date    WBC 13 46 (H) 03/10/2022    HGB 8 9 (L) 03/10/2022    HCT 29 1 (L) 03/10/2022    MCV 84 03/10/2022     (H) 03/10/2022     Lab Results   Component Value Date    INR 0 81 (L) 03/01/2022    PROTIME 11 3 (L) 03/01/2022     No results found for: PTT      I have personally reviewed pertinent imaging and laboratory results       Code Status: No Order  Advance Directive and Living Will:      Power of :    POLST:      This text is generated with voice recognition software  There may be translation, syntax,  or grammatical errors  If you have any questions, please contact the dictating provider

## 2022-04-19 NOTE — ANESTHESIA PREPROCEDURE EVALUATION
Procedure:  IR NEPHROSTOMY TUBE PLACEMENT    Relevant Problems   CARDIO   (+) Benign essential hypertension   (+) Hyperlipidemia      /RENAL   (+) Kidney stone   (+) Staghorn renal calculus   (+) Uric acid nephrolithiasis      HEMATOLOGY   (+) Anemia      PULMONARY   (+) Mild persistent asthma             Anesthesia Plan  ASA Score- 2     Anesthesia Type- general with ASA Monitors  Additional Monitors:   Airway Plan: ETT  Plan Factors-Exercise tolerance (METS): >4 METS  Chart reviewed  Existing labs reviewed  Patient summary reviewed  Patient is not a current smoker  Obstructive sleep apnea risk education given perioperatively  Induction- intravenous  Postoperative Plan- Plan for postoperative opioid use  Planned trial extubation    Informed Consent- Anesthetic plan and risks discussed with patient  I personally reviewed this patient with the CRNA  Discussed and agreed on the Anesthesia Plan with the CRNA  Joey Ayala

## 2022-04-19 NOTE — DISCHARGE INSTRUCTIONS
Nephrostomy Tube Care     WHAT YOU NEED TO KNOW:   A nephrostomy tube is a catheter (thin plastic tube) that is inserted through your skin and into your kidney  The nephrostomy tube drains urine from your kidney into a collecting bag outside your body  You may need a nephrostomy tube when something is blocking the normal flow of urine  A nephrostomy tube may be used for a short or a long period of time  The nephrostomy tube comes out of your back, so you will need someone to help care for your nephrostomy tube  DISCHARGE INSTRUCTIONS:      How to clean the skin around the nephrostomy tube and change the bandage:  Since the nephrostomy tube comes out of your back, you will not be able to care for it by yourself  Ask someone to follow the general directions below to check and care for your nephrostomy tube  Gather the items you will need  Disposable (single use) under-pad, and a clean washcloth  ¨ Plain soap, warm water, and new medical gloves  ¨ Sterile gauze bandages  ¨ Clear adhesive dressing or medical tape  ¨ Skin barrier  ¨ Protective skin film  ¨ Trash bag  · Remove the old bandage, and check the tube entry site  ¨ Have the patient lie on his side with the nephrostomy tube entry site facing up  Place the under-pad where it will catch drainage as you are working with the nephrostomy tube  ¨ Wash your hands with soap and water  Put on new medical gloves  ¨ Gently remove the old bandage, without pulling on the tube  Do this by holding the skin beside the tube with one hand  With the other hand, gently remove sticky tape and the skin barrier by pulling in the same direction as hair growth  Do not touch the side of the bandage that is placed over or around the tube  Throw the bandage and skin barrier away in a trash bag  ¨ Look for signs of infection, such as skin redness and swelling  Report any skin changes to healthcare providers  ¨ Clean the tube entry site      ¨ Hold the tube in place to keep it from being pulled out while you are cleaning around it  ¨ You will need to clean the area twice  For the first cleaning, wet a new gauze bandage with soap and water  Begin at the entry site of the tube  Wipe the skin in circles, moving away from the entry site  Remove blood and any other material with the gauze  Do this as often as needed  Use a new gauze bandage each time you clean the area, moving away from the entry site  ¨ For the second cleaning, wet a new gauze bandage with water  Begin at the entry site of the tube  Wipe the skin in circles, moving away from the entry site  Use a new gauze bandage each time you clean the area, moving away from the entry site  ¨ Gently pat the skin with a clean washcloth to dry it  · Apply the skin barrier and bandages  ¨ Roll up a bandage to make it thick, and place it under  the place where the tube enters the skin  Place it to support the tube, and stop it from kinking or bending  Tape the bandage in place, and apply more bandages if directed by a healthcare provider  ¨ Bring the tubing forward to the front and tape it to the skin  Do not stretch the tube tight, because this may pull the nephrostomy tube out  How often to change the bandage  Change the bandage around the tube, every other day  If your bandages  get dirty or wet, change them right away, and as often as needed  If your nephrostomy tube is to be used for a long period of time, the tube needs to be changed every 2 to 3 months  Healthcare providers will tell you when you need to make an appointment to have your tube changed  How to care for the urine drainage bag:   · Ask if you need to measure and write down how much urine is in the bag before you empty it  Drain urine out of the drainage bag when it is ½ to ? full  Open the spout at the bottom of the bag to empty the urine into the toilet  · You may need to detach the drainage bag from the nephrostomy tube to change it    If so, attach a new drainage bag tightly to the nephrostomy tube  ·   How to prevent problems with your nephrostomy tube:   · Change bandages, directed  This helps to prevent infection  Throw away or clean your drainage bag as directed by your healthcare provider  · Wipe the connecting ends of the drainage bag with alcohol before you reconnect the bag to the tube  This helps prevent infection  Keep the tube taped to your skin and connected to a drainage bag placed below the level of your kidneys  This helps prevent urine from backing up into your kidneys  You may wear a small drainage bag strapped to your leg to let you move around more easily  · Check the catheter to be sure it is in place after you change your clothes or do other activities  Do not wear tight clothing over the tube  Place the tubing over your thigh rather than under it when you are sitting down  Be sure that nothing is pulling on the nephrostomy tube when you move around  · Change positions if you see little or no urine in your drainage bag  Check to see if the urine tube is twisted or bent  Be sure that you are not sitting or lying on the tube  If there are no kinks and there is little or no urine in the drainage bag, tell your healthcare provider  · Flush out the tube as directed  Some tubes get flushed one time a day with 10 mls of NSS You will be given a prescription for the flushes  To flush the nephrostomy tube, clean both connections with alcohol swap  Twist off the drainage bag tube and twist the saline syringe into the nephrostomy tube and flush briskly  Remove the syringe and twist the drainage bag tube back into the nephrostomy tube  · Keep the site covered while you shower  Tape a piece of clear adhesive plastic over the dressing to keep it dry while you shower  Do not take tub baths      Contact Interventional Radiology at [phone] [facility] PATIENTS: Contact Interventional Radiology at [phone]) Mariaelena Richardson PATIENTS: Contact Interventional Radiology at [phone]) if:  · The skin around the nephrostomy tube is red, swollen, itches, or has a rash  · You have a fever greater than 101 or chills  · You have lower back or hip pain  · There are changes in how your urine looks or smells  · You have little or no urine draining from the nephrostomy tube  · You have nausea and are vomiting  · The black otto on your tube has moved, or the tube is longer than when it was put in    · You have questions or concerns about your condition or care  · The nephrostomy tube comes out completely  · There is blood, pus, or a bad smell coming from the place where the tube enters your skin  · Urine is leaking around the tube  The following pharmacies carry the flush syringes  [facility]                     [facility]       [address] [facility]                    [id number] [place] [address]  Phone [phone]            Phone [phone]  [address] [place] [address] & [facility]                      [address]                                 [phone]  Phone [phone]            Phone [phone]    [facility]                                                                         [facility] [phone]  [address]   Phone [phone]

## 2022-04-19 NOTE — ANESTHESIA POSTPROCEDURE EVALUATION
Post-Op Assessment Note    CV Status:  Stable    Pain management: adequate     Mental Status:  Alert and awake   Hydration Status:  Euvolemic   PONV Controlled:  Controlled   Airway Patency:  Patent      Post Op Vitals Reviewed: Yes      Staff: Anesthesiologist, CRNA         No complications documented      BP   152/81   Temp     Pulse  97   Resp   18   SpO2   100

## 2022-04-19 NOTE — TELEPHONE ENCOUNTER
Patient did not  the phone because she has with Interventional Radiology so I called her  in asked him to have her  an antifungal on the way home to start now given the recent finding of lusitaniae yeast in her urine (which based on my review of literature) should be sensitive to fluconazole      He voiced agreement and understanding

## 2022-04-19 NOTE — BRIEF OP NOTE (RAD/CATH)
INTERVENTIONAL RADIOLOGY PROCEDURE NOTE    Date: 4/19/2022    Procedure:   1  Left lower pole nephroureteral stent placement  2  Left upper pole PCN replacement    Preoperative diagnosis:   1  Staghorn renal calculus         Postoperative diagnosis: Same  Surgeon: Barbi Cote MD     Assistant: None  No qualified resident was available  Blood loss: 2 mL    Specimens: None     Findings:   1  Successful placement of left nephroureteral stent using a 4 [place] glide catheter for PCNL access through lower pole calyx  An 8 [employer] [place] PCNU catheter was unable to be advanced due to existing left ureteral stent and risk for dislodging the existing ureteral stent  2  The 8 [place] PCN catheter in upper pole calyx of left kidney was initially removed, however, replaced using 8 [place] catheter due to need for additional access if needed  Complications: None immediate      Anesthesia: local and general

## 2022-04-20 ENCOUNTER — HOSPITAL ENCOUNTER (INPATIENT)
Facility: HOSPITAL | Age: 57
LOS: 1 days | Discharge: HOME | DRG: 443 | End: 2022-04-23
Attending: UROLOGY | Admitting: UROLOGY
Payer: MEDICARE

## 2022-04-20 ENCOUNTER — APPOINTMENT (OUTPATIENT)
Dept: RADIOLOGY | Facility: HOSPITAL | Age: 57
DRG: 443 | End: 2022-04-20
Payer: MEDICARE

## 2022-04-20 ENCOUNTER — ANESTHESIA (OUTPATIENT)
Dept: PERIOP | Facility: HOSPITAL | Age: 57
DRG: 443 | End: 2022-04-20
Payer: MEDICARE

## 2022-04-20 DIAGNOSIS — Z01.818 PRE-OP TESTING: Primary | ICD-10-CM

## 2022-04-20 DIAGNOSIS — N20.0 STAGHORN RENAL CALCULUS: ICD-10-CM

## 2022-04-20 LAB
ABO GROUP BLD: NORMAL
ABO GROUP BLD: NORMAL
ANION GAP SERPL CALCULATED.3IONS-SCNC: 10 MMOL/L (ref 4–13)
BASE EXCESS BLDA CALC-SCNC: -1 MMOL/L (ref -2–3)
BLD GP AB SCN SERPL QL: NEGATIVE
BUN SERPL-MCNC: 20 MG/DL (ref 5–25)
CA-I BLD-SCNC: 1.15 MMOL/L (ref 1.12–1.32)
CALCIUM SERPL-MCNC: 7.9 MG/DL (ref 8.3–10.1)
CHLORIDE SERPL-SCNC: 107 MMOL/L (ref 100–108)
CO2 SERPL-SCNC: 23 MMOL/L (ref 21–32)
CREAT SERPL-MCNC: 1.04 MG/DL (ref 0.6–1.3)
ERYTHROCYTE [DISTWIDTH] IN BLOOD BY AUTOMATED COUNT: 15 % (ref 11.6–15.1)
GFR SERPL CREATININE-BSD FRML MDRD: 59 ML/MIN/1.73SQ M
GLUCOSE P FAST SERPL-MCNC: 169 MG/DL (ref 65–99)
GLUCOSE SERPL-MCNC: 145 MG/DL (ref 65–140)
GLUCOSE SERPL-MCNC: 169 MG/DL (ref 65–140)
GLUCOSE SERPL-MCNC: 170 MG/DL (ref 65–140)
HCO3 BLDA-SCNC: 24.1 MMOL/L (ref 24–30)
HCT VFR BLD AUTO: 21.9 % (ref 34.8–46.1)
HCT VFR BLD CALC: 22 % (ref 34.8–46.1)
HGB BLD-MCNC: 6.7 G/DL (ref 11.5–15.4)
HGB BLDA-MCNC: 7.5 G/DL (ref 11.5–15.4)
MCH RBC QN AUTO: 24.4 PG (ref 26.8–34.3)
MCHC RBC AUTO-ENTMCNC: 29.7 G/DL (ref 31.4–37.4)
MCV RBC AUTO: 82 FL (ref 82–98)
PCO2 BLD: 25 MMOL/L (ref 21–32)
PCO2 BLD: 40.3 MM HG (ref 42–50)
PH BLD: 7.38 [PH] (ref 7.3–7.4)
PLATELET # BLD AUTO: 331 THOUSANDS/UL (ref 149–390)
PMV BLD AUTO: 8.9 FL (ref 8.9–12.7)
PO2 BLD: 122 MM HG (ref 35–45)
POTASSIUM BLD-SCNC: 4.5 MMOL/L (ref 3.5–5.3)
POTASSIUM SERPL-SCNC: 4.6 MMOL/L (ref 3.5–5.3)
RBC # BLD AUTO: 2.66 MILLION/UL (ref 3.81–5.12)
RH BLD: POSITIVE
RH BLD: POSITIVE
SAO2 % BLD FROM PO2: 99 % (ref 60–85)
SODIUM BLD-SCNC: 138 MMOL/L (ref 136–145)
SODIUM SERPL-SCNC: 140 MMOL/L (ref 136–145)
SPECIMEN EXPIRATION DATE: NORMAL
SPECIMEN SOURCE: ABNORMAL
WBC # BLD AUTO: 11.48 THOUSAND/UL (ref 4.31–10.16)

## 2022-04-20 PROCEDURE — 84295 ASSAY OF SERUM SODIUM: CPT

## 2022-04-20 PROCEDURE — 84132 ASSAY OF SERUM POTASSIUM: CPT

## 2022-04-20 PROCEDURE — 50080 PERQ NL/PL LITHOTRP SMPL<2CM: CPT | Performed by: UROLOGY

## 2022-04-20 PROCEDURE — 86923 COMPATIBILITY TEST ELECTRIC: CPT

## 2022-04-20 PROCEDURE — BT1F1ZZ FLUOROSCOPY OF LEFT KIDNEY, URETER AND BLADDER USING LOW OSMOLAR CONTRAST: ICD-10-PCS | Performed by: UROLOGY

## 2022-04-20 PROCEDURE — 85027 COMPLETE CBC AUTOMATED: CPT | Performed by: UROLOGY

## 2022-04-20 PROCEDURE — C1726 CATH, BAL DIL, NON-VASCULAR: HCPCS | Performed by: UROLOGY

## 2022-04-20 PROCEDURE — 86850 RBC ANTIBODY SCREEN: CPT | Performed by: UROLOGY

## 2022-04-20 PROCEDURE — 86900 BLOOD TYPING SEROLOGIC ABO: CPT | Performed by: UROLOGY

## 2022-04-20 PROCEDURE — 82330 ASSAY OF CALCIUM: CPT

## 2022-04-20 PROCEDURE — C2617 STENT, NON-COR, TEM W/O DEL: HCPCS | Performed by: UROLOGY

## 2022-04-20 PROCEDURE — C1769 GUIDE WIRE: HCPCS | Performed by: UROLOGY

## 2022-04-20 PROCEDURE — 0TC43ZZ EXTIRPATION OF MATTER FROM LEFT KIDNEY PELVIS, PERCUTANEOUS APPROACH: ICD-10-PCS | Performed by: UROLOGY

## 2022-04-20 PROCEDURE — 0TC13ZZ EXTIRPATION OF MATTER FROM LEFT KIDNEY, PERCUTANEOUS APPROACH: ICD-10-PCS | Performed by: UROLOGY

## 2022-04-20 PROCEDURE — 82803 BLOOD GASES ANY COMBINATION: CPT

## 2022-04-20 PROCEDURE — 86901 BLOOD TYPING SEROLOGIC RH(D): CPT | Performed by: UROLOGY

## 2022-04-20 PROCEDURE — 82947 ASSAY GLUCOSE BLOOD QUANT: CPT

## 2022-04-20 PROCEDURE — 82948 REAGENT STRIP/BLOOD GLUCOSE: CPT

## 2022-04-20 PROCEDURE — 85014 HEMATOCRIT: CPT

## 2022-04-20 PROCEDURE — 82360 CALCULUS ASSAY QUANT: CPT | Performed by: UROLOGY

## 2022-04-20 PROCEDURE — P9016 RBC LEUKOCYTES REDUCED: HCPCS

## 2022-04-20 PROCEDURE — 30233N1 TRANSFUSION OF NONAUTOLOGOUS RED BLOOD CELLS INTO PERIPHERAL VEIN, PERCUTANEOUS APPROACH: ICD-10-PCS | Performed by: UROLOGY

## 2022-04-20 PROCEDURE — 80048 BASIC METABOLIC PNL TOTAL CA: CPT | Performed by: UROLOGY

## 2022-04-20 PROCEDURE — 74420 UROGRAPHY RTRGR +-KUB: CPT

## 2022-04-20 PROCEDURE — 0T25X0Z CHANGE DRAINAGE DEVICE IN KIDNEY, EXTERNAL APPROACH: ICD-10-PCS | Performed by: UROLOGY

## 2022-04-20 DEVICE — IMPLANTABLE DEVICE: Type: IMPLANTABLE DEVICE | Site: URETER | Status: FUNCTIONAL

## 2022-04-20 RX ORDER — HYDROMORPHONE HCL/PF 1 MG/ML
0.5 SYRINGE (ML) INJECTION EVERY 2 HOUR PRN
Status: ACTIVE | OUTPATIENT
Start: 2022-04-20 | End: 2022-04-22

## 2022-04-20 RX ORDER — MAGNESIUM HYDROXIDE 1200 MG/15ML
LIQUID ORAL AS NEEDED
Status: DISCONTINUED | OUTPATIENT
Start: 2022-04-20 | End: 2022-04-20 | Stop reason: HOSPADM

## 2022-04-20 RX ORDER — NEOSTIGMINE METHYLSULFATE 1 MG/ML
INJECTION INTRAVENOUS AS NEEDED
Status: DISCONTINUED | OUTPATIENT
Start: 2022-04-20 | End: 2022-04-20

## 2022-04-20 RX ORDER — POTASSIUM CITRATE 10 MEQ/1
10 TABLET, EXTENDED RELEASE ORAL 2 TIMES DAILY
Status: DISCONTINUED | OUTPATIENT
Start: 2022-04-20 | End: 2022-04-23 | Stop reason: HOSPADM

## 2022-04-20 RX ORDER — MAGNESIUM SULFATE HEPTAHYDRATE 40 MG/ML
2 INJECTION, SOLUTION INTRAVENOUS ONCE
Status: COMPLETED | OUTPATIENT
Start: 2022-04-20 | End: 2022-04-20

## 2022-04-20 RX ORDER — ONDANSETRON 2 MG/ML
4 INJECTION INTRAMUSCULAR; INTRAVENOUS ONCE AS NEEDED
Status: DISCONTINUED | OUTPATIENT
Start: 2022-04-20 | End: 2022-04-20 | Stop reason: HOSPADM

## 2022-04-20 RX ORDER — ACETAMINOPHEN 325 MG/1
975 TABLET ORAL ONCE
Status: COMPLETED | OUTPATIENT
Start: 2022-04-20 | End: 2022-04-20

## 2022-04-20 RX ORDER — FERROUS SULFATE 325(65) MG
325 TABLET ORAL 2 TIMES DAILY WITH MEALS
Status: DISCONTINUED | OUTPATIENT
Start: 2022-04-20 | End: 2022-04-23 | Stop reason: HOSPADM

## 2022-04-20 RX ORDER — SODIUM CHLORIDE, SODIUM LACTATE, POTASSIUM CHLORIDE, CALCIUM CHLORIDE 600; 310; 30; 20 MG/100ML; MG/100ML; MG/100ML; MG/100ML
INJECTION, SOLUTION INTRAVENOUS CONTINUOUS PRN
Status: DISCONTINUED | OUTPATIENT
Start: 2022-04-20 | End: 2022-04-20

## 2022-04-20 RX ORDER — MIDAZOLAM HYDROCHLORIDE 2 MG/2ML
INJECTION, SOLUTION INTRAMUSCULAR; INTRAVENOUS AS NEEDED
Status: DISCONTINUED | OUTPATIENT
Start: 2022-04-20 | End: 2022-04-20

## 2022-04-20 RX ORDER — CEFAZOLIN SODIUM 2 G/50ML
2000 SOLUTION INTRAVENOUS ONCE
Status: COMPLETED | OUTPATIENT
Start: 2022-04-20 | End: 2022-04-20

## 2022-04-20 RX ORDER — OXYCODONE HYDROCHLORIDE 10 MG/1
10 TABLET ORAL EVERY 4 HOURS PRN
Status: DISCONTINUED | OUTPATIENT
Start: 2022-04-20 | End: 2022-04-23 | Stop reason: HOSPADM

## 2022-04-20 RX ORDER — HYDROMORPHONE HCL/PF 1 MG/ML
SYRINGE (ML) INJECTION AS NEEDED
Status: DISCONTINUED | OUTPATIENT
Start: 2022-04-20 | End: 2022-04-20

## 2022-04-20 RX ORDER — ATORVASTATIN CALCIUM 20 MG/1
20 TABLET, FILM COATED ORAL
Status: DISCONTINUED | OUTPATIENT
Start: 2022-04-20 | End: 2022-04-23 | Stop reason: HOSPADM

## 2022-04-20 RX ORDER — SODIUM CHLORIDE, SODIUM LACTATE, POTASSIUM CHLORIDE, CALCIUM CHLORIDE 600; 310; 30; 20 MG/100ML; MG/100ML; MG/100ML; MG/100ML
125 INJECTION, SOLUTION INTRAVENOUS CONTINUOUS
Status: DISCONTINUED | OUTPATIENT
Start: 2022-04-20 | End: 2022-04-23 | Stop reason: HOSPADM

## 2022-04-20 RX ORDER — FAMOTIDINE 20 MG/1
10 TABLET, FILM COATED ORAL DAILY
Status: DISCONTINUED | OUTPATIENT
Start: 2022-04-21 | End: 2022-04-23 | Stop reason: HOSPADM

## 2022-04-20 RX ORDER — LIDOCAINE HYDROCHLORIDE 10 MG/ML
INJECTION, SOLUTION EPIDURAL; INFILTRATION; INTRACAUDAL; PERINEURAL AS NEEDED
Status: DISCONTINUED | OUTPATIENT
Start: 2022-04-20 | End: 2022-04-20

## 2022-04-20 RX ORDER — DOCUSATE SODIUM 100 MG/1
100 CAPSULE, LIQUID FILLED ORAL 2 TIMES DAILY
Status: DISCONTINUED | OUTPATIENT
Start: 2022-04-20 | End: 2022-04-23 | Stop reason: HOSPADM

## 2022-04-20 RX ORDER — METOCLOPRAMIDE HYDROCHLORIDE 5 MG/ML
10 INJECTION INTRAMUSCULAR; INTRAVENOUS ONCE AS NEEDED
Status: DISCONTINUED | OUTPATIENT
Start: 2022-04-20 | End: 2022-04-20 | Stop reason: HOSPADM

## 2022-04-20 RX ORDER — KETAMINE HCL IN NACL, ISO-OSM 100MG/10ML
SYRINGE (ML) INJECTION AS NEEDED
Status: DISCONTINUED | OUTPATIENT
Start: 2022-04-20 | End: 2022-04-20

## 2022-04-20 RX ORDER — ALBUTEROL SULFATE 90 UG/1
1 AEROSOL, METERED RESPIRATORY (INHALATION) EVERY 4 HOURS PRN
Status: DISCONTINUED | OUTPATIENT
Start: 2022-04-20 | End: 2022-04-23 | Stop reason: HOSPADM

## 2022-04-20 RX ORDER — HYDROMORPHONE HCL/PF 1 MG/ML
0.5 SYRINGE (ML) INJECTION
Status: DISCONTINUED | OUTPATIENT
Start: 2022-04-20 | End: 2022-04-20 | Stop reason: HOSPADM

## 2022-04-20 RX ORDER — OXYCODONE HYDROCHLORIDE 5 MG/1
5 TABLET ORAL EVERY 4 HOURS PRN
Status: DISCONTINUED | OUTPATIENT
Start: 2022-04-20 | End: 2022-04-23 | Stop reason: HOSPADM

## 2022-04-20 RX ORDER — ALBUTEROL SULFATE 2.5 MG/3ML
2.5 SOLUTION RESPIRATORY (INHALATION) ONCE AS NEEDED
Status: COMPLETED | OUTPATIENT
Start: 2022-04-20 | End: 2022-04-20

## 2022-04-20 RX ORDER — SENNOSIDES 8.6 MG
1 TABLET ORAL DAILY
Status: DISCONTINUED | OUTPATIENT
Start: 2022-04-21 | End: 2022-04-23 | Stop reason: HOSPADM

## 2022-04-20 RX ORDER — ALBUTEROL SULFATE 90 UG/1
AEROSOL, METERED RESPIRATORY (INHALATION) AS NEEDED
Status: DISCONTINUED | OUTPATIENT
Start: 2022-04-20 | End: 2022-04-20

## 2022-04-20 RX ORDER — CEFAZOLIN SODIUM 2 G/50ML
2000 SOLUTION INTRAVENOUS EVERY 8 HOURS
Status: COMPLETED | OUTPATIENT
Start: 2022-04-20 | End: 2022-04-21

## 2022-04-20 RX ORDER — DEXAMETHASONE SODIUM PHOSPHATE 10 MG/ML
INJECTION, SOLUTION INTRAMUSCULAR; INTRAVENOUS AS NEEDED
Status: DISCONTINUED | OUTPATIENT
Start: 2022-04-20 | End: 2022-04-20

## 2022-04-20 RX ORDER — FLUCONAZOLE 100 MG/1
200 TABLET ORAL DAILY
Status: DISCONTINUED | OUTPATIENT
Start: 2022-04-21 | End: 2022-04-23 | Stop reason: HOSPADM

## 2022-04-20 RX ORDER — INSULIN GLARGINE 100 [IU]/ML
10 INJECTION, SOLUTION SUBCUTANEOUS
Status: DISCONTINUED | OUTPATIENT
Start: 2022-04-20 | End: 2022-04-23 | Stop reason: HOSPADM

## 2022-04-20 RX ORDER — GABAPENTIN 100 MG/1
100 CAPSULE ORAL 3 TIMES DAILY
Status: DISCONTINUED | OUTPATIENT
Start: 2022-04-20 | End: 2022-04-23 | Stop reason: HOSPADM

## 2022-04-20 RX ORDER — ROCURONIUM BROMIDE 10 MG/ML
INJECTION, SOLUTION INTRAVENOUS AS NEEDED
Status: DISCONTINUED | OUTPATIENT
Start: 2022-04-20 | End: 2022-04-20

## 2022-04-20 RX ORDER — OXYBUTYNIN CHLORIDE 5 MG/1
5 TABLET ORAL 3 TIMES DAILY PRN
Status: DISCONTINUED | OUTPATIENT
Start: 2022-04-20 | End: 2022-04-23 | Stop reason: HOSPADM

## 2022-04-20 RX ORDER — SODIUM CHLORIDE, SODIUM LACTATE, POTASSIUM CHLORIDE, CALCIUM CHLORIDE 600; 310; 30; 20 MG/100ML; MG/100ML; MG/100ML; MG/100ML
20 INJECTION, SOLUTION INTRAVENOUS CONTINUOUS
Status: DISCONTINUED | OUTPATIENT
Start: 2022-04-20 | End: 2022-04-20

## 2022-04-20 RX ORDER — FENTANYL CITRATE/PF 50 MCG/ML
25 SYRINGE (ML) INJECTION
Status: DISCONTINUED | OUTPATIENT
Start: 2022-04-20 | End: 2022-04-20 | Stop reason: HOSPADM

## 2022-04-20 RX ORDER — GLYCOPYRROLATE 0.2 MG/ML
INJECTION INTRAMUSCULAR; INTRAVENOUS AS NEEDED
Status: DISCONTINUED | OUTPATIENT
Start: 2022-04-20 | End: 2022-04-20

## 2022-04-20 RX ORDER — ONDANSETRON 2 MG/ML
4 INJECTION INTRAMUSCULAR; INTRAVENOUS EVERY 6 HOURS PRN
Status: DISCONTINUED | OUTPATIENT
Start: 2022-04-20 | End: 2022-04-23 | Stop reason: HOSPADM

## 2022-04-20 RX ORDER — ONDANSETRON 2 MG/ML
INJECTION INTRAMUSCULAR; INTRAVENOUS AS NEEDED
Status: DISCONTINUED | OUTPATIENT
Start: 2022-04-20 | End: 2022-04-20

## 2022-04-20 RX ORDER — ALBUMIN, HUMAN INJ 5% 5 %
SOLUTION INTRAVENOUS CONTINUOUS PRN
Status: DISCONTINUED | OUTPATIENT
Start: 2022-04-20 | End: 2022-04-20

## 2022-04-20 RX ORDER — MONTELUKAST SODIUM 10 MG/1
10 TABLET ORAL EVERY EVENING
Status: DISCONTINUED | OUTPATIENT
Start: 2022-04-20 | End: 2022-04-23 | Stop reason: HOSPADM

## 2022-04-20 RX ORDER — PROPOFOL 10 MG/ML
INJECTION, EMULSION INTRAVENOUS AS NEEDED
Status: DISCONTINUED | OUTPATIENT
Start: 2022-04-20 | End: 2022-04-20

## 2022-04-20 RX ORDER — ACETAMINOPHEN 325 MG/1
650 TABLET ORAL EVERY 6 HOURS SCHEDULED
Status: DISCONTINUED | OUTPATIENT
Start: 2022-04-20 | End: 2022-04-23 | Stop reason: HOSPADM

## 2022-04-20 RX ORDER — FENTANYL CITRATE 50 UG/ML
INJECTION, SOLUTION INTRAMUSCULAR; INTRAVENOUS AS NEEDED
Status: DISCONTINUED | OUTPATIENT
Start: 2022-04-20 | End: 2022-04-20

## 2022-04-20 RX ADMIN — Medication 30 MG: at 13:29

## 2022-04-20 RX ADMIN — GLYCOPYRROLATE 0.4 MG: 0.2 INJECTION, SOLUTION INTRAMUSCULAR; INTRAVENOUS at 16:07

## 2022-04-20 RX ADMIN — ATORVASTATIN CALCIUM 20 MG: 20 TABLET, FILM COATED ORAL at 21:53

## 2022-04-20 RX ADMIN — ACETAMINOPHEN 650 MG: 325 TABLET, FILM COATED ORAL at 18:38

## 2022-04-20 RX ADMIN — MIDAZOLAM HYDROCHLORIDE 2 MG: 1 INJECTION, SOLUTION INTRAMUSCULAR; INTRAVENOUS at 13:20

## 2022-04-20 RX ADMIN — FERROUS SULFATE TAB 325 MG (65 MG ELEMENTAL FE) 325 MG: 325 (65 FE) TAB at 18:38

## 2022-04-20 RX ADMIN — PROPOFOL 20 MG: 10 INJECTION, EMULSION INTRAVENOUS at 16:09

## 2022-04-20 RX ADMIN — ONDANSETRON 4 MG: 2 INJECTION INTRAMUSCULAR; INTRAVENOUS at 15:55

## 2022-04-20 RX ADMIN — Medication 10 MG: at 15:30

## 2022-04-20 RX ADMIN — ALBUTEROL SULFATE 2 PUFF: 90 AEROSOL, METERED RESPIRATORY (INHALATION) at 13:35

## 2022-04-20 RX ADMIN — PROPOFOL 20 MG: 10 INJECTION, EMULSION INTRAVENOUS at 16:15

## 2022-04-20 RX ADMIN — HYDROMORPHONE HYDROCHLORIDE 0.5 MG: 1 INJECTION, SOLUTION INTRAMUSCULAR; INTRAVENOUS; SUBCUTANEOUS at 14:09

## 2022-04-20 RX ADMIN — SODIUM CHLORIDE, SODIUM LACTATE, POTASSIUM CHLORIDE, AND CALCIUM CHLORIDE 1000 ML: .6; .31; .03; .02 INJECTION, SOLUTION INTRAVENOUS at 23:51

## 2022-04-20 RX ADMIN — OXYCODONE HYDROCHLORIDE 5 MG: 5 TABLET ORAL at 23:53

## 2022-04-20 RX ADMIN — MAGNESIUM SULFATE IN WATER 2 G: 40 INJECTION, SOLUTION INTRAVENOUS at 13:50

## 2022-04-20 RX ADMIN — FENTANYL CITRATE 25 MCG: 50 INJECTION INTRAMUSCULAR; INTRAVENOUS at 17:40

## 2022-04-20 RX ADMIN — PROPOFOL 200 MG: 10 INJECTION, EMULSION INTRAVENOUS at 13:29

## 2022-04-20 RX ADMIN — FENTANYL CITRATE 25 MCG: 50 INJECTION, SOLUTION INTRAMUSCULAR; INTRAVENOUS at 14:18

## 2022-04-20 RX ADMIN — FENTANYL CITRATE 25 MCG: 50 INJECTION, SOLUTION INTRAMUSCULAR; INTRAVENOUS at 15:58

## 2022-04-20 RX ADMIN — LIDOCAINE HYDROCHLORIDE 50 MG: 10 INJECTION, SOLUTION EPIDURAL; INFILTRATION; INTRACAUDAL at 13:29

## 2022-04-20 RX ADMIN — GABAPENTIN 100 MG: 100 CAPSULE ORAL at 21:53

## 2022-04-20 RX ADMIN — FENTANYL CITRATE 25 MCG: 50 INJECTION, SOLUTION INTRAMUSCULAR; INTRAVENOUS at 15:59

## 2022-04-20 RX ADMIN — ALBUTEROL SULFATE 8 PUFF: 90 AEROSOL, METERED RESPIRATORY (INHALATION) at 16:10

## 2022-04-20 RX ADMIN — DEXAMETHASONE SODIUM PHOSPHATE 10 MG: 10 INJECTION, SOLUTION INTRAMUSCULAR; INTRAVENOUS at 14:38

## 2022-04-20 RX ADMIN — Medication 10 MG: at 14:27

## 2022-04-20 RX ADMIN — SUGAMMADEX 175 MG: 100 INJECTION, SOLUTION INTRAVENOUS at 16:30

## 2022-04-20 RX ADMIN — ACETAMINOPHEN 975 MG: 325 TABLET ORAL at 12:02

## 2022-04-20 RX ADMIN — ALBUMIN HUMAN: 0.05 INJECTION, SOLUTION INTRAVENOUS at 15:36

## 2022-04-20 RX ADMIN — ROCURONIUM BROMIDE 50 MG: 10 SOLUTION INTRAVENOUS at 13:30

## 2022-04-20 RX ADMIN — MONTELUKAST 10 MG: 10 TABLET, FILM COATED ORAL at 18:38

## 2022-04-20 RX ADMIN — Medication 10 MG: at 15:52

## 2022-04-20 RX ADMIN — SODIUM CHLORIDE, SODIUM LACTATE, POTASSIUM CHLORIDE, AND CALCIUM CHLORIDE: .6; .31; .03; .02 INJECTION, SOLUTION INTRAVENOUS at 12:45

## 2022-04-20 RX ADMIN — FENTANYL CITRATE 25 MCG: 50 INJECTION INTRAMUSCULAR; INTRAVENOUS at 17:33

## 2022-04-20 RX ADMIN — POTASSIUM CITRATE 10 MEQ: 10 TABLET, EXTENDED RELEASE ORAL at 22:19

## 2022-04-20 RX ADMIN — NEOSTIGMINE METHYLSULFATE 3 MG: 1 INJECTION INTRAVENOUS at 16:07

## 2022-04-20 RX ADMIN — DOCUSATE SODIUM 100 MG: 100 CAPSULE, LIQUID FILLED ORAL at 18:37

## 2022-04-20 RX ADMIN — PHENYLEPHRINE HYDROCHLORIDE 40 MCG/MIN: 10 INJECTION INTRAVENOUS at 15:34

## 2022-04-20 RX ADMIN — CEFAZOLIN SODIUM 2000 MG: 2 SOLUTION INTRAVENOUS at 13:34

## 2022-04-20 RX ADMIN — FENTANYL CITRATE 50 MCG: 50 INJECTION, SOLUTION INTRAMUSCULAR; INTRAVENOUS at 13:33

## 2022-04-20 RX ADMIN — FENTANYL CITRATE 50 MCG: 50 INJECTION, SOLUTION INTRAMUSCULAR; INTRAVENOUS at 13:29

## 2022-04-20 RX ADMIN — ALBUTEROL SULFATE 2.5 MG: 2.5 SOLUTION RESPIRATORY (INHALATION) at 17:04

## 2022-04-20 RX ADMIN — CEFAZOLIN SODIUM 2000 MG: 2 SOLUTION INTRAVENOUS at 21:55

## 2022-04-20 RX ADMIN — SODIUM CHLORIDE, SODIUM LACTATE, POTASSIUM CHLORIDE, AND CALCIUM CHLORIDE 125 ML/HR: .6; .31; .03; .02 INJECTION, SOLUTION INTRAVENOUS at 18:38

## 2022-04-20 RX ADMIN — ACETAMINOPHEN 650 MG: 325 TABLET, FILM COATED ORAL at 23:26

## 2022-04-20 RX ADMIN — INSULIN GLARGINE 10 UNITS: 100 INJECTION, SOLUTION SUBCUTANEOUS at 21:39

## 2022-04-20 NOTE — ANESTHESIA PREPROCEDURE EVALUATION
Procedure:  NEPHROLITHOTOMY  PERCUTANEOUS (PCNL) (Left Abdomen)    Relevant Problems   CARDIO   (+) Benign essential hypertension   (+) Hyperlipidemia      /RENAL   (+) Kidney stone   (+) Staghorn renal calculus   (+) Uric acid nephrolithiasis      HEMATOLOGY   (+) Anemia      NEURO/PSYCH   (+) Paresthesia      PULMONARY   (+) Asthma   (+) Mild persistent asthma   (+) Moderate obstructive sleep apnea      Other   (+) Incomplete emptying of bladder   (+) Restrictive airway disease   (+) Uncontrolled type 2 diabetes mellitus with hyperglycemia (HCC)     Lab Results   Component Value Date    WBC 13 46 (H) 03/10/2022    HGB 8 9 (L) 03/10/2022    HCT 29 1 (L) 03/10/2022    MCV 84 03/10/2022     (H) 03/10/2022     Lab Results   Component Value Date     08/04/2017    SODIUM 140 03/09/2022    K 3 9 03/09/2022     03/09/2022    CO2 23 03/09/2022    AGAP 9 03/09/2022    BUN 15 03/09/2022    CREATININE 0 93 03/09/2022    GLUC 131 03/09/2022    GLUF 144 (H) 03/08/2022    CALCIUM 8 7 03/09/2022    AST 14 02/21/2022    ALT 23 02/21/2022    ALKPHOS 135 (H) 02/21/2022    PROT 7 6 08/04/2017    TP 7 1 02/21/2022    BILITOT 0 4 08/04/2017    TBILI 0 16 (L) 02/21/2022    EGFR 67 03/09/2022          Physical Exam    Airway    Mallampati score: III  TM Distance: <3 FB  Neck ROM: full     Dental   No notable dental hx     Cardiovascular      Pulmonary      Other Findings        Anesthesia Plan  ASA Score- 3     Anesthesia Type- general with ASA Monitors  Additional Monitors:   Airway Plan: ETT  Plan Factors-Exercise tolerance (METS): >4 METS  Chart reviewed  EKG reviewed  Imaging results reviewed  Existing labs reviewed  Patient summary reviewed  Patient is not a current smoker  Patient did not smoke on day of surgery  Induction- intravenous  Postoperative Plan- Plan for postoperative opioid use   Planned trial extubation    Informed Consent- Anesthetic plan and risks discussed with patient  I personally reviewed this patient with the CRNA  Discussed and agreed on the Anesthesia Plan with the SHELLI Coffman

## 2022-04-20 NOTE — INTERVAL H&P NOTE
H&P reviewed  After examining the patient I find no changes in the patients condition since the H&P had been written  Vitals:    04/20/22 1150   BP: 130/78   Pulse: (!) 112   Resp: 18   Temp: 98 9 °F (37 2 °C)   SpO2: 98%     The patient had a yeast infection or urine which is being treated with antifungal  She had left lower pole nephroureteral catheter placed by Radiology yesterday  She has more back pain since this procedure yesterday  She reports discomfort with urination for the last 2 weeks  Exam  No acute distress  Abdomen soft nontender  Back shows left nephro ureteral stent  Regular rate and rhythm  Lungs:   No wheezing

## 2022-04-20 NOTE — ANESTHESIA POSTPROCEDURE EVALUATION
Post-Op Assessment Note    CV Status:  Stable  Pain Score: 0    Pain management: adequate     Mental Status:  Alert and awake   Hydration Status:  Euvolemic and stable   PONV Controlled:  Controlled   Airway Patency:  Patent and adequate      Post Op Vitals Reviewed: Yes      Staff: CRNA         No complications documented      /57 (04/20/22 1631)    Temp 97 7 °F (36 5 °C) (04/20/22 1631)    Pulse 100 (04/20/22 1631)   Resp 16 (04/20/22 1631)    SpO2 100 % (04/20/22 1631)

## 2022-04-20 NOTE — OP NOTE
OPERATIVE REPORT  PATIENT NAME: Asia Lazaro    :  1965  MRN: [id number]  Pt Location: AN OR ROOM 04    SURGERY DATE: 2022    Surgeon(s) and Role:     * Dilcia Jon MD - Primary    Preop Diagnosis:  Left Staghorn renal calculus [N20 0]    Post-Op Diagnosis Codes:     * Left Staghorn renal calculus [N20 0]    Procedure(s) (LRB):  NEPHROLITHOTOMY  PERCUTANEOUS (PCNL) (Left)    Specimen(s):  ID Type Source Tests Collected by Time Destination   A : LEFT KIDNEY CALCULI Calculus Kidney, Left STONE ANALYSIS Dilcia Jon MD 2022 1614        Estimated Blood Loss:   300 mL    Drains:  Nephrostomy Left 8 Fr  (Active)   Number of days: 1       Ureteral Drain/Stent Left ureter 6 Fr  (Active)   Number of days: 44       Percutaneous Nephroureteral Tube (PCNU) Left 1 4 Fr 65 Cm (Active)   Number of days: 1       Percutaneous Nephroureteral Tube (PCNU) Left 1 8 5 Fr 25 Cm (Active)   Number of days: 0       Anesthesia Type:   General    Operative Indications:  Patient with large staghorn calculus status post PCNL with large residual stone burden I cannot be accessed during initial surgery now with a lower pole access to facilitate treatment of residual stone burden    Operative Findings:  Patient had persistent bleeding from the beginning of the case associated with inflamed urothelium  Large stone burden treated and removed  New softer stent placed  New lower pole PCN placed  May possibly be helped with stent based on fluoroscopy    Complications:   None    Procedure and Technique:  The patient was identified in the pre-op holding area  We again went over the benefits and risks of PCNL surgery to include bleeding, infection, bowel or vascular injury, renal injury, need for secondary procedures including additional stone management  Informed consent was obtained  She was brought to the operating room and placed supine in the operating room theater  General anesthesia was administered    A hurt catheter was placed  She was then placed prone on the fluoroscopic table taking care to pad all contact points  She was secured to the bed with tape and straps  The patient had previously obtained lower pole percutaneous access to the kidney by intervention radiology which was dictated in a separate report  She also had upper pole PCN from prior surgery which was capped  The patient was prepped and draped in standard sterile fashion  A timeout was performed identifying the correct patient site and procedure  An antegrade nephrostogram was performed through the patient's upper pole PCN showing the collecting system and large stone burden in the renal pelvis as well as stent in proper position  A guidewire was inserted into the bladder via his existing nephroureteral stent and passed down to the bladder under fluoroscopic vision  The stent was removed and a dual-lumen catheter was then passed over the wire in place to facilitate a secondary superstiff guidewire being placed  The skin incision was sharply extended to approximately 1 cm  A balloon dilator was then passed over the superstiff wire and positioned fluoroscopically such that the distal tip of the balloon was just within the calyx  It was inflated to 13 mmHg and a sheath was inserted over the balloon to the same location  The balloon was taken down and remove  Mild immediate bleeding was appreciated  The offset nephroscope was then passed through the tract into the collecting system  There was some difficulty with visualization secondary to bleeding from unclear etiology  Radha Aguilar was encountered in a lower pole calyx and in the renal pelvis  The stone burdens were subsequently fragmented with the use of the trilogy lithotripter which was passed through the nephroscope  The lower pole stone burden was not large but the renal pelvis stone burden was impacted into the surrounding tissue and quite voluminous    After use of trilogy Lithotripter the large stone burden was primarily evacuated but also resulted in small pieces  Multiple passes were made with graspers in order to retrieve stone fragments  Sequential calyces were inspected and stones cleared  There was still bleeding appreciated from the mucosa primarily in the lower pole so a 5 Western Naz Bugbee was used with the aid of water irrigation to cauterize several bleeding points with good effect  The nephroscope was then removed and a flexible cystoscope was introduced to further evaluate the colleting system  Subsequent fluoroscopy demonstrated near complete extraction of the stone burden  Therefore at this time attention was turned to exchanging stent  The patient's old stent was grasped and removed through the access sheath  Then a new Black beauty stent was passed through the nephroscope through one of the guidewires was passed the scope  A 6 x 24 double-J stent was passed over the guidewire and positioned based on visual guidance in the collecting system and fluoroscopic guidance in the bladder  Thereafter a nephroureteral reentry catheter was passed via the sheath  Antegrade nephrostogram through upper pole PCN confirmed appropriate positioning as well  Therefore the sheath was removed  The new percutaneous nephrostomy tube was secured and skin was closed with a silk suture  Pressure dressing was applied and patient awakened from anesthesia having tolerated the procedure well  A qualified resident physician was not available    Patient Disposition:  PACU     PLAN:  We will plan to cap the patient's PCNs sequentially tomorrow  If she does well with this we will remove the upper pole PCN  Plan to discharge home with stent and lower pole PCN with subsequent removal of both in 1-2 weeks    This is because if there is concern that the upper pole stent coil may be entangled with the lower pole PCN and therefore removal of lower pole PCN could result in proximal migration of the stent so will plan to remove stent first and then PCN      SIGNATURE: Dick Erwin MD  DATE: April 20, 2022  TIME: 4:37 PM

## 2022-04-21 LAB
ABO GROUP BLD BPU: NORMAL
ANION GAP SERPL CALCULATED.3IONS-SCNC: 7 MMOL/L (ref 4–13)
BPU ID: NORMAL
BUN SERPL-MCNC: 18 MG/DL (ref 5–25)
CALCIUM SERPL-MCNC: 8.1 MG/DL (ref 8.3–10.1)
CHLORIDE SERPL-SCNC: 106 MMOL/L (ref 100–108)
CO2 SERPL-SCNC: 25 MMOL/L (ref 21–32)
CREAT SERPL-MCNC: 1.07 MG/DL (ref 0.6–1.3)
CROSSMATCH: NORMAL
ERYTHROCYTE [DISTWIDTH] IN BLOOD BY AUTOMATED COUNT: 15.1 % (ref 11.6–15.1)
GFR SERPL CREATININE-BSD FRML MDRD: 57 ML/MIN/1.73SQ M
GLUCOSE P FAST SERPL-MCNC: 222 MG/DL (ref 65–99)
GLUCOSE SERPL-MCNC: 176 MG/DL (ref 65–140)
GLUCOSE SERPL-MCNC: 177 MG/DL (ref 65–140)
GLUCOSE SERPL-MCNC: 200 MG/DL (ref 65–140)
GLUCOSE SERPL-MCNC: 221 MG/DL (ref 65–140)
GLUCOSE SERPL-MCNC: 222 MG/DL (ref 65–140)
GLUCOSE SERPL-MCNC: 256 MG/DL (ref 65–140)
HCT VFR BLD AUTO: 22.6 % (ref 34.8–46.1)
HGB BLD-MCNC: 7 G/DL (ref 11.5–15.4)
MCH RBC QN AUTO: 25.3 PG (ref 26.8–34.3)
MCHC RBC AUTO-ENTMCNC: 31 G/DL (ref 31.4–37.4)
MCV RBC AUTO: 82 FL (ref 82–98)
PLATELET # BLD AUTO: 293 THOUSANDS/UL (ref 149–390)
PMV BLD AUTO: 9.7 FL (ref 8.9–12.7)
POTASSIUM SERPL-SCNC: 4.7 MMOL/L (ref 3.5–5.3)
RBC # BLD AUTO: 2.77 MILLION/UL (ref 3.81–5.12)
SODIUM SERPL-SCNC: 138 MMOL/L (ref 136–145)
UNIT DISPENSE STATUS: NORMAL
UNIT PRODUCT CODE: NORMAL
UNIT PRODUCT VOLUME: 350 ML
UNIT RH: NORMAL
WBC # BLD AUTO: 8.52 THOUSAND/UL (ref 4.31–10.16)

## 2022-04-21 PROCEDURE — 85027 COMPLETE CBC AUTOMATED: CPT | Performed by: UROLOGY

## 2022-04-21 PROCEDURE — 80048 BASIC METABOLIC PNL TOTAL CA: CPT | Performed by: UROLOGY

## 2022-04-21 PROCEDURE — 82948 REAGENT STRIP/BLOOD GLUCOSE: CPT

## 2022-04-21 PROCEDURE — P9016 RBC LEUKOCYTES REDUCED: HCPCS

## 2022-04-21 PROCEDURE — 99024 POSTOP FOLLOW-UP VISIT: CPT | Performed by: UROLOGY

## 2022-04-21 PROCEDURE — 30233N1 TRANSFUSION OF NONAUTOLOGOUS RED BLOOD CELLS INTO PERIPHERAL VEIN, PERCUTANEOUS APPROACH: ICD-10-PCS | Performed by: UROLOGY

## 2022-04-21 RX ORDER — BUTALBITAL, ACETAMINOPHEN AND CAFFEINE 50; 325; 40 MG/1; MG/1; MG/1
1 TABLET ORAL EVERY 4 HOURS PRN
Status: DISCONTINUED | OUTPATIENT
Start: 2022-04-21 | End: 2022-04-23 | Stop reason: HOSPADM

## 2022-04-21 RX ADMIN — OXYCODONE HYDROCHLORIDE 5 MG: 5 TABLET ORAL at 10:24

## 2022-04-21 RX ADMIN — ONDANSETRON 4 MG: 2 INJECTION INTRAMUSCULAR; INTRAVENOUS at 16:25

## 2022-04-21 RX ADMIN — SODIUM CHLORIDE, SODIUM LACTATE, POTASSIUM CHLORIDE, AND CALCIUM CHLORIDE 125 ML/HR: .6; .31; .03; .02 INJECTION, SOLUTION INTRAVENOUS at 11:42

## 2022-04-21 RX ADMIN — GABAPENTIN 100 MG: 100 CAPSULE ORAL at 08:28

## 2022-04-21 RX ADMIN — ATORVASTATIN CALCIUM 20 MG: 20 TABLET, FILM COATED ORAL at 21:37

## 2022-04-21 RX ADMIN — FAMOTIDINE 10 MG: 20 TABLET ORAL at 08:28

## 2022-04-21 RX ADMIN — MONTELUKAST 10 MG: 10 TABLET, FILM COATED ORAL at 18:13

## 2022-04-21 RX ADMIN — ACETAMINOPHEN 650 MG: 325 TABLET, FILM COATED ORAL at 18:13

## 2022-04-21 RX ADMIN — CEFAZOLIN SODIUM 2000 MG: 2 SOLUTION INTRAVENOUS at 05:03

## 2022-04-21 RX ADMIN — ACETAMINOPHEN 650 MG: 325 TABLET, FILM COATED ORAL at 11:31

## 2022-04-21 RX ADMIN — SENNOSIDES 8.6 MG: 8.6 TABLET, FILM COATED ORAL at 08:28

## 2022-04-21 RX ADMIN — DOCUSATE SODIUM 100 MG: 100 CAPSULE, LIQUID FILLED ORAL at 08:27

## 2022-04-21 RX ADMIN — INSULIN LISPRO 3 UNITS: 100 INJECTION, SOLUTION INTRAVENOUS; SUBCUTANEOUS at 18:36

## 2022-04-21 RX ADMIN — INSULIN LISPRO 2 UNITS: 100 INJECTION, SOLUTION INTRAVENOUS; SUBCUTANEOUS at 08:24

## 2022-04-21 RX ADMIN — BUTALBITAL, ACETAMINOPHEN AND CAFFEINE 1 TABLET: 50; 325; 40 TABLET ORAL at 16:44

## 2022-04-21 RX ADMIN — OXYCODONE HYDROCHLORIDE 10 MG: 10 TABLET ORAL at 18:18

## 2022-04-21 RX ADMIN — INSULIN GLARGINE 10 UNITS: 100 INJECTION, SOLUTION SUBCUTANEOUS at 21:47

## 2022-04-21 RX ADMIN — GABAPENTIN 100 MG: 100 CAPSULE ORAL at 21:37

## 2022-04-21 RX ADMIN — INSULIN LISPRO 1 UNITS: 100 INJECTION, SOLUTION INTRAVENOUS; SUBCUTANEOUS at 11:28

## 2022-04-21 RX ADMIN — POTASSIUM CITRATE 10 MEQ: 10 TABLET, EXTENDED RELEASE ORAL at 10:25

## 2022-04-21 RX ADMIN — FERROUS SULFATE TAB 325 MG (65 MG ELEMENTAL FE) 325 MG: 325 (65 FE) TAB at 16:44

## 2022-04-21 RX ADMIN — DOCUSATE SODIUM 100 MG: 100 CAPSULE, LIQUID FILLED ORAL at 18:13

## 2022-04-21 RX ADMIN — POTASSIUM CITRATE 10 MEQ: 10 TABLET, EXTENDED RELEASE ORAL at 21:37

## 2022-04-21 RX ADMIN — OXYCODONE HYDROCHLORIDE 5 MG: 5 TABLET ORAL at 04:06

## 2022-04-21 RX ADMIN — SODIUM CHLORIDE, SODIUM LACTATE, POTASSIUM CHLORIDE, AND CALCIUM CHLORIDE 1000 ML: .6; .31; .03; .02 INJECTION, SOLUTION INTRAVENOUS at 01:13

## 2022-04-21 RX ADMIN — BUTALBITAL, ACETAMINOPHEN AND CAFFEINE 1 TABLET: 50; 325; 40 TABLET ORAL at 05:03

## 2022-04-21 RX ADMIN — FLUCONAZOLE 200 MG: 100 TABLET ORAL at 08:28

## 2022-04-21 RX ADMIN — GABAPENTIN 100 MG: 100 CAPSULE ORAL at 16:44

## 2022-04-21 RX ADMIN — SODIUM CHLORIDE, SODIUM LACTATE, POTASSIUM CHLORIDE, AND CALCIUM CHLORIDE 125 ML/HR: .6; .31; .03; .02 INJECTION, SOLUTION INTRAVENOUS at 04:10

## 2022-04-21 RX ADMIN — FERROUS SULFATE TAB 325 MG (65 MG ELEMENTAL FE) 325 MG: 325 (65 FE) TAB at 08:27

## 2022-04-21 NOTE — PLAN OF CARE
Problem: MOBILITY - ADULT  Goal: Maintain or return to baseline ADL function  Description: INTERVENTIONS:  -  Assess patient's ability to carry out ADLs; assess patient's baseline for ADL function and identify physical deficits which impact ability to perform ADLs (bathing, care of mouth/teeth, toileting, grooming, dressing, etc )  - Assess/evaluate cause of self-care deficits   - Assess range of motion  - Assess patient's mobility; develop plan if impaired  - Assess patient's need for assistive devices and provide as appropriate  - Encourage maximum independence but intervene and supervise when necessary  - Involve family in performance of ADLs  - Assess for home care needs following discharge   - Consider OT consult to assist with ADL evaluation and planning for discharge  - Provide patient education as appropriate  Outcome: Progressing  Goal: Maintains/Returns to pre admission functional level  Description: INTERVENTIONS:  - Perform BMAT or MOVE assessment daily    - Set and communicate daily mobility goal to care team and patient/family/caregiver     - Collaborate with rehabilitation services on mobility goals if consulted  - Out of bed for toileting    Outcome: Progressing

## 2022-04-21 NOTE — PROGRESS NOTES
Progress Note - Phil Guerrero 60 y o  female MRN: [id number]    Unit/Bed#: S -24 Encounter: [id number]      Assessment:  Deloris Leon is a 59 yo female POD #2 PCNL with AS- 2nd stage left PCNL  Original upper pole PCN was placed 3/7/22, and new lower pole PCN and utereral stent placed 4/19/22  PBRC given on 4/20 due to persistent bleeding during procedure  - Hemoglobin 7, from 6 7 after 2PRBCs given 4/20  - Output from PCNs light pink color  There is leaking from PCN site, soaking the dressing in place  Patient c/o pain at the PCN site  - Indwelling hurt is patent and draining clear yellow urine    Plan:  - Clamp top PCN and remove bag today  Remain inpatient overnight to observe output  - D/C indwelling hurt  - If patient able to have good output, clamp lower pole but remain in place upon discharge  - Removal of lower pole PCN and stent outpatient in 1-2 weeks  - AM labs to monitor renal function and hemoglobin  - 1 unit PRBCs ordered for nonemergent infusion    Subjective:   HPI: Deloris Leon is a 59 yo female with PMH of nephrolithiasis  She is known to the Urology service, with lithotripsy surgeries completed by Dr Elizabet Gerardo back in 2016  Patient was admitted on 3/7/22 for PCNL and stent insertion for staghorn calculus  L PCNU was capped upon discharge  Due to stone burden, patient underwent 2nd stage left PCNL for treatment and stone removal      Patient is seen in bed this morning, she is alert and oriented, complains of pain at the PCN site  C/o of a migraine earlier at 5am but received Fiorcet which relieved her symptoms  Objective:     Vitals: Blood pressure 107/63, pulse 79, temperature 99 °F (37 2 °C), resp  rate 16, height 5' 4" (1 626 m), weight 78 5 kg (173 lb 1 oz), SpO2 98 %  ,Body mass index is 29 71 kg/m²        Intake/Output Summary (Last 24 hours) at 4/21/2022 0841  Last data filed at 4/21/2022 0811  Gross per 24 hour   Intake 5671 67 ml   Output 4525 ml   Net 1146 67 ml Physical Exam: General appearance: alert and oriented, in no acute distress and cooperative  Head: Normocephalic, without obvious abnormality, atraumatic  Back: symmetric, no curvature  ROM normal  No CVA tenderness , L PCNL, upper and lower pole  Lungs: clear to auscultation bilaterally  Abdomen: soft, non-tender; bowel sounds normal; no masses,  no organomegaly  Extremities: extremities normal, warm and well-perfused; no cyanosis, clubbing, or edema  Skin: Skin color, texture, turgor normal  No rashes or lesions  Neurologic: Grossly normal     Invasive Devices  Report    Peripheral Intravenous Line            Peripheral IV 04/20/22 Right Antecubital <1 day          Drain            Percutaneous Nephroureteral Tube (PCNU) Left 2 8 Fr 24 Cm 45 days    Nephrostomy Left 8 Fr  1 day    Percutaneous Nephroureteral Tube (PCNU) Left 1 4 Fr 65 Cm 1 day    Percutaneous Nephroureteral Tube (PCNU) Left 2 8 Fr 24 Cm 1 day    Percutaneous Nephroureteral Tube (PCNU) Left 1 8 5 Fr 25 Cm <1 day    Ureteral Internal Stent Left ureter 6 Fr  <1 day    Urethral Catheter Latex 18 Fr  <1 day                Results for Dana Haq (MRN [id number]) as of 4/21/2022 11:20   Ref  Range 4/21/2022 05:39   Sodium Latest Ref Range: 136 - 145 mmol/L 138   Potassium Latest Ref Range: 3 5 - 5 3 mmol/L 4 7   Chloride Latest Ref Range: 100 - 108 mmol/L 106   CO2 Latest Ref Range: 21 - 32 mmol/L 25   Anion Gap Latest Ref Range: 4 - 13 mmol/L 7   BUN Latest Ref Range: 5 - 25 mg/dL 18   Creatinine Latest Ref Range: 0 60 - 1 30 mg/dL 1 07   Glucose, Random Latest Ref Range: 65 - 140 mg/dL 222 (H)   GLUCOSE FASTING Latest Ref Range: 65 - 99 mg/dL 222 (H)   Calcium Latest Ref Range: 8 3 - 10 1 mg/dL 8 1 (L)   eGFR Latest Units: ml/min/1 73sq m 57     Results for Dana Haq (MRN [id number]) as of 4/21/2022 11:20   Ref   Range 4/21/2022 05:39   WBC Latest Ref Range: 4 31 - 10 16 Thousand/uL 8 52   Red Blood Cell Count Latest Ref Range: 3 81 - 5 12 Million/uL 2 77 (L)   Hemoglobin Latest Ref Range: 11 5 - 15 4 g/dL 7 0 (L)   HCT Latest Ref Range: 34 8 - 46 1 % 22 6 (L)   MCV Latest Ref Range: 82 - 98 fL 82   MCH Latest Ref Range: 26 8 - 34 3 pg 25 3 (L)   MCHC Latest Ref Range: 31 4 - 37 4 g/dL 31 0 (L)   RDW Latest Ref Range: 11 6 - 15 1 % 15 1   Platelet Count Latest Ref Range: 149 - 390 Thousands/uL 293   MPV Latest Ref Range: 8 9 - 12 7 fL 9 7         Lab, Imaging and other studies: I have personally reviewed pertinent reports      VTE Pharmacologic Prophylaxis: Sequential compression device (Venodyne)   VTE Mechanical Prophylaxis: sequential compression device

## 2022-04-22 ENCOUNTER — APPOINTMENT (INPATIENT)
Dept: RADIOLOGY | Facility: HOSPITAL | Age: 57
DRG: 443 | End: 2022-04-22
Payer: MEDICARE

## 2022-04-22 LAB
ANION GAP SERPL CALCULATED.3IONS-SCNC: 4 MMOL/L (ref 4–13)
BUN SERPL-MCNC: 19 MG/DL (ref 5–25)
CALCIUM SERPL-MCNC: 8.2 MG/DL (ref 8.3–10.1)
CHLORIDE SERPL-SCNC: 105 MMOL/L (ref 100–108)
CO2 SERPL-SCNC: 30 MMOL/L (ref 21–32)
CREAT SERPL-MCNC: 1.01 MG/DL (ref 0.6–1.3)
ERYTHROCYTE [DISTWIDTH] IN BLOOD BY AUTOMATED COUNT: 15 % (ref 11.6–15.1)
GFR SERPL CREATININE-BSD FRML MDRD: 61 ML/MIN/1.73SQ M
GLUCOSE P FAST SERPL-MCNC: 133 MG/DL (ref 65–99)
GLUCOSE SERPL-MCNC: 133 MG/DL (ref 65–140)
GLUCOSE SERPL-MCNC: 136 MG/DL (ref 65–140)
GLUCOSE SERPL-MCNC: 142 MG/DL (ref 65–140)
GLUCOSE SERPL-MCNC: 150 MG/DL (ref 65–140)
GLUCOSE SERPL-MCNC: 169 MG/DL (ref 65–140)
GLUCOSE SERPL-MCNC: 205 MG/DL (ref 65–140)
HCT VFR BLD AUTO: 24.8 % (ref 34.8–46.1)
HGB BLD-MCNC: 7.8 G/DL (ref 11.5–15.4)
MCH RBC QN AUTO: 26 PG (ref 26.8–34.3)
MCHC RBC AUTO-ENTMCNC: 31.5 G/DL (ref 31.4–37.4)
MCV RBC AUTO: 83 FL (ref 82–98)
PLATELET # BLD AUTO: 263 THOUSANDS/UL (ref 149–390)
PMV BLD AUTO: 9 FL (ref 8.9–12.7)
POTASSIUM SERPL-SCNC: 4.6 MMOL/L (ref 3.5–5.3)
RBC # BLD AUTO: 3 MILLION/UL (ref 3.81–5.12)
SODIUM SERPL-SCNC: 139 MMOL/L (ref 136–145)
WBC # BLD AUTO: 12.31 THOUSAND/UL (ref 4.31–10.16)

## 2022-04-22 PROCEDURE — 99024 POSTOP FOLLOW-UP VISIT: CPT | Performed by: NURSE PRACTITIONER

## 2022-04-22 PROCEDURE — 80048 BASIC METABOLIC PNL TOTAL CA: CPT | Performed by: NURSE PRACTITIONER

## 2022-04-22 PROCEDURE — 85027 COMPLETE CBC AUTOMATED: CPT | Performed by: NURSE PRACTITIONER

## 2022-04-22 PROCEDURE — 82948 REAGENT STRIP/BLOOD GLUCOSE: CPT

## 2022-04-22 PROCEDURE — 87040 BLOOD CULTURE FOR BACTERIA: CPT | Performed by: NURSE PRACTITIONER

## 2022-04-22 PROCEDURE — 71045 X-RAY EXAM CHEST 1 VIEW: CPT

## 2022-04-22 RX ORDER — CIPROFLOXACIN 500 MG/1
500 TABLET, FILM COATED ORAL EVERY 12 HOURS SCHEDULED
Status: DISCONTINUED | OUTPATIENT
Start: 2022-04-22 | End: 2022-04-22

## 2022-04-22 RX ORDER — IBUPROFEN 400 MG/1
400 TABLET ORAL EVERY 6 HOURS PRN
Status: DISCONTINUED | OUTPATIENT
Start: 2022-04-22 | End: 2022-04-23 | Stop reason: HOSPADM

## 2022-04-22 RX ORDER — CEFAZOLIN SODIUM 2 G/50ML
2000 SOLUTION INTRAVENOUS EVERY 8 HOURS
Status: DISCONTINUED | OUTPATIENT
Start: 2022-04-22 | End: 2022-04-23 | Stop reason: HOSPADM

## 2022-04-22 RX ADMIN — CEFAZOLIN SODIUM 2000 MG: 2 SOLUTION INTRAVENOUS at 13:08

## 2022-04-22 RX ADMIN — ATORVASTATIN CALCIUM 20 MG: 20 TABLET, FILM COATED ORAL at 21:13

## 2022-04-22 RX ADMIN — POTASSIUM CITRATE 10 MEQ: 10 TABLET, EXTENDED RELEASE ORAL at 21:13

## 2022-04-22 RX ADMIN — ACETAMINOPHEN 650 MG: 325 TABLET, FILM COATED ORAL at 01:05

## 2022-04-22 RX ADMIN — GABAPENTIN 100 MG: 100 CAPSULE ORAL at 21:13

## 2022-04-22 RX ADMIN — MONTELUKAST 10 MG: 10 TABLET, FILM COATED ORAL at 18:39

## 2022-04-22 RX ADMIN — BUTALBITAL, ACETAMINOPHEN AND CAFFEINE 1 TABLET: 50; 325; 40 TABLET ORAL at 04:55

## 2022-04-22 RX ADMIN — INSULIN LISPRO 1 UNITS: 100 INJECTION, SOLUTION INTRAVENOUS; SUBCUTANEOUS at 14:00

## 2022-04-22 RX ADMIN — DOCUSATE SODIUM 100 MG: 100 CAPSULE, LIQUID FILLED ORAL at 08:29

## 2022-04-22 RX ADMIN — SODIUM CHLORIDE, SODIUM LACTATE, POTASSIUM CHLORIDE, AND CALCIUM CHLORIDE 125 ML/HR: .6; .31; .03; .02 INJECTION, SOLUTION INTRAVENOUS at 18:42

## 2022-04-22 RX ADMIN — SENNOSIDES 8.6 MG: 8.6 TABLET, FILM COATED ORAL at 08:30

## 2022-04-22 RX ADMIN — SODIUM CHLORIDE, SODIUM LACTATE, POTASSIUM CHLORIDE, AND CALCIUM CHLORIDE 125 ML/HR: .6; .31; .03; .02 INJECTION, SOLUTION INTRAVENOUS at 01:05

## 2022-04-22 RX ADMIN — OXYCODONE HYDROCHLORIDE 5 MG: 5 TABLET ORAL at 05:52

## 2022-04-22 RX ADMIN — BUTALBITAL, ACETAMINOPHEN AND CAFFEINE 1 TABLET: 50; 325; 40 TABLET ORAL at 09:33

## 2022-04-22 RX ADMIN — CEFAZOLIN SODIUM 2000 MG: 2 SOLUTION INTRAVENOUS at 21:13

## 2022-04-22 RX ADMIN — ONDANSETRON 4 MG: 2 INJECTION INTRAMUSCULAR; INTRAVENOUS at 09:33

## 2022-04-22 RX ADMIN — POTASSIUM CITRATE 10 MEQ: 10 TABLET, EXTENDED RELEASE ORAL at 08:31

## 2022-04-22 RX ADMIN — ACETAMINOPHEN 650 MG: 325 TABLET, FILM COATED ORAL at 05:03

## 2022-04-22 RX ADMIN — IBUPROFEN 400 MG: 400 TABLET, FILM COATED ORAL at 14:20

## 2022-04-22 RX ADMIN — FAMOTIDINE 10 MG: 20 TABLET ORAL at 08:30

## 2022-04-22 RX ADMIN — GABAPENTIN 100 MG: 100 CAPSULE ORAL at 18:39

## 2022-04-22 RX ADMIN — GABAPENTIN 100 MG: 100 CAPSULE ORAL at 08:29

## 2022-04-22 RX ADMIN — SODIUM CHLORIDE, SODIUM LACTATE, POTASSIUM CHLORIDE, AND CALCIUM CHLORIDE 125 ML/HR: .6; .31; .03; .02 INJECTION, SOLUTION INTRAVENOUS at 08:28

## 2022-04-22 RX ADMIN — FERROUS SULFATE TAB 325 MG (65 MG ELEMENTAL FE) 325 MG: 325 (65 FE) TAB at 08:29

## 2022-04-22 RX ADMIN — INSULIN LISPRO 1 UNITS: 100 INJECTION, SOLUTION INTRAVENOUS; SUBCUTANEOUS at 18:39

## 2022-04-22 RX ADMIN — INSULIN GLARGINE 10 UNITS: 100 INJECTION, SOLUTION SUBCUTANEOUS at 21:14

## 2022-04-22 RX ADMIN — FLUCONAZOLE 200 MG: 100 TABLET ORAL at 08:29

## 2022-04-22 RX ADMIN — FERROUS SULFATE TAB 325 MG (65 MG ELEMENTAL FE) 325 MG: 325 (65 FE) TAB at 18:39

## 2022-04-22 NOTE — PLAN OF CARE
Problem: MOBILITY - ADULT  Goal: Maintain or return to baseline ADL function  Description: INTERVENTIONS:  -  Assess patient's ability to carry out ADLs; assess patient's baseline for ADL function and identify physical deficits which impact ability to perform ADLs (bathing, care of mouth/teeth, toileting, grooming, dressing, etc )  - Assess/evaluate cause of self-care deficits   - Assess range of motion  - Assess patient's mobility; develop plan if impaired  - Assess patient's need for assistive devices and provide as appropriate  - Encourage maximum independence but intervene and supervise when necessary  - Involve family in performance of ADLs  - Assess for home care needs following discharge   - Consider OT consult to assist with ADL evaluation and planning for discharge  - Provide patient education as appropriate  Outcome: Progressing  Goal: Maintains/Returns to pre admission functional level  Description: INTERVENTIONS:  - Perform BMAT or MOVE assessment daily    - Set and communicate daily mobility goal to care team and patient/family/caregiver  - Collaborate with rehabilitation services on mobility goals if consulted  - Perform Range of Motion 3 times a day  - Reposition patient every 2 hours    - Dangle patient 3 times a day  - Stand patient 3 times a day  - Ambulate patient 3 times a day  - Out of bed to chair 3 times a day   - Out of bed for meals 3 times a day  - Out of bed for toileting  - Record patient progress and toleration of activity level   Outcome: Progressing     Problem: Potential for Falls  Goal: Patient will remain free of falls  Description: INTERVENTIONS:  - Educate patient/family on patient safety including physical limitations  - Instruct patient to call for assistance with activity   - Consult OT/PT to assist with strengthening/mobility   - Keep Call bell within reach  - Keep bed low and locked with side rails adjusted as appropriate  - Keep care items and personal belongings within reach  - Initiate and maintain comfort rounds  - Make Fall Risk Sign visible to staff  - Offer Toileting every 2 Hours, in advance of need  - Initiate/Maintain alarm  - Obtain necessary fall risk management equipment  - Apply yellow socks and bracelet for high fall risk patients  - Consider moving patient to room near nurses station  Outcome: Progressing     Problem: Prexisting or High Potential for Compromised Skin Integrity  Goal: Skin integrity is maintained or improved  Description: INTERVENTIONS:  - Identify patients at risk for skin breakdown  - Assess and monitor skin integrity  - Assess and monitor nutrition and hydration status  - Monitor labs   - Assess for incontinence   - Turn and reposition patient  - Assist with mobility/ambulation  - Relieve pressure over bony prominences  - Avoid friction and shearing  - Provide appropriate hygiene as needed including keeping skin clean and dry  - Evaluate need for skin moisturizer/barrier cream  - Collaborate with interdisciplinary team   - Patient/family teaching  - Consider wound care consult   Outcome: Progressing

## 2022-04-22 NOTE — PROGRESS NOTES
Progress Note - Skyler Best 60 y o  female MRN: [id number]    Unit/Bed#: S -01 Encounter: [id number]      Assessment:  Padmini Ramachandran is a 59 yo female POD #3 PCNL with AS- 2nd stage left PCNL  Original upper pole PCN was placed 3/7/22, and new lower pole PCN and utereral stent placed 4/19/22  PBRC given on 4/20 due to persistent bleeding during procedure  Failing clamp trial with increasing leukocytosis and fever  Plan:  1  Fluids  2  Abx  3  Blood culture  4  Ibuprofen  5  CXR  6  Open upper PCN to gravity  7  No dishcrage  8  Repeat labs in AM     Subjective:   HPI: Padmini Ramachandran is a 59 yo female with PMH of nephrolithiasis  She is known to the Urology service, with lithotripsy surgeries completed by Dr Silvia Arreola back in 2016  Patient was admitted on 3/7/22 for PCNL and stent insertion for staghorn calculus  L PCNU was capped upon discharge  Due to stone burden, patient underwent 2nd stage left PCNL for treatment and stone removal      Patient is seen in bed this morning, she is alert and oriented, complains of pain at the PCN site  C/o of a migraine earlier at 5am but received Fiorcet which relieved her symptoms  Objective:     Vitals: Blood pressure 130/84, pulse 99, temperature 100 4 °F (38 °C), resp  rate 18, height 5' 4" (1 626 m), weight 78 5 kg (173 lb 1 oz), SpO2 94 %  ,Body mass index is 29 71 kg/m²        Intake/Output Summary (Last 24 hours) at 4/22/2022 1412  Last data filed at 4/22/2022 1353  Gross per 24 hour   Intake 1000 ml   Output 5625 ml   Net -4625 ml       Physical Exam:   General appearance: alert and oriented, in no acute distress, appears stated age, cooperative and no distress  Head: Normocephalic, without obvious abnormality, atraumatic  Neck: no adenopathy, no carotid bruit, no JVD, supple, symmetrical, trachea midline and thyroid not enlarged, symmetric, no tenderness/mass/nodules  Lungs: clear to auscultation bilaterally  Heart: regular rate and rhythm, S1, S2 normal, no murmur, click, rub or gallop  Abdomen: soft, non-tender; bowel sounds normal; no masses,  no organomegaly  Extremities: extremities normal, warm and well-perfused; no cyanosis, clubbing, or edema  Pulses: 2+ and symmetric  Neurologic: Grossly normal  Todd-removed      Invasive Devices  Report    Peripheral Intravenous Line            Peripheral IV 04/20/22 Right Antecubital 2 days          Drain            Percutaneous Nephroureteral Tube (PCNU) Left 2 8 Fr 24 Cm 46 days    Percutaneous Nephroureteral Tube (PCNU) Left 1 4 Fr 65 Cm 3 days    Nephrostomy Left 8 Fr  2 days    Percutaneous Nephroureteral Tube (PCNU) Left 2 8 Fr 24 Cm 2 days    Percutaneous Nephroureteral Tube (PCNU) Left 1 8 5 Fr 25 Cm 1 day    Ureteral Internal Stent Left ureter 6 Fr  1 day              Lab Results   Component Value Date    WBC 12 31 (H) 04/22/2022    HGB 7 8 (L) 04/22/2022    HCT 24 8 (L) 04/22/2022    MCV 83 04/22/2022     04/22/2022     Lab Results   Component Value Date    SODIUM 139 04/22/2022    K 4 6 04/22/2022     04/22/2022    CO2 30 04/22/2022    BUN 19 04/22/2022    CREATININE 1 01 04/22/2022    GLUC 133 04/22/2022    CALCIUM 8 2 (L) 04/22/2022             Lab, Imaging and other studies: I have personally reviewed pertinent reports      VTE Pharmacologic Prophylaxis: Sequential compression device (Venodyne)   VTE Mechanical Prophylaxis: sequential compression device

## 2022-04-22 NOTE — UTILIZATION REVIEW
Initial Clinical Review  Elective outpatient procedure 4/20/22 , converted to inpatient admission 4/22/22 due to failing clamp trial with increasing leukocytosis and fever  Elective outpatient surgical procedure  Age/Sex: 60 y o  female  Surgery Date: 4/20/22 @1405  Procedure: NEPHROLITHOTOMY  PERCUTANEOUS (PCNL) (Left)  Anesthesia: general  Operative Findings: Operative Findings:  Patient had persistent bleeding from the beginning of the case associated with inflamed urothelium  Large stone burden treated and removed  New softer stent placed  New lower pole PCN placed  May possibly be helped with stent based on fluoroscopy    POD#1 Progress Note: 4/21  [facility] given on 4/20 due to persistent bleeding during procedure  Hgb 7 today from 6 7yesterday  Output from PCNs light pink color  There is leaking from PCN site, soaking the dressing in place  Patient c/o pain at the PCN site  Indwelling hurt is patent and draining clear yellow urine  Plan - Clamp top PCN and remove bag today  D/c indwelling hurt  If pt has good output, clamp lower pole but keep in place  Labs 4/22 am  1 U PRBC's ordered for transfusion today   Migraine earlier today relieved with Fioricet  IVF infusing   Date 4/22 POD #2 converted to IP  Original upper pole PCN was placed 3/7/22, and new lower pole PCN and utereral stent placed 4/19/22  Pt failed clamp trial   Has increasing WBC's of 12 31 today from 8 52 yesterday and fever of 100 4 F   Hurt has been d/c'ed today   Nonproductive cough, lungs clear   Plan - continue IVF, start IV abx-cefazolin, obtain blood cultures and CXR, open upper PCN to gravity  Keep both PCNs to straight drainage  Pain control, prn Ibuprofen  Labs 4/23        Gross per 24 hour   Intake 1000 ml   Output 5625 ml   Net -4625 ml           Admission Orders: Date/Time/Statement:   Admission Orders (From admission, onward)     Ordered        04/22/22 1412  Inpatient Admission  Once                      Orders Placed This Encounter   Procedures    Inpatient Admission     Standing Status:   Standing     Number of Occurrences:   1     Order Specific Question:   Level of Care     Answer:   Med Surg [16]     Order Specific Question:   Estimated length of stay     Answer:   More than 2 Midnights     Order Specific Question:   Certification     Answer:   I certify that inpatient services are medically necessary for this patient for a duration of greater than two midnights  See H&P and MD Progress Notes for additional information about the patient's course of treatment  Vital Signs:   Date/Time Temp Pulse Resp BP MAP (mmHg) SpO2   04/22/22 11:51:15 100 4 °F (38 °C) 99 -- 130/84 99 94 %   04/22/22 09:14:42 99 2 °F (37 3 °C) 98 18 128/79 95 93 %   04/22/22 07:00:13 99 4 °F (37 4 °C) 93 18 113/68 83 91 %   04/22/22 03:01:56 -- 85 -- 97/65 76 89 % Abnormal    04/21/22 22:08:01 99 2 °F (37 3 °C) 86 18 97/63 74 94 %   04/21/22 19:15:15 -- 88 18 111/55 -- 94 %   04/21/22 1711 99 3 °F (37 4 °C) 91 18 100/63 75 --   04/21/22 16:22:22 100 5 °F (38 1 °C) 87 18 115/74 88 96 %   04/21/22 14:59:45 99 7 °F (37 6 °C) 91 16 97/61 73 95 %         Pertinent Labs/Diagnostic Test Results:   FL retrograde pyelogram   Final Result by Lelo Jorgensen MD (04/20 1641)      Fluoroscopic guidance provided for procedure guidance  Please refer to the separate procedure notes for additional details           Workstation performed: [id number]         XR chest portable    (Results Pending)         Results from last 7 days   Lab Units 04/22/22  0449 04/21/22  0539 04/20/22  1650 04/20/22  1524   WBC Thousand/uL 12 31* 8 52 11 48*  --    HEMOGLOBIN g/dL 7 8* 7 0* 6 7*  --    I STAT HEMOGLOBIN g/dl  --   --   --  7 5*   HEMATOCRIT % 24 8* 22 6* 21 9*  --    HEMATOCRIT, ISTAT %  --   --   --  22*   PLATELETS Thousands/uL 263 293 331  --          Results from last 7 days   Lab Units 04/22/22  0449 04/21/22  0539 04/20/22  1650 04/20/22  1524   SODIUM mmol/L 139 138 140  -- POTASSIUM mmol/L 4 6 4 7 4 6  --    CHLORIDE mmol/L 105 106 107  --    CO2 mmol/L 30 25 23  --    CO2, I-STAT mmol/L  --   --   --  25   ANION GAP mmol/L 4 7 10  --    BUN mg/dL 19 18 20  --    CREATININE mg/dL 1 01 1 07 1 04  --    EGFR ml/min/1 73sq m 61 57 59  --    CALCIUM mg/dL 8 2* 8 1* 7 9*  --    CALCIUM, IONIZED, ISTAT mmol/L  --   --   --  1 15         Results from last 7 days   Lab Units 04/22/22  1112 04/22/22  0945 04/22/22  0700 04/21/22  2056 04/21/22  1827 04/21/22  1621 04/21/22  1057 04/21/22  0708 04/20/22  1655 04/19/22  1247   POC GLUCOSE mg/dl 169* 142* 136 176* 256* 221* 177* 200* 170* 98     Results from last 7 days   Lab Units 04/22/22  0449 04/21/22  0539 04/20/22  1650   GLUCOSE RANDOM mg/dL 133 222* 169*             No results found for: BETA-HYDROXYBUTYRATE           Results from last 7 days   Lab Units 04/20/22  1524   PH, VIOLETA I-STAT  7 385   PCO2, VIOLETA ISTAT mm HG 40 3*   PO2, VIOLETA ISTAT mm  0*   HCO3, VIOLETA ISTAT mmol/L 24 1   I STAT BASE EXC mmol/L -1   I STAT O2 SAT % 99*                 Results from last 7 days   Lab Units 04/16/22  1013   URINE CULTURE  >100,000 cfu/ml - Clavispora lusitaniae Yeast species*               Diet: level 3 carb  Mobility: Up as juan carlos  DVT Prophylaxis: SCD's , ambulation, independent per nsg      Scheduled Medications:  acetaminophen, 650 mg, Oral, Q6H PEDRO  atorvastatin, 20 mg, Oral, HS  cefazolin, 2,000 mg, Intravenous, [id number]: 04/22/22 1300  docusate sodium, 100 mg, Oral, BID  famotidine, 10 mg, Oral, Daily  ferrous sulfate, 325 mg, Oral, BID With Meals  fluconazole, 200 mg, Oral, Daily  gabapentin, 100 mg, Oral, TID  insulin glargine, 10 Units, Subcutaneous, HS  insulin lispro, 1-6 Units, Subcutaneous, 4 times day  montelukast, 10 mg, Oral, QPM  potassium citrate, 10 mEq, Oral, BID  senna, 1 tablet, Oral, Daily      Continuous IV Infusions:  lactated ringers, 125 mL/hr, Intravenous, Continuous      PRN Meds:  albuterol, 1 puff, Inhalation, Q4H PRN  butalbital-acetaminophen-caffeine, 1 tablet, Oral, Q4H PRN x2 4/21, x2 4/22  HYDROmorphone, 0 5 mg, Intravenous, Q2H PRN  ibuprofen, 400 mg, Oral, Q6H PRN x1 4/22  ondansetron, 4 mg, Intravenous, Q6H PRN x1 4/21, x1 4/22  oxybutynin, 5 mg, Oral, TID PRN  oxyCODONE, 10 mg, Oral, Q4H PRN x1 4/21  oxyCODONE, 5 mg, Oral, Q4H PRN x2 4/21, x1 4/22        Network Utilization Review Department  ATTENTION: Please call with any questions or concerns to [phone] and carefully listen to the prompts so that you are directed to the right person  All voicemails are confidential   Juli Zendejas all requests for admission clinical reviews, approved or denied determinations and any other requests to dedicated fax number below belonging to the campus where the patient is receiving treatment   List of dedicated fax numbers for the Facilities:  [address] DENIALS (Administrative/Medical Necessity) [phone]   [address] (Maternity/NICU/Pediatrics) [phone]   [address]  [address] [place] [address] [place] [address] Isabela [address] [facility] [place] [address] [phone]

## 2022-04-22 NOTE — PLAN OF CARE
Problem: MOBILITY - ADULT  Goal: Maintain or return to baseline ADL function  Description: INTERVENTIONS:  -  Assess patient's ability to carry out ADLs; assess patient's baseline for ADL function and identify physical deficits which impact ability to perform ADLs (bathing, care of mouth/teeth, toileting, grooming, dressing, etc )  - Assess/evaluate cause of self-care deficits   - Assess range of motion  - Assess patient's mobility; develop plan if impaired  - Assess patient's need for assistive devices and provide as appropriate  - Encourage maximum independence but intervene and supervise when necessary  - Involve family in performance of ADLs  - Assess for home care needs following discharge   - Consider OT consult to assist with ADL evaluation and planning for discharge  - Provide patient education as appropriate  4/22/2022 1427 by Avelina Gabriel RN  Outcome: Progressing  4/22/2022 1340 by Avelina Gabriel RN  Outcome: Progressing  Goal: Maintains/Returns to pre admission functional level  Description: INTERVENTIONS:  - Perform BMAT or MOVE assessment daily    - Set and communicate daily mobility goal to care team and patient/family/caregiver  - Collaborate with rehabilitation services on mobility goals if consulted  - Perform Range of Motion 3 times a day  - Reposition patient every 2 hours    - Dangle patient 3 times a day  - Stand patient 3 times a day  - Ambulate patient 3 times a day  - Out of bed to chair 3 times a day   - Out of bed for meals 3 times a day  - Out of bed for toileting  - Record patient progress and toleration of activity level   4/22/2022 1427 by Avelina Gabriel RN  Outcome: Progressing  4/22/2022 1340 by Avelina Gabriel RN  Outcome: Progressing     Problem: Potential for Falls  Goal: Patient will remain free of falls  Description: INTERVENTIONS:  - Educate patient/family on patient safety including physical limitations  - Instruct patient to call for assistance with activity   - Consult OT/PT to assist with strengthening/mobility   - Keep Call bell within reach  - Keep bed low and locked with side rails adjusted as appropriate  - Keep care items and personal belongings within reach  - Initiate and maintain comfort rounds  - Make Fall Risk Sign visible to staff  - Offer Toileting every 2 Hours, in advance of need  - Initiate/Maintain alarm  - Obtain necessary fall risk management equipment  - Apply yellow socks and bracelet for high fall risk patients  - Consider moving patient to room near nurses station  4/22/2022 1427 by Jeremiah Wen RN  Outcome: Progressing  4/22/2022 1340 by Jeremiah Wen RN  Outcome: Progressing     Problem: Prexisting or High Potential for Compromised Skin Integrity  Goal: Skin integrity is maintained or improved  Description: INTERVENTIONS:  - Identify patients at risk for skin breakdown  - Assess and monitor skin integrity  - Assess and monitor nutrition and hydration status  - Monitor labs   - Assess for incontinence   - Turn and reposition patient  - Assist with mobility/ambulation  - Relieve pressure over bony prominences  - Avoid friction and shearing  - Provide appropriate hygiene as needed including keeping skin clean and dry  - Evaluate need for skin moisturizer/barrier cream  - Collaborate with interdisciplinary team   - Patient/family teaching  - Consider wound care consult   4/22/2022 1427 by Jeremiah Wen RN  Outcome: Progressing  4/22/2022 1340 by Jeremiah Wen RN  Outcome: Progressing

## 2022-04-23 ENCOUNTER — TELEPHONE (OUTPATIENT)
Dept: OTHER | Facility: HOSPITAL | Age: 57
End: 2022-04-23

## 2022-04-23 VITALS
TEMPERATURE: 99.8 F | DIASTOLIC BLOOD PRESSURE: 75 MMHG | SYSTOLIC BLOOD PRESSURE: 124 MMHG | HEART RATE: 89 BPM | RESPIRATION RATE: 16 BRPM | HEIGHT: 64 IN | WEIGHT: 173.06 LBS | BODY MASS INDEX: 29.55 KG/M2 | OXYGEN SATURATION: 95 %

## 2022-04-23 LAB
ABO GROUP BLD BPU: NORMAL
ABO GROUP BLD BPU: NORMAL
ANION GAP SERPL CALCULATED.3IONS-SCNC: 9 MMOL/L (ref 4–13)
BPU ID: NORMAL
BPU ID: NORMAL
BUN SERPL-MCNC: 12 MG/DL (ref 5–25)
CALCIUM SERPL-MCNC: 8.8 MG/DL (ref 8.3–10.1)
CHLORIDE SERPL-SCNC: 102 MMOL/L (ref 100–108)
CO2 SERPL-SCNC: 28 MMOL/L (ref 21–32)
CREAT SERPL-MCNC: 1 MG/DL (ref 0.6–1.3)
CROSSMATCH: NORMAL
CROSSMATCH: NORMAL
ERYTHROCYTE [DISTWIDTH] IN BLOOD BY AUTOMATED COUNT: 15 % (ref 11.6–15.1)
GFR SERPL CREATININE-BSD FRML MDRD: 62 ML/MIN/1.73SQ M
GLUCOSE SERPL-MCNC: 161 MG/DL (ref 65–140)
GLUCOSE SERPL-MCNC: 197 MG/DL (ref 65–140)
GLUCOSE SERPL-MCNC: 216 MG/DL (ref 65–140)
GLUCOSE SERPL-MCNC: 225 MG/DL (ref 65–140)
HCT VFR BLD AUTO: 27 % (ref 34.8–46.1)
HGB BLD-MCNC: 8.3 G/DL (ref 11.5–15.4)
MCH RBC QN AUTO: 25.4 PG (ref 26.8–34.3)
MCHC RBC AUTO-ENTMCNC: 30.7 G/DL (ref 31.4–37.4)
MCV RBC AUTO: 83 FL (ref 82–98)
PLATELET # BLD AUTO: 276 THOUSANDS/UL (ref 149–390)
PMV BLD AUTO: 9.1 FL (ref 8.9–12.7)
POTASSIUM SERPL-SCNC: 4.2 MMOL/L (ref 3.5–5.3)
RBC # BLD AUTO: 3.27 MILLION/UL (ref 3.81–5.12)
SODIUM SERPL-SCNC: 139 MMOL/L (ref 136–145)
UNIT DISPENSE STATUS: NORMAL
UNIT DISPENSE STATUS: NORMAL
UNIT PRODUCT CODE: NORMAL
UNIT PRODUCT CODE: NORMAL
UNIT PRODUCT VOLUME: 350 ML
UNIT PRODUCT VOLUME: 350 ML
UNIT RH: NORMAL
UNIT RH: NORMAL
WBC # BLD AUTO: 11.39 THOUSAND/UL (ref 4.31–10.16)

## 2022-04-23 PROCEDURE — NC001 PR NO CHARGE: Performed by: PHYSICIAN ASSISTANT

## 2022-04-23 PROCEDURE — 82948 REAGENT STRIP/BLOOD GLUCOSE: CPT

## 2022-04-23 PROCEDURE — 80048 BASIC METABOLIC PNL TOTAL CA: CPT | Performed by: NURSE PRACTITIONER

## 2022-04-23 PROCEDURE — 85027 COMPLETE CBC AUTOMATED: CPT | Performed by: NURSE PRACTITIONER

## 2022-04-23 PROCEDURE — 99024 POSTOP FOLLOW-UP VISIT: CPT | Performed by: PHYSICIAN ASSISTANT

## 2022-04-23 RX ADMIN — FAMOTIDINE 10 MG: 20 TABLET ORAL at 09:21

## 2022-04-23 RX ADMIN — FERROUS SULFATE TAB 325 MG (65 MG ELEMENTAL FE) 325 MG: 325 (65 FE) TAB at 09:20

## 2022-04-23 RX ADMIN — INSULIN LISPRO 1 UNITS: 100 INJECTION, SOLUTION INTRAVENOUS; SUBCUTANEOUS at 09:21

## 2022-04-23 RX ADMIN — FLUCONAZOLE 200 MG: 100 TABLET ORAL at 09:20

## 2022-04-23 RX ADMIN — POTASSIUM CITRATE 10 MEQ: 10 TABLET, EXTENDED RELEASE ORAL at 09:21

## 2022-04-23 RX ADMIN — CEFAZOLIN SODIUM 2000 MG: 2 SOLUTION INTRAVENOUS at 05:38

## 2022-04-23 RX ADMIN — GABAPENTIN 100 MG: 100 CAPSULE ORAL at 09:20

## 2022-04-23 NOTE — TELEPHONE ENCOUNTER
Patient status post 2 PCN and stent placement  Failed clamp trial in patient  Will need follow up out patient for clamp trial and possibly repeat imaging  Appointment already scheduled for 5/5 with Dr Nat Epperson  Please call patient to ensure she is doing well  She is anxious about going home with PCN  Thanks!

## 2022-04-23 NOTE — UTILIZATION REVIEW
INPATIENT DAY 2   4/23    Date: 4/23     POD#: 3  Current Patient Class: Inpatient  Current Level of Care: med surg     Assessment/Plan: 60 y o  female, initial surgery date 4/20  Failed clamping trial of PCN 4/22  Developed leukocytosis and fever  Pt did well overnight, VSS  2 PCN draining clear yellow Pt also reports she voided one time today  Percutaneous Nephrostomy Drains will both be  maintained to gravity drainage and will be reevaluated as outpatient     Pertinent Labs/Diagnostic Results:       Results from last 7 days   Lab Units 04/23/22  0948 04/22/22  0449 04/21/22  0539 04/20/22  1650 04/20/22  1650 04/20/22  1524   WBC Thousand/uL 11 39* 12 31* 8 52   < > 11 48*  --    HEMOGLOBIN g/dL 8 3* 7 8* 7 0*  --  6 7*  --    I STAT HEMOGLOBIN g/dl  --   --   --   --   --  7 5*   HEMATOCRIT % 27 0* 24 8* 22 6*  --  21 9*  --    HEMATOCRIT, ISTAT %  --   --   --   --   --  22*   PLATELETS Thousands/uL 276 263 293   < > 331  --     < > = values in this interval not displayed           Results from last 7 days   Lab Units 04/23/22  0942 04/22/22  0449 04/21/22  0539 04/20/22  1650 04/20/22  1524   SODIUM mmol/L 139 139 138 140  --    POTASSIUM mmol/L 4 2 4 6 4 7 4 6  --    CHLORIDE mmol/L 102 105 106 107  --    CO2 mmol/L 28 30 25 23  --    CO2, I-STAT mmol/L  --   --   --   --  25   ANION GAP mmol/L 9 4 7 10  --    BUN mg/dL 12 19 18 20  --    CREATININE mg/dL 1 00 1 01 1 07 1 04  --    EGFR ml/min/1 73sq m 62 61 57 59  --    CALCIUM mg/dL 8 8 8 2* 8 1* 7 9*  --    CALCIUM, IONIZED, ISTAT mmol/L  --   --   --   --  1 15         Results from last 7 days   Lab Units 04/23/22  1101 04/23/22  0943 04/23/22  0719 04/22/22  2033 04/22/22  1621 04/22/22  1112 04/22/22  0945 04/22/22  0700 04/21/22  2056 04/21/22  1827 04/21/22  1621 04/21/22  1057   POC GLUCOSE mg/dl 197* 225* 161* 205* 150* 169* 142* 136 176* 256* 221* 177*     Results from last 7 days   Lab Units 04/23/22  0942 04/22/22  0449 04/21/22  0539 04/20/22  1650   GLUCOSE RANDOM mg/dL 216* 133 222* 169*             No results found for: BETA-HYDROXYBUTYRATE           Results from last 7 days   Lab Units 04/20/22  1524   PH, VIOLETA I-STAT  7 385   PCO2, VIOLETA ISTAT mm HG 40 3*   PO2, VIOLETA ISTAT mm  0*   HCO3, VIOLETA ISTAT mmol/L 24 1   I STAT BASE EXC mmol/L -1   I STAT O2 SAT % 99*         Results from last 7 days   Lab Units 04/22/22  1436   BLOOD CULTURE  Received in Microbiology Lab  Culture in Progress  Vital Signs:     Date/Time Temp Pulse Resp BP MAP (mmHg) SpO2 O2 Flow Rate (L/min) O2 Device Patient Position - Orthostatic VS   04/23/22 07:20:33 99 8 °F (37 7 °C) 89 16 124/75 91 95 % -- -- --   04/23/22 07:17:27 99 8 °F (37 7 °C) 91 16 124/75 91 92 % -- -- --   04/23/22 0257 99 3 °F (37 4 °C) 89 18 121/67 -- 96 % -- None (Room air) Lying   04/22/22 2243 99 °F (37 2 °C) 92 18 124/69 82 96 % -- None (Room air) Lying         Medications:   Scheduled Medications:   Cefazolin 2 gm /50 ml every 8 h last dose 4/23 0538   Pepcid 10 mg PO daily 4/23 0921   ferrous sulfate 325 mg PO BID 4/23 0920  Diflucan 200 mg daily PO 4/23 0900  Gabapentin 100 mg PO TID 4/23 0920   Lispro I unit SQ 4/23 0921    Potassium citrate 10 MEQ BID PO 4/23 0921     Continuous IV Infusions:   ml/h DC'd 4/23 1432       PRN Meds: None needed  4/23         Discharge Plan:  Discharge 4/23     Network Utilization Review Department  ATTENTION: Please call with any questions or concerns to [phone] and carefully listen to the prompts so that you are directed to the right person  All voicemails are confidential   Joy Robison all requests for admission clinical reviews, approved or denied determinations and any other requests to dedicated fax number below belonging to the campus where the patient is receiving treatment   List of dedicated fax numbers for the Facilities:  FACILITY NAME UR FAX NUMBER   ADMISSION DENIALS (Administrative/Medical Necessity) [phone]   [facility] (Maternity/NICU/Pediatrics) [address] [facility],[address] Floor [facility] 40 125 [facility]  [phone]   Thomas Dukes [address] [place] [address] [address] [place] [address] Isabela Nathan [facility] [address] [facility] [address] [phone]

## 2022-04-23 NOTE — PROGRESS NOTES
Progress Note - Urology  Catrina Araujo 1965, 60 y o  female MRN: [id number]    Unit/Bed#: S -66 Encounter: [id number]    Assessment and Plan:  Tiffanie Thomas is a 61 yo female POD #3 PCNL with AS- 2nd stage left PCNL  Original upper pole PCN was placed 3/7/22, and new lower pole PCN and utereral stent placed 4/19/22  PBRC given on 4/20 due to persistent bleeding during procedure       Failing clamp trial with increasing leukocytosis and fever     - Continue fluids and antibiotics  - Continue ibuprofen  - Maintain PCNs  - Hemoglobin 8 3, hematocrit 27  - Monitor labs  - Would recommend continued monitoring   - Potential repeat clamp trial in the next few days  Appreciate MD input on timeframe    Subjective:   HPI: Did well overnight, VSS  Sitting comfortably in bed  Trial of PCN clamping which resulted in pain and fever, now open to drainage  2 PCN draining well, clear yellow  Patient states she did void 1 time today  Review of Systems   Constitutional: Negative for activity change, appetite change, chills and fever  HENT: Negative for congestion and trouble swallowing  Respiratory: Negative for cough and shortness of breath  Cardiovascular: Negative for chest pain, palpitations and leg swelling  Gastrointestinal: Negative for abdominal pain, constipation, diarrhea, nausea and vomiting  Genitourinary: Positive for flank pain  Negative for hematuria  Musculoskeletal: Negative for back pain and gait problem  Skin: Negative for wound  Allergic/Immunologic: Negative for immunocompromised state  Neurological: Negative for dizziness and syncope  Hematological: Does not bruise/bleed easily  Psychiatric/Behavioral: Negative for confusion  All other systems reviewed and are negative  Objective:  Nursing Rounds:   Vitals: Blood pressure 124/75, pulse 89, temperature 99 8 °F (37 7 °C), resp  rate 16, height 5' 4" (1 626 m), weight 78 5 kg (173 lb 1 oz), SpO2 95 %  ,Body mass index is 29 71 kg/m²  Physical Exam  Constitutional:       General: She is not in acute distress  Appearance: Normal appearance  She is not ill-appearing, toxic-appearing or diaphoretic  HENT:      Head: Normocephalic  Nose: No congestion  Eyes:      General: No scleral icterus  Right eye: No discharge  Left eye: No discharge  Conjunctiva/sclera: Conjunctivae normal       Pupils: Pupils are equal, round, and reactive to light  Pulmonary:      Effort: Pulmonary effort is normal    Musculoskeletal:      Cervical back: Normal range of motion  Skin:     General: Skin is warm and dry  Coloration: Skin is not jaundiced or pale  Findings: No bruising, erythema, lesion or rash  Neurological:      General: No focal deficit present  Mental Status: She is alert and oriented to person, place, and time  Mental status is at baseline  Gait: Gait normal    Psychiatric:         Mood and Affect: Mood normal          Behavior: Behavior normal          Thought Content: Thought content normal          Judgment: Judgment normal          Imaging:    Imaging reviewed - both report and images personally reviewed       Labs:  Recent Labs     04/20/22  1650 04/21/22  0539 04/22/22  0449   WBC 11 48* 8 52 12 31*     Recent Labs     04/20/22  1524 04/20/22  1650 04/21/22  0539 04/22/22  0449   HGB 7 5* 6 7* 7 0* 7 8*     Recent Labs     04/20/22  1524 04/20/22  1650 04/21/22  0539 04/22/22  0449   HCT 22* 21 9* 22 6* 24 8*     Recent Labs     04/20/22  1650 04/21/22  0539 04/22/22  0449   CREATININE 1 04 1 07 1 01       History:  Past Medical History:   Diagnosis Date    Abnormal LFTs (liver function tests)     last assessed 09/11/14    Anemia     last assessed 07/28/14    Anesthesia complication     Restrictive airway diesease- has asthma attack when extubated    Asthma     Colon polyp     Diabetes mellitus ([facility] )     Diabetes mellitus due to underlying condition with hyperglycemia, without long-term current use of insulin ([facility] ) 2020    Elevated blood pressure reading     last assessed 13    GERD (gastroesophageal reflux disease)     Hematuria     last assessed 14    Hypercalcemia     last assessed 14    Hyperlipidemia     Kidney stone     Leukocytosis     last assessed     Microalbuminuria     last assessed 14    Reactive airway disease     Restrictive lung disease     [facility] 2013    Type 2 diabetes mellitus without complication, without long-term current use of insulin ([facility] ) 2020     Social History     Socioeconomic History    Marital status: /Civil Union     Spouse name: Not on file    Number of children: Not on file    Years of education: Not on file    Highest education level: Not on file   Occupational History    Not on file   Tobacco Use    Smoking status: Never Smoker    Smokeless tobacco: Never Used   Vaping Use    Vaping Use: Never used   Substance and Sexual Activity    Alcohol use: Yes     Comment: social    Drug use: Never    Sexual activity: Not on file   Other Topics Concern    Not on file   Social History Narrative    Exercising regularly     Social Determinants of Health     Financial Resource Strain: Not on file   Food Insecurity: Not on file   Transportation Needs: Not on file   Physical Activity: Not on file   Stress: Not on file   Social Connections: Not on file   Intimate Partner Violence: Not on file   Housing Stability: Not on file     Past Surgical History:   Procedure Laterality Date     SECTION      [facility] RETROGRADE PYELOGRAM  3/7/2022    FL RETROGRADE PYELOGRAM  2022    HYSTERECTOMY      age 37 per MRS    IR NEPHROSTOMY TUBE PLACEMENT  2022    IR NEPHROURETERAL ACCESS FOR UROLOGY PCNL  3/1/2022    OOPHORECTOMY Bilateral     age 37 per MRS, with hysterectomy    ND PERCUT REMV KID STONE,2+ CM Left 3/7/2022 Procedure: NEPHROLITHOTOMY  PERCUTANEOUS (PCNL);   Surgeon: Mehul Tovar MD;  Location: [facility];  Service: Urology    TOTAL ABDOMINAL HYSTERECTOMY W/ BILATERAL SALPINGOOPHORECTOMY       Family History   Problem Relation Age of Onset    Diabetes Father         borderline    Melanoma Father     No Known Problems Mother     No Known Problems Sister     No Known Problems Daughter     No Known Problems Maternal Grandmother     No Known Problems Maternal Grandfather     No Known Problems Paternal Grandmother     No Known Problems Paternal Grandfather     No Known Problems Maternal Aunt     No Known Problems Paternal Aunt        [place], [place]  Date: 4/23/2022 Time: 9:45 AM

## 2022-04-23 NOTE — DISCHARGE SUMMARY
Discharge Summary - Cornelio Purvis 60 y o  female MRN: [id number]    Unit/Bed#: S -12 Encounter: [id number]    Admission Date:   Admission Orders (From admission, onward)     Ordered        04/22/22 1412  Inpatient Admission  Once                        Admitting Diagnosis: Staghorn renal calculus [N20 0]    HPI: Patient s/p PCNL with Dr Gaviota Wills as well as 2nd stage left PCNL  Patient had PCN placed 3/7/22 and a second lower pole PCN and ureteral stent placed 4/19/22  Patient received PBRC on 4/20 due to persistent bleeding during procedure  Patient had clamping trial of one PCN, however, developed leukocytosis and fever  Both PCN open to drain and will be re-evaluated outpatient  Procedures Performed: No orders of the defined types were placed in this encounter  Summary of Hospital Course: as above    Significant Findings, Care, Treatment and Services Provided: as above    Complications: as above    Discharge Diagnosis: same    Medical Problems             Resolved Problems  Date Reviewed: 4/19/2022    None                Condition at Discharge: good         Discharge instructions/Information to patient and family:   See after visit summary for information provided to patient and family  Provisions for Follow-Up Care:  See after visit summary for information related to follow-up care and any pertinent home health orders  PCP: Gt Gallardo MD    Disposition: Home    Planned Readmission: No      Discharge Statement   I spent 25 minutes discharging the patient  This time was spent on the day of discharge  I had direct contact with the patient on the day of discharge  Additional documentation is required if more than 30 minutes were spent on discharge  Discharge Medications:  See after visit summary for reconciled discharge medications provided to patient and family

## 2022-04-25 ENCOUNTER — TRANSITIONAL CARE MANAGEMENT (OUTPATIENT)
Dept: INTERNAL MEDICINE CLINIC | Facility: CLINIC | Age: 57
End: 2022-04-25

## 2022-04-25 DIAGNOSIS — E11.65 UNCONTROLLED TYPE 2 DIABETES MELLITUS WITH HYPERGLYCEMIA (HCC): ICD-10-CM

## 2022-04-25 PROCEDURE — TCMPR

## 2022-04-25 RX ORDER — DULAGLUTIDE 0.75 MG/.5ML
0.75 INJECTION, SOLUTION SUBCUTANEOUS
Qty: 2 ML | Refills: 0 | Status: SHIPPED | OUTPATIENT
Start: 2022-04-25 | End: 2022-06-09

## 2022-04-25 NOTE — ANESTHESIA POSTPROCEDURE EVALUATION
Post-Op Assessment Note             Reason for prolonged intubation > 24 hours:  N/aReason for prolonged intubation > 48 hours:  N/a      No complications documented  BP      Temp     Pulse     Resp      SpO2        Patient extubated after procedure

## 2022-04-25 NOTE — UTILIZATION REVIEW
Notification of Discharge   This is a Notification of Discharge from our facility [address]  Please be advised that this patient has been discharge from our facility  Below you will find the admission and discharge date and time including the patients disposition  UTILIZATION REVIEW CONTACT:  Niharika Miller MA  Utilization   Network Utilization Review Department  Phone: [phone] x carefully listen to the prompts  All voicemails are confidential   Email: [email or web address]     PHYSICIAN ADVISORY SERVICES:  FOR EJHU-PJ-ZYES REVIEW - MEDICAL NECESSITY DENIAL  Phone: [phone]  Fax: [phone]  Email: [email or web address]     PRESENTATION DATE: 4/20/2022 11:32 AM  OBERVATION ADMISSION DATE:   INPATIENT ADMISSION DATE: 4/22/22  2:12 PM   DISCHARGE DATE: 4/23/2022 12:32 PM  DISPOSITION: Home/Self Care Home/Self Care      IMPORTANT INFORMATION:  Send all requests for admission clinical reviews, approved or denied determinations and any other requests to dedicated fax number below belonging to the campus where the patient is receiving treatment   List of dedicated fax numbers:  [address] DENIALS (Administrative/Medical Necessity) [phone]   [address] (Maternity/NICU/Pediatrics) [phone]   [facility] [phone]   130 [facility] [phone]   [address] [place] [address] [phone]   [address] [phone]   [facility]  [phone]   2205 [facility], [facility]  2401 [facility] 1000 [address] [facility] [phone]

## 2022-04-25 NOTE — ANESTHESIA POSTPROCEDURE EVALUATION
Post-Op Assessment Note    No complications documented  BP      Temp     Pulse     Resp      SpO2        Chart error, patient does not have active airway placed by anesthesia

## 2022-04-25 NOTE — UTILIZATION REVIEW
Inpatient Admission Authorization Request   NOTIFICATION OF INPATIENT ADMISSION/INPATIENT AUTHORIZATION REQUEST   SERVICING FACILITY:   [facility] [address]  Tax ID: [id number]  NPI: [id number]  Place of Service: Inpatient [address] [facility]  [address]  Place of Service Code: 24     ATTENDING PROVIDER:  Attending Name and NPI#: Ayaan Perkins Md [[id number]]  Address: Sharon Fisher  [address]  Phone: [phone]     UTILIZATION REVIEW CONTACT:  Crista Rodgers, Utilization   Network Utilization Review Department  Phone: [phone]  Fax: [phone]  Email: [email or web address]     PHYSICIAN ADVISORY SERVICES:  FOR CARN-OB-HYQS REVIEW - MEDICAL NECESSITY DENIAL  Phone: [phone]  Fax: [phone]  Email: [email or web address]     TYPE OF REQUEST:  Inpatient Status     ADMISSION INFORMATION:  ADMISSION DATE/TIME: 4/22/22  2:12 PM  PATIENT DIAGNOSIS CODE/DESCRIPTION:  Staghorn renal calculus [N20 0]  DISCHARGE DATE/TIME: 4/23/2022 12:32 PM   IMPORTANT INFORMATION:  Please contact the Crista Rodgers directly with any questions or concerns regarding this request  Department voicemails are confidential     Send requests for admission clinical reviews, concurrent reviews, approvals, and administrative denials due to lack of clinical to fax [phone]  Initial Clinical Review  Elective outpatient procedure 4/20/22 , converted to inpatient admission 4/22/22 due to failing clamp trial with increasing leukocytosis and fever      Elective outpatient surgical procedure  Age/Sex: 60 y o  female  Surgery Date: 4/20/22 @1405  Procedure: NEPHROLITHOTOMY  PERCUTANEOUS (PCNL) (Left)  Anesthesia: general  Operative Findings: Operative Findings:  Patient had persistent bleeding from the beginning of the case associated with inflamed urothelium  Large stone burden treated and removed  New softer stent placed  New lower pole PCN placed  May possibly be helped with stent based on fluoroscopy    POD#1 Progress Note: 4/21  [facility] given on 4/20 due to persistent bleeding during procedure  Hgb 7 today from 6 7yesterday  Output from PCNs light pink color  There is leaking from PCN site, soaking the dressing in place  Patient c/o pain at the PCN site  Indwelling hurt is patent and draining clear yellow urine  Plan - Clamp top PCN and remove bag today  D/c indwelling hurt  If pt has good output, clamp lower pole but keep in place  Labs 4/22 am  1 U PRBC's ordered for transfusion today   Migraine earlier today relieved with Fioricet  IVF infusing   Date 4/22 POD #2 converted to IP  Original upper pole PCN was placed 3/7/22, and new lower pole PCN and utereral stent placed 4/19/22  Pt failed clamp trial   Has increasing WBC's of 12 31 today from 8 52 yesterday and fever of 100 4 F   Hurt has been d/c'ed today   Nonproductive cough, lungs clear   Plan - continue IVF, start IV abx-cefazolin, obtain blood cultures and CXR, open upper PCN to gravity  Keep both PCNs to straight drainage  Pain control, prn Ibuprofen  Labs 4/23   Gross per 24 hour   Intake 1000 ml   Output 5625 ml   Net -4625 ml           Admission Orders: Date/Time/Statement:   Admission Orders (From admission, onward)     Ordered        04/22/22 1412  Inpatient Admission  Once                      Orders Placed This Encounter   Procedures    Inpatient Admission     Standing Status:   Standing     Number of Occurrences:   1     Order Specific Question:   Level of Care     Answer:   Med Surg [16]     Order Specific Question:   Estimated length of stay     Answer:   More than 2 Midnights     Order Specific Question:   Certification     Answer:   I certify that inpatient services are medically necessary for this patient for a duration of greater than two midnights  See H&P and MD Progress Notes for additional information about the patient's course of treatment       Vital Signs: Date/Time Temp Pulse Resp BP MAP (mmHg) SpO2   04/22/22 11:51:15 100 4 °F (38 °C) 99 -- 130/84 99 94 %   04/22/22 09:14:42 99 2 °F (37 3 °C) 98 18 128/79 95 93 %   04/22/22 07:00:13 99 4 °F (37 4 °C) 93 18 113/68 83 91 %   04/22/22 03:01:56 -- 85 -- 97/65 76 89 % Abnormal    04/21/22 22:08:01 99 2 °F (37 3 °C) 86 18 97/63 74 94 %   04/21/22 19:15:15 -- 88 18 111/55 -- 94 %   04/21/22 1711 99 3 °F (37 4 °C) 91 18 100/63 75 --   04/21/22 16:22:22 100 5 °F (38 1 °C) 87 18 115/74 88 96 %   04/21/22 14:59:45 99 7 °F (37 6 °C) 91 16 97/61 73 95 %         Pertinent Labs/Diagnostic Test Results:   XR chest portable   Final Result by Jose Preston MD (04/23 1699)      Bibasilar atelectasis  Workstation performed: [id number]         FL retrograde pyelogram   Final Result by Elina Wilson MD (04/20 1641)      Fluoroscopic guidance provided for procedure guidance  Please refer to the separate procedure notes for additional details  Workstation performed: [id number]               Results from last 7 days   Lab Units 04/23/22  0948 04/22/22  0449 04/21/22  0539 04/20/22  1650 04/20/22  1650 04/20/22  1524   WBC Thousand/uL 11 39* 12 31* 8 52   < > 11 48*  --    HEMOGLOBIN g/dL 8 3* 7 8* 7 0*  --  6 7*  --    I STAT HEMOGLOBIN g/dl  --   --   --   --   --  7 5*   HEMATOCRIT % 27 0* 24 8* 22 6*  --  21 9*  --    HEMATOCRIT, ISTAT %  --   --   --   --   --  22*   PLATELETS Thousands/uL 276 263 293   < > 331  --     < > = values in this interval not displayed           Results from last 7 days   Lab Units 04/23/22  0942 04/22/22  0449 04/21/22  0539 04/20/22  1650 04/20/22  1524   SODIUM mmol/L 139 139 138 140  --    POTASSIUM mmol/L 4 2 4 6 4 7 4 6  --    CHLORIDE mmol/L 102 105 106 107  --    CO2 mmol/L 28 30 25 23  --    CO2, I-STAT mmol/L  --   --   --   --  25   ANION GAP mmol/L 9 4 7 10  --    BUN mg/dL 12 19 18 20  --    CREATININE mg/dL 1 00 1 01 1 07 1 04  --    EGFR ml/min/1 73sq m 62 61 57 59 --    CALCIUM mg/dL 8 8 8 2* 8 1* 7 9*  --    CALCIUM, IONIZED, ISTAT mmol/L  --   --   --   --  1 15         Results from last 7 days   Lab Units 04/23/22  1101 04/23/22  0943 04/23/22  0719 04/22/22  2033 04/22/22  1621 04/22/22  1112 04/22/22  0945 04/22/22  0700 04/21/22  2056 04/21/22  1827 04/21/22  1621 04/21/22  1057   POC GLUCOSE mg/dl 197* 225* 161* 205* 150* 169* 142* 136 176* 256* 221* 177*     Results from last 7 days   Lab Units 04/23/22  0942 04/22/22  0449 04/21/22  0539 04/20/22  1650   GLUCOSE RANDOM mg/dL 216* 133 222* 169*             No results found for: BETA-HYDROXYBUTYRATE           Results from last 7 days   Lab Units 04/20/22  1524   PH, VIOLETA I-STAT  7 385   PCO2, VIOLETA ISTAT mm HG 40 3*   PO2, VIOLETA ISTAT mm  0*   HCO3, VIOLETA ISTAT mmol/L 24 1   I STAT BASE EXC mmol/L -1   I STAT O2 SAT % 99*                 Results from last 7 days   Lab Units 04/22/22  1436   BLOOD CULTURE  No Growth at 48 hrs  Diet: level 3 carb  Mobility: Up as juan carlos  DVT Prophylaxis: SCD's , ambulation, independent per nsg      Scheduled Medications:  acetaminophen, 650 mg, Oral, Q6H PEDRO  atorvastatin, 20 mg, Oral, HS  cefazolin, 2,000 mg, Intravenous, [id number]: 04/22/22 1300  docusate sodium, 100 mg, Oral, BID  famotidine, 10 mg, Oral, Daily  ferrous sulfate, 325 mg, Oral, BID With Meals  fluconazole, 200 mg, Oral, Daily  gabapentin, 100 mg, Oral, TID  insulin glargine, 10 Units, Subcutaneous, HS  insulin lispro, 1-6 Units, Subcutaneous, 4 times day  montelukast, 10 mg, Oral, QPM  potassium citrate, 10 mEq, Oral, BID  senna, 1 tablet, Oral, Daily      Continuous IV Infusions:  No current facility-administered medications for this encounter      PRN Meds:  albuterol, 1 puff, Inhalation, Q4H PRN  butalbital-acetaminophen-caffeine, 1 tablet, Oral, Q4H PRN x2 4/21, x2 4/22  HYDROmorphone, 0 5 mg, Intravenous, Q2H PRN  ibuprofen, 400 mg, Oral, Q6H PRN x1 4/22  ondansetron, 4 mg, Intravenous, Q6H PRN x1 4/21, x1 4/22  oxybutynin, 5 mg, Oral, TID PRN  oxyCODONE, 10 mg, Oral, Q4H PRN x1 4/21  oxyCODONE, 5 mg, Oral, Q4H PRN x2 4/21, x1 4/22        Network Utilization Review Department  ATTENTION: Please call with any questions or concerns to [phone] and carefully listen to the prompts so that you are directed to the right person  All voicemails are confidential   Smithland Glass all requests for admission clinical reviews, approved or denied determinations and any other requests to dedicated fax number below belonging to the campus where the patient is receiving treatment   List of dedicated fax numbers for the Facilities:  [address] DENIALS (Administrative/Medical Necessity) [phone]   [address] (Maternity/NICU/Pediatrics) [phone]   [address]  [address] Medical [place] [address] [address] [facility] 28 [address] [place] [address]          Silverio Castañeda RN   Registered Nurse   Specialty:  Perioperative   Utilization Review       Signed   Date of Service:  4/23/2022 12:32 PM                 Signed              Show:Clear all  [x]Manual[x]Template[x]Copied    Added by:  [x]Laura Trujillo RN      []Rikki for details    INPATIENT DAY 2   4/23     Date: 4/23                                           POD#: 3  Current Patient Class: Inpatient      Current Level of Care: med surg      Assessment/Plan: 60 y o  female, initial surgery date 4/20  Failed clamping trial of PCN 4/22  Developed leukocytosis and fever  Pt did well overnight, VSS  2 PCN draining clear yellow Pt also reports she voided one time today    Percutaneous Nephrostomy Drains will both be  maintained to gravity drainage and will be reevaluated as outpatient      Pertinent Labs/Diagnostic Results:                 Results from last 7 days   Lab Units 04/23/22  0948 04/22/22  0449 04/21/22  0539 04/20/22  1650 04/20/22  1650 04/20/22  1524   WBC Thousand/uL 11 39* 12 31* 8 52   < > 11 48*  --    HEMOGLOBIN g/dL 8 3* 7 8* 7 0*  --  6 7*  --    I STAT HEMOGLOBIN g/dl  --   --   --   --   --  7 5*   HEMATOCRIT % 27 0* 24 8* 22 6*  --  21 9*  --    HEMATOCRIT, ISTAT %  --   --   --   --   --  22*   PLATELETS Thousands/uL 276 263 293   < > 331  --     < > = values in this interval not displayed                    Results from last 7 days   Lab Units 04/23/22  0942 04/22/22  0449 04/21/22  0539 04/20/22  1650 04/20/22  1524   SODIUM mmol/L 139 139 138 140  --    POTASSIUM mmol/L 4 2 4 6 4 7 4 6  --    CHLORIDE mmol/L 102 105 106 107  --    CO2 mmol/L 28 30 25 23  --    CO2, I-STAT mmol/L  --   --   --   --  25   ANION GAP mmol/L 9 4 7 10  --    BUN mg/dL 12 19 18 20  --    CREATININE mg/dL 1 00 1 01 1 07 1 04  --    EGFR ml/min/1 73sq m 62 61 57 59  --    CALCIUM mg/dL 8 8 8 2* 8 1* 7 9*  --    CALCIUM, IONIZED, ISTAT mmol/L  --   --   --   --  1 15                          Results from last 7 days   Lab Units 04/23/22  1101 04/23/22  0943 04/23/22  0719 04/22/22  2033 04/22/22  1621 04/22/22  1112 04/22/22  0945 04/22/22  0700 04/21/22  2056 04/21/22  1827 04/21/22  1621 04/21/22  1057   POC GLUCOSE mg/dl 197* 225* 161* 205* 150* 169* 142* 136 176* 256* 221* 177*              Results from last 7 days   Lab Units 04/23/22  0942 04/22/22  0449 04/21/22  0539 04/20/22  1650   GLUCOSE RANDOM mg/dL 216* 133 222* 169*              No results found for: BETA-HYDROXYBUTYRATE                Results from last 7 days   Lab Units 04/20/22  1524   PH, VIOLETA I-STAT   7 385   PCO2, VIOLETA ISTAT mm HG 40 3*   PO2, VIOLETA ISTAT mm  0*   HCO3, VIOLETA ISTAT mmol/L 24 1   I STAT BASE EXC mmol/L -1   I STAT O2 SAT % 99*               Results from last 7 days   Lab Units 04/22/22  1436   BLOOD CULTURE   Received in Microbiology Lab  Culture in Progress                  Vital Signs:     Date/Time Temp Pulse Resp BP MAP (mmHg) SpO2 O2 Flow Rate (L/min) O2 Device Patient Position - Orthostatic VS   04/23/22 07:20:33 99 8 °F (37 7 °C) 89 16 124/75 91 95 % -- -- --   04/23/22 07:17:27 99 8 °F (37 7 °C) 91 16 124/75 91 92 % -- -- --   04/23/22 0257 99 3 °F (37 4 °C) 89 18 121/67 -- 96 % -- None (Room air) Lying   04/22/22 2243 99 °F (37 2 °C) 92 18 124/69 82 96 % -- None (Room air) Lying            Medications:   Scheduled Medications:   Cefazolin 2 gm /50 ml every 8 h last dose 4/23 0538   Pepcid 10 mg PO daily 4/23 0921   ferrous sulfate 325 mg PO BID 4/23 0920  Diflucan 200 mg daily PO 4/23 0900  Gabapentin 100 mg PO TID 4/23 0920   Lispro I unit SQ 4/23 0921    Potassium citrate 10 MEQ BID PO 4/23 7848      Continuous IV Infusions:   ml/h DC'd 4/23 1432         PRN Meds: None needed  4/23            Discharge Plan:  Discharge 4/23      Network Utilization Review Department  ATTENTION: Please call with any questions or concerns to [phone] and carefully listen to the prompts so that you are directed to the right person  All voicemails are confidential   Ryan Villalobos all requests for admission clinical reviews, approved or denied determinations and any other requests to dedicated fax number below belonging to the campus where the patient is receiving treatment   List of dedicated fax numbers for the Facilities:  [address] DENIALS (Administrative/Medical Necessity) [phone]   [address] (Maternity/NICU/Pediatrics) [address]   5000 [facility]  [phone]   8049 [facility] [phone]   [address] Medical [place] [address] [facility] [address] [address] [place] [address] [address] [place] [address] [phone]

## 2022-04-26 NOTE — TELEPHONE ENCOUNTER
Spoke with patient to see how she is doing since she is home from hospital   Patient is concerned with her nephrostomy tubes  She states she is not urinating anymore thru her urethra  The upper bag is not filling with much urine at all, and the lower bag is filling quickly  When asked if she is uncomfortable or in pain, patient states sometimes she feels pressure in her suprapubic area like she has to urinate but does not  She is asking if she needs to be concerned about any of this  She states she is afebrile and does not have any other concerns at this time  She also needs order for CT placed in chart  She will need to have this done prior to her appt with Dr Dean Gutierrez on 5/5/22      Please advise

## 2022-04-27 LAB
BACTERIA BLD CULT: NORMAL
COLOR STONE: NORMAL
COM MFR STONE: 10 %
COMMENT-STONE3: NORMAL
COMPOSITION: NORMAL
LABORATORY COMMENT REPORT: NORMAL
PHOTO: NORMAL
SIZE STONE: NORMAL MM
SPEC SOURCE SUBJ: NORMAL
STONE ANALYSIS-IMP: NORMAL
STONE ANALYSIS-IMP: NORMAL
URATE MFR STONE: 90 %
WT STONE: 3762 MG

## 2022-04-27 NOTE — TELEPHONE ENCOUNTER
Pt called to discuss upcoming appt had questions about procedure and had some leakage that started this morning  Pt stated that she would like a PA to call her back    Pt call [phone]

## 2022-04-28 NOTE — TELEPHONE ENCOUNTER
Pt called back about a VM that was left on her phone  She was unaware who called her and made me aware multiple times no name was given  Pt is requesting a call back from an AP, she said last time she spoke to a nurse - the nurse told her she didn't have the answers for her and was told an AP would be calling her  I did read her Agnes's message below, but she still has clinical questions

## 2022-04-28 NOTE — TELEPHONE ENCOUNTER
Called and answered patients questions to the best of my ability  She will attempt another capping trial this weekend and encouraged to contact us with any questions/concerns

## 2022-04-28 NOTE — TELEPHONE ENCOUNTER
Please let the patient know that it is common for her not to be voiding per her urethra at this time as this is likely refluxing upper ureteral stent up to her left kidney where she has her nephrostomies in place  This also is consistent with her large amount of urine drainage via the PCNs

## 2022-04-28 NOTE — TELEPHONE ENCOUNTER
Spoke with patient who states she was called by Dr Jermaine Mock on Saturday and instructed to try clamping tubes  She states her last attempt to cap both nephrostomy tubes resulted in her waking up in the morning not being able to urinate out of urethra and her back was soaked from leakage from the lower nephrostomy tube  She has both nephrostomy bags back on and the lower one is draining a great deal, however, the upper one has maybe a couple teaspoons  Patient still very discouraged that she is not consistently urinating thru urethra  She was not satisfied with the answer of as long as urine is draining, that is appropriate  She is asking for guidance on what to do as attempting to cap these tubes is not resulting in any improvement and there is inconsistency with her draining urine  She again asked if she could speak with a provider for even 5 minutes as her questions are not being answered by RN  She has an appt with Dr Jermaine Mock on 5/5/22 and is wondering if the nephrostomy tubes will be removed and will the stent be removed  Advised her the stent will be removed but not sure about nephrostomy tubes  Please advise

## 2022-05-02 ENCOUNTER — TELEPHONE (OUTPATIENT)
Dept: UROLOGY | Facility: CLINIC | Age: 57
End: 2022-05-02

## 2022-05-02 DIAGNOSIS — N20.0 STAGHORN RENAL CALCULUS: Primary | ICD-10-CM

## 2022-05-03 ENCOUNTER — HOSPITAL ENCOUNTER (OUTPATIENT)
Dept: CT IMAGING | Facility: HOSPITAL | Age: 57
Discharge: HOME | End: 2022-05-03
Attending: UROLOGY
Payer: MEDICARE

## 2022-05-03 DIAGNOSIS — N20.0 STAGHORN RENAL CALCULUS: ICD-10-CM

## 2022-05-03 PROCEDURE — G1004 CDSM NDSC: HCPCS

## 2022-05-03 PROCEDURE — 74176 CT ABD & PELVIS W/O CONTRAST: CPT

## 2022-05-03 NOTE — TELEPHONE ENCOUNTER
I called pt to check on her  She tried capping both PCNs 6 days ago but had leakage around lower pole PCN (newer PCN) so opened back  Lower PCN to drainage  Of note she has minimal urine via voiding, most coming out bag  I think she is refluxing along stent from right side to left  Discussed options of cap trial again vs getting CT scan and if looks good removing stent (and upper pole PCN) and then removing lower pole if does well with subsequent cap trial      She will try capping lower pcn tomorrow  Will order stat CT scan as she is seeing me in 2 days

## 2022-05-03 NOTE — TELEPHONE ENCOUNTER
Spoke with patient and provided her with central scheduling phone number so she can have STAT CT done prior to her appt on 5/5/22  She verbalized understanding and agrees to plan

## 2022-05-04 LAB — BACTERIA UR CULT: ABNORMAL

## 2022-05-05 ENCOUNTER — PROCEDURE VISIT (OUTPATIENT)
Dept: UROLOGY | Facility: AMBULATORY SURGERY CENTER | Age: 57
End: 2022-05-05
Payer: MEDICARE

## 2022-05-05 VITALS
HEART RATE: 88 BPM | DIASTOLIC BLOOD PRESSURE: 88 MMHG | HEIGHT: 64 IN | BODY MASS INDEX: 30.05 KG/M2 | WEIGHT: 176 LBS | SYSTOLIC BLOOD PRESSURE: 130 MMHG

## 2022-05-05 DIAGNOSIS — N39.0 RECURRENT UTI: ICD-10-CM

## 2022-05-05 DIAGNOSIS — N20.0 STAGHORN RENAL CALCULUS: Primary | ICD-10-CM

## 2022-05-05 PROCEDURE — 96372 THER/PROPH/DIAG INJ SC/IM: CPT

## 2022-05-05 RX ORDER — AMOXICILLIN AND CLAVULANATE POTASSIUM 875; 125 MG/1; MG/1
1 TABLET, FILM COATED ORAL 2 TIMES DAILY
COMMUNITY
Start: 2022-04-08 | End: 2022-06-09

## 2022-05-05 RX ORDER — CEFTRIAXONE 1 G/1
1000 INJECTION, POWDER, FOR SOLUTION INTRAMUSCULAR; INTRAVENOUS ONCE
Status: COMPLETED | OUTPATIENT
Start: 2022-05-05 | End: 2022-05-05

## 2022-05-05 RX ADMIN — CEFTRIAXONE 1000 MG: 1 INJECTION, POWDER, FOR SOLUTION INTRAMUSCULAR; INTRAVENOUS at 15:40

## 2022-05-05 NOTE — PROGRESS NOTES
Assessment/Plan:    Staghorn renal calculus  Patient sp 2 stage PCNL with almost complete removal of stone  Reisudal stone in lower pole does not have to be treated and pt did not want  Additional surgery now  We discussed stent removal and PCN removal vs staged approach and she elected for removal of stents + PCNs  Rocephin x 1 given today  Plan for US in 6 weeks and if this is normal then CT in 6 months (since uric acid stones are often radioopaque)  Subjective:      Patient ID: Henny Becerra is a 60 y o  female  RAVI Middleton a 60 y  o  female patient with a left staghorn composed primarily of uric acid      The patient had Right-sided flank pain and a  E coli urine tract infection treated with Keflex   A CT non con was done showing LEFT staghorn occupying interpolar and lower poles with upper pole cystic lesion possibly representing dilated calyx   The radiology read was also slightly concerning for possible XGP kidney based on findings of stranding and perirenal lymphadenopathy      Patient denied any pain on the left side  Also denied recurrent infections or pyelonephritis on the left side      She does have a history of recurrent nephrolithiasis and has had multiple lithotripsies surgeries in the past all before 2016  Elmira Galla were apparently done at [facility] by Dr Maddison Lucero we cannot see operative notes      Because of CT scan findings concerning for XGP kidney a repeat CT scan with contrast was obtained which showed staghorn and not XGP        Pt elected for left PCNL  Left PCNU placed 3/1/22 with IR and PCNL performed 3/7/22, however could not access large lower pole stone burden  PCN was capped but kept in place to facilitate future PCNU placement  Patient had a longer hospital stay because of fever on postop day 1 with negative cultures but possible pneumonia so sent out on antibiotics  She then had 2nd stage PCNL 4/20/22    She had a low-grade fever and some leakage when PCNs were cap so went home with PCNs to drainage  She had minimal voiding via urethra  She attempted capped trialed home and had leakage around her lower pole PCN  She then attempted clamp trial again 2 days ago and has been doing well without pain or fever or leakage  Follow-up CT scan May 3, 2022 showed minimal residual stone burden in the left lower pole without hydronephrosis (did have pelvic fullness) or perinephric fluid  Stone analysis showed 80-90% uric acid, 10-20% CaOx Monohydrate  She is taking potassium citrate    Cystoscopy for stent removal today  Also removed both PCNs  Urology hx also significant for previously reported difficulty voiding in the mornings  Normajean Sax over the she feels like she is emptying better   PVR 17 cc      She has a history of hysterectomy 20 years ago for severe endometriosis associated with bladder involvement/ injury at the time that required urological repair      Patient's A1c was 13%, now down to 8%    Past Surgical History:   Procedure Laterality Date     SECTION      FL RETROGRADE PYELOGRAM  3/7/2022    FL RETROGRADE PYELOGRAM  2022    HYSTERECTOMY      age 37 per MRS    IR NEPHROSTOMY TUBE PLACEMENT  2022    IR NEPHROURETERAL ACCESS FOR UROLOGY PCNL  3/1/2022    OOPHORECTOMY Bilateral     age 37 per MRS, with hysterectomy    MN PERCUT REMV KID STONE,2+ CM Left 3/7/2022    Procedure: NEPHROLITHOTOMY  PERCUTANEOUS (PCNL); Surgeon: Mayra Ornelas MD;  [facility];  Service: Urology    MN PERCUT [address] TO 2 CM Left 2022    Procedure: NEPHROLITHOTOMY  PERCUTANEOUS (PCNL);   Surgeon: Mayra Ornelas MD;  [facility];  Service: Urology    TOTAL ABDOMINAL HYSTERECTOMY W/ BILATERAL SALPINGOOPHORECTOMY          Past Medical History:   Diagnosis Date    Abnormal LFTs (liver function tests)     last assessed 14    Anemia     last assessed 14    Anesthesia complication Restrictive airway diesease- has asthma attack when extubated    Asthma     Colon polyp     Diabetes mellitus ([facility] )     Diabetes mellitus due to underlying condition with hyperglycemia, without long-term current use of insulin ([place] [id number] ) 9/11/2020    Elevated blood pressure reading     last assessed 06/17/13    GERD (gastroesophageal reflux disease)     Hematuria     last assessed 09/11/14    Hypercalcemia     last assessed 09/11/14    Hyperlipidemia     Kidney stone     Leukocytosis     last assessed 09/11/124    Microalbuminuria     last assessed 09/11/14    Reactive airway disease     Restrictive lung disease     Type 2 diabetes mellitus with hyperglycemia ([place] [address] ) 7/2/2013    Type 2 diabetes mellitus without complication, without long-term current use of insulin ([place] [address] ) 9/11/2020             Review of Systems   Constitutional: Negative for chills and fever  HENT: Negative for ear pain and sore throat  Eyes: Negative for pain and visual disturbance  Respiratory: Negative for cough and shortness of breath  Cardiovascular: Negative for chest pain and palpitations  Gastrointestinal: Negative for abdominal pain and vomiting  Genitourinary: Negative for dysuria and hematuria  Musculoskeletal: Negative for arthralgias and back pain  Skin: Negative for color change and rash  Neurological: Negative for seizures and syncope  All other systems reviewed and are negative  Objective:      /88   Pulse 88   Ht 5' 4" (1 626 m)   Wt 79 8 kg (176 lb)   BMI 30 21 kg/m²     No results found for: PSA       Physical Exam  Vitals reviewed  Constitutional:       General: She is not in acute distress  Appearance: Normal appearance  She is not ill-appearing, toxic-appearing or diaphoretic  HENT:      Head: Normocephalic and atraumatic  Eyes:      Extraocular Movements: Extraocular movements intact  Pupils: Pupils are equal, round, and reactive to light     Pulmonary:      Effort: Pulmonary effort is normal    Abdominal:      General: Abdomen is flat  There is no distension  Palpations: Abdomen is soft  There is no mass  Tenderness: There is no abdominal tenderness  There is no guarding or rebound  Hernia: No hernia is present  Comments: Both PCNs were capped  Both were cut and removed intact  Skin:     General: Skin is warm  Neurological:      General: No focal deficit present  Mental Status: She is alert and oriented to person, place, and time  Mental status is at baseline  Psychiatric:         Mood and Affect: Mood normal          Behavior: Behavior normal          Thought Content: Thought content normal             Cystoscopy     Date/Time 5/6/2022 10:23 PM     Performed by  Marley Wright MD     Authorized by Marley Wright MD      Universal Protocol:  Consent: Written consent obtained  Risks and benefits: risks, benefits and alternatives were discussed  Consent given by: patient  Time out: Immediately prior to procedure a "time out" was called to verify the correct patient, procedure, equipment, support staff and site/side marked as required  Patient understanding: patient states understanding of the procedure being performed  Patient consent: the patient's understanding of the procedure matches consent given  Procedure consent: procedure consent matches procedure scheduled  Patient identity confirmed: verbally with patient        Procedure Details:  Procedure type: cystoscopy    Patient tolerance: Patient tolerated the procedure well with no immediate complications    Additional Procedure Details: A time-out was performed identifying the correct patient site and procedure  A MA chaperone was in the room  A flexible cystoscope was introduced into the urethra  The urethra was normal   The bladder was entered and the stent was found    A thorough examination bladder was not readily possible because of visualization related to stent and patient discomfort  Therefore this was not attempted  The stent was grabbed with a grasper and removed its entirety without difficulty  Orders  Orders Placed This Encounter   Procedures    Cystoscopy     This order was created via procedure documentation    US kidney and bladder     Pt with hx of stone surgery  Assess for hydronephrosis     Standing Status:   Future     Standing Expiration Date:   5/5/2026     Scheduling Instructions:      13 years and Up - 1)  Full bladder required  2)  Please drink 24-32 oz of water 1 hour prior to appointment time  3)  No voiding 1 hour prior to appointment time  "Prep required if being scheduled in conjunction withother studies, refer to those examination's Preps first before scheduling  All patients for US Kidney and Bladder they must drink 24 oz of water 60 minutes before your scheduled appointment time  This test requiresyou to have a FULL bladder  Please do not urinate before your test             Please bring your insurance cards, a form of photo ID and a list of your medications with you  Arrive 15 minutes prior to yourappointment time in order to register  If you need to have lab work or a urinalysis, please do this AFTER your ultrasound  To schedulethis appointment, please contact Central Scheduling at [phone]  Order Specific Question:   Is a Renal Artery Doppler also being requested in addition to the Kidney/Renal ultrasound ? Answer:   No    CT abdomen pelvis wo contrast     Stone protocol CT (low dose)     Standing Status:   Future     Standing Expiration Date:   5/5/2026     Scheduling Instructions:      FOR PO CONTRAST:      The patient will need to drink barium for this test  The barium needs to be picked up in the registration area at least one day prior to the patients study   For out of network (non-[facility]) orders please bring your prescription when picking up oral contrast  Further instructions will be given at       time of pickup  FOR ALL OTHER CONTRAST SCENARIOS:      Please refer to the Patient Instructions in the Appointment Review window at scheduling  If possible wear clothing without any metal in the abdomen area  Sweat suit, sports bra or bra without underwire may eliminate the need to change  Please bring your insurance cards, a form of photo ID and a list of your medications with you  Arrive 15 minutes prior to your appointment time in       order to register  On the day of your test, please bring any prior CT or MRI studies of this area with you that were not performed at a [facility]  To schedule this appointment, please contact Central Scheduling at [phone]  Order Specific Question:   What is the patient's sedation requirement? Answer:   No Sedation     Order Specific Question:   Contrast Information:     Answer:   No contrast     Order Specific Question:   Release to patient through Mychart     Answer:   Immediate     Order Specific Question:   Reason for Exam (FREE TEXT)     Answer:   Evaluate for stone in pt with hx of staghorn calculus     Order Specific Question:   Is the patient pregnant? Answer:    No

## 2022-05-06 PROCEDURE — 52000 CYSTOURETHROSCOPY: CPT | Performed by: UROLOGY

## 2022-05-07 NOTE — ASSESSMENT & PLAN NOTE
Patient sp 2 stage PCNL with almost complete removal of stone  Reisudal stone in lower pole does not have to be treated and pt did not want  Additional surgery now  We discussed stent removal and PCN removal vs staged approach and she elected for removal of stents + PCNs  Rocephin x 1 given today  Plan for US in 6 weeks and if this is normal then CT in 6 months (since uric acid stones are often radioopaque)

## 2022-05-21 DIAGNOSIS — E11.65 UNCONTROLLED TYPE 2 DIABETES MELLITUS WITH HYPERGLYCEMIA (HCC): ICD-10-CM

## 2022-05-21 RX ORDER — EMPAGLIFLOZIN 25 MG/1
TABLET, FILM COATED ORAL
Qty: 90 TABLET | Refills: 1 | Status: SHIPPED | OUTPATIENT
Start: 2022-05-21 | End: 2022-06-09 | Stop reason: SDUPTHER

## 2022-06-06 ENCOUNTER — APPOINTMENT (OUTPATIENT)
Dept: LAB | Facility: CLINIC | Age: 57
End: 2022-06-06
Payer: MEDICARE

## 2022-06-06 DIAGNOSIS — E78.2 MIXED HYPERLIPIDEMIA: ICD-10-CM

## 2022-06-06 DIAGNOSIS — I10 BENIGN ESSENTIAL HYPERTENSION: ICD-10-CM

## 2022-06-06 DIAGNOSIS — D50.8 IRON DEFICIENCY ANEMIA SECONDARY TO INADEQUATE DIETARY IRON INTAKE: ICD-10-CM

## 2022-06-06 DIAGNOSIS — E11.65 UNCONTROLLED TYPE 2 DIABETES MELLITUS WITH HYPERGLYCEMIA (HCC): ICD-10-CM

## 2022-06-06 LAB
ALBUMIN SERPL BCP-MCNC: 4.3 G/DL (ref 3.5–5)
ALP SERPL-CCNC: 107 U/L (ref 34–104)
ALT SERPL W P-5'-P-CCNC: 51 U/L (ref 7–52)
ANION GAP SERPL CALCULATED.3IONS-SCNC: 9 MMOL/L (ref 4–13)
AST SERPL W P-5'-P-CCNC: 31 U/L (ref 13–39)
BILIRUB SERPL-MCNC: 0.23 MG/DL (ref 0.2–1)
BUN SERPL-MCNC: 19 MG/DL (ref 5–25)
CALCIUM SERPL-MCNC: 9.5 MG/DL (ref 8.4–10.2)
CHLORIDE SERPL-SCNC: 104 MMOL/L (ref 96–108)
CHOLEST SERPL-MCNC: 161 MG/DL
CO2 SERPL-SCNC: 27 MMOL/L (ref 21–32)
CREAT SERPL-MCNC: 0.72 MG/DL (ref 0.6–1.3)
CREAT UR-MCNC: 69.9 MG/DL
ERYTHROCYTE [DISTWIDTH] IN BLOOD BY AUTOMATED COUNT: 15.2 % (ref 11.6–15.1)
EST. AVERAGE GLUCOSE BLD GHB EST-MCNC: 134 MG/DL
FERRITIN SERPL-MCNC: 5 NG/ML (ref 8–388)
GFR SERPL CREATININE-BSD FRML MDRD: 92 ML/MIN/1.73SQ M
GLUCOSE P FAST SERPL-MCNC: 103 MG/DL (ref 65–99)
HBA1C MFR BLD: 6.3 %
HCT VFR BLD AUTO: 33.9 % (ref 34.8–46.1)
HDLC SERPL-MCNC: 42 MG/DL
HGB BLD-MCNC: 10 G/DL (ref 11.5–15.4)
LDLC SERPL CALC-MCNC: 64 MG/DL (ref 0–100)
MCH RBC QN AUTO: 23.4 PG (ref 26.8–34.3)
MCHC RBC AUTO-ENTMCNC: 29.5 G/DL (ref 31.4–37.4)
MCV RBC AUTO: 79 FL (ref 82–98)
MICROALBUMIN UR-MCNC: 144 MG/L (ref 0–20)
MICROALBUMIN/CREAT 24H UR: 206 MG/G CREATININE (ref 0–30)
PLATELET # BLD AUTO: 524 THOUSANDS/UL (ref 149–390)
PMV BLD AUTO: 9.7 FL (ref 8.9–12.7)
POTASSIUM SERPL-SCNC: 4.6 MMOL/L (ref 3.5–5.3)
PROT SERPL-MCNC: 7.1 G/DL (ref 6.4–8.4)
RBC # BLD AUTO: 4.27 MILLION/UL (ref 3.81–5.12)
SODIUM SERPL-SCNC: 140 MMOL/L (ref 135–147)
TRIGL SERPL-MCNC: 275 MG/DL
VIT B12 SERPL-MCNC: 1213 PG/ML (ref 100–900)
WBC # BLD AUTO: 10.03 THOUSAND/UL (ref 4.31–10.16)

## 2022-06-06 PROCEDURE — 82043 UR ALBUMIN QUANTITATIVE: CPT

## 2022-06-06 PROCEDURE — 80053 COMPREHEN METABOLIC PANEL: CPT

## 2022-06-06 PROCEDURE — 82570 ASSAY OF URINE CREATININE: CPT

## 2022-06-06 PROCEDURE — 82728 ASSAY OF FERRITIN: CPT

## 2022-06-06 PROCEDURE — 36415 COLL VENOUS BLD VENIPUNCTURE: CPT

## 2022-06-06 PROCEDURE — 82607 VITAMIN B-12: CPT

## 2022-06-06 PROCEDURE — 80061 LIPID PANEL: CPT

## 2022-06-06 PROCEDURE — 85027 COMPLETE CBC AUTOMATED: CPT

## 2022-06-06 PROCEDURE — 83036 HEMOGLOBIN GLYCOSYLATED A1C: CPT

## 2022-06-07 ENCOUNTER — HOSPITAL ENCOUNTER (OUTPATIENT)
Dept: ULTRASOUND IMAGING | Facility: HOSPITAL | Age: 57
Discharge: HOME | End: 2022-06-07
Attending: UROLOGY
Payer: MEDICARE

## 2022-06-07 DIAGNOSIS — N20.0 STAGHORN RENAL CALCULUS: ICD-10-CM

## 2022-06-07 PROCEDURE — 76770 US EXAM ABDO BACK WALL COMP: CPT

## 2022-06-09 ENCOUNTER — CLINICAL SUPPORT (OUTPATIENT)
Dept: INTERNAL MEDICINE CLINIC | Facility: CLINIC | Age: 57
End: 2022-06-09
Payer: MEDICARE

## 2022-06-09 ENCOUNTER — OFFICE VISIT (OUTPATIENT)
Dept: INTERNAL MEDICINE CLINIC | Facility: CLINIC | Age: 57
End: 2022-06-09
Payer: MEDICARE

## 2022-06-09 VITALS
BODY MASS INDEX: 29.84 KG/M2 | HEIGHT: 64 IN | RESPIRATION RATE: 16 BRPM | DIASTOLIC BLOOD PRESSURE: 64 MMHG | HEART RATE: 80 BPM | OXYGEN SATURATION: 98 % | TEMPERATURE: 98.2 F | WEIGHT: 174.8 LBS | SYSTOLIC BLOOD PRESSURE: 118 MMHG

## 2022-06-09 DIAGNOSIS — E11.9 TYPE 2 DIABETES MELLITUS WITHOUT COMPLICATION, WITHOUT LONG-TERM CURRENT USE OF INSULIN (HCC): Primary | ICD-10-CM

## 2022-06-09 DIAGNOSIS — I10 BENIGN ESSENTIAL HYPERTENSION: ICD-10-CM

## 2022-06-09 DIAGNOSIS — F32.2 CURRENT SEVERE EPISODE OF MAJOR DEPRESSIVE DISORDER WITHOUT PSYCHOTIC FEATURES, UNSPECIFIED WHETHER RECURRENT (HCC): ICD-10-CM

## 2022-06-09 DIAGNOSIS — Z23 ENCOUNTER FOR IMMUNIZATION: ICD-10-CM

## 2022-06-09 DIAGNOSIS — E78.2 MIXED HYPERLIPIDEMIA: ICD-10-CM

## 2022-06-09 DIAGNOSIS — F90.0 ATTENTION DEFICIT HYPERACTIVITY DISORDER (ADHD), PREDOMINANTLY INATTENTIVE TYPE: ICD-10-CM

## 2022-06-09 DIAGNOSIS — J30.9 ALLERGIC RHINITIS, UNSPECIFIED SEASONALITY, UNSPECIFIED TRIGGER: ICD-10-CM

## 2022-06-09 DIAGNOSIS — D50.8 IRON DEFICIENCY ANEMIA SECONDARY TO INADEQUATE DIETARY IRON INTAKE: ICD-10-CM

## 2022-06-09 DIAGNOSIS — E11.65 UNCONTROLLED TYPE 2 DIABETES MELLITUS WITH HYPERGLYCEMIA (HCC): ICD-10-CM

## 2022-06-09 DIAGNOSIS — N20.0 STAGHORN RENAL CALCULUS: ICD-10-CM

## 2022-06-09 DIAGNOSIS — J45.30 MILD PERSISTENT ASTHMA, UNSPECIFIED WHETHER COMPLICATED: ICD-10-CM

## 2022-06-09 DIAGNOSIS — J45.30 MILD PERSISTENT ASTHMA WITHOUT COMPLICATION: ICD-10-CM

## 2022-06-09 DIAGNOSIS — G47.33 MODERATE OBSTRUCTIVE SLEEP APNEA: ICD-10-CM

## 2022-06-09 PROBLEM — R21 SKIN RASH: Status: RESOLVED | Noted: 2017-09-21 | Resolved: 2022-06-09

## 2022-06-09 PROBLEM — R20.2 PARESTHESIA: Status: RESOLVED | Noted: 2017-09-21 | Resolved: 2022-06-09

## 2022-06-09 PROBLEM — N39.0 UTI (URINARY TRACT INFECTION): Status: RESOLVED | Noted: 2017-08-07 | Resolved: 2022-06-09

## 2022-06-09 PROCEDURE — 99214 OFFICE O/P EST MOD 30 MIN: CPT | Performed by: INTERNAL MEDICINE

## 2022-06-09 PROCEDURE — 90471 IMMUNIZATION ADMIN: CPT

## 2022-06-09 PROCEDURE — 90677 PCV20 VACCINE IM: CPT

## 2022-06-09 RX ORDER — DULAGLUTIDE 1.5 MG/.5ML
1.5 INJECTION, SOLUTION SUBCUTANEOUS WEEKLY
Qty: 2 ML | Refills: 2 | Status: SHIPPED | OUTPATIENT
Start: 2022-06-09 | End: 2022-08-15 | Stop reason: DRUGHIGH

## 2022-06-09 RX ORDER — BUPROPION HYDROCHLORIDE 150 MG/1
150 TABLET ORAL DAILY
Qty: 30 TABLET | Refills: 2 | Status: SHIPPED | OUTPATIENT
Start: 2022-06-09 | End: 2022-06-09 | Stop reason: SDUPTHER

## 2022-06-09 RX ORDER — ATORVASTATIN CALCIUM 40 MG/1
40 TABLET, FILM COATED ORAL
Qty: 90 TABLET | Refills: 3
Start: 2022-06-09 | End: 2022-06-13 | Stop reason: DRUGHIGH

## 2022-06-09 RX ORDER — ATORVASTATIN CALCIUM 20 MG/1
20 TABLET, FILM COATED ORAL
Qty: 90 TABLET | Refills: 3 | OUTPATIENT
Start: 2022-06-09

## 2022-06-09 RX ORDER — BUPROPION HYDROCHLORIDE 150 MG/1
150 TABLET ORAL DAILY
Qty: 30 TABLET | Refills: 2 | Status: SHIPPED | OUTPATIENT
Start: 2022-06-09

## 2022-06-09 RX ORDER — INSULIN GLARGINE 100 [IU]/ML
15 INJECTION, SOLUTION SUBCUTANEOUS
Qty: 15 ML | Refills: 3 | Status: SHIPPED | OUTPATIENT
Start: 2022-06-09

## 2022-06-09 RX ORDER — BLOOD SUGAR DIAGNOSTIC
1 STRIP MISCELLANEOUS 3 TIMES DAILY
Qty: 300 EACH | Refills: 3 | Status: SHIPPED | OUTPATIENT
Start: 2022-06-09

## 2022-06-09 RX ORDER — DULAGLUTIDE 0.75 MG/.5ML
0.75 INJECTION, SOLUTION SUBCUTANEOUS
Qty: 2 ML | Refills: 3 | OUTPATIENT
Start: 2022-06-09

## 2022-06-09 RX ORDER — LISINOPRIL 5 MG/1
5 TABLET ORAL DAILY
Qty: 90 TABLET | Refills: 3 | Status: SHIPPED | OUTPATIENT
Start: 2022-06-09

## 2022-06-09 RX ORDER — MONTELUKAST SODIUM 10 MG/1
10 TABLET ORAL EVERY EVENING
Qty: 90 TABLET | Refills: 3 | Status: SHIPPED | OUTPATIENT
Start: 2022-06-09

## 2022-06-09 RX ORDER — ALBUTEROL SULFATE 90 UG/1
1-2 AEROSOL, METERED RESPIRATORY (INHALATION) EVERY 4 HOURS PRN
Qty: 18 G | Refills: 3 | Status: SHIPPED | OUTPATIENT
Start: 2022-06-09

## 2022-06-09 RX ORDER — METFORMIN HYDROCHLORIDE 500 MG/1
1000 TABLET, EXTENDED RELEASE ORAL 2 TIMES DAILY WITH MEALS
Qty: 360 TABLET | Refills: 3 | Status: SHIPPED | OUTPATIENT
Start: 2022-06-09

## 2022-06-09 NOTE — PROGRESS NOTES
Assessment/Plan:    Type 2 diabetes mellitus without complication, without long-term current use of insulin ([facility])  Continue current medications  She is on an ACE inh and statin    Lab Results   Component Value Date    HGBA1C 6 3 (H) 06/06/2022       Moderate obstructive sleep apnea  Cannot tolerate CPAP    Mild persistent asthma without complication  Controlled on Singulair      Benign essential hypertension  Controlled on lisinopril     Hyperlipidemia  TG remains elevated  LDL controlled    Anemia  Resume ferrrous sulfate every other day    Staghorn renal calculus  Overall , she has done well post PCNL  She will continue to f/u with urology  US pending    Current severe episode of major depressive disorder without psychotic features ([facility] [address] )  ADHD with underlying depression  Agrees to starting bupropion         Problem List Items Addressed This Visit        Endocrine    Type 2 diabetes mellitus without complication, without long-term current use of insulin ([facility] ) - Primary     Continue current medications  She is on an ACE inh and statin    Lab Results   Component Value Date    HGBA1C 6 3 (H) 06/06/2022              Relevant Orders    Hemoglobin A1C    Comprehensive metabolic panel    CBC and Platelet    Microalbumin / creatinine urine ratio    TSH, 3rd generation with Free T4 reflex    Lipid Panel with Direct LDL reflex       Respiratory    Mild persistent asthma without complication     Controlled on Singulair             Moderate obstructive sleep apnea     Cannot tolerate CPAP              Cardiovascular and Mediastinum    Benign essential hypertension     Controlled on lisinopril            Relevant Orders    Comprehensive metabolic panel    CBC and Platelet       Genitourinary    Staghorn renal calculus     Overall , she has done well post PCNL  She will continue to f/u with urology  US pending              Other    Hyperlipidemia     TG remains elevated  LDL controlled           Relevant Medications atorvastatin (LIPITOR) 40 mg tablet    Other Relevant Orders    Comprehensive metabolic panel    Lipid Panel with Direct LDL reflex    Anemia     Resume ferrrous sulfate every other day           Relevant Orders    CBC and Platelet    Ferritin    Current severe episode of major depressive disorder without psychotic features ([address] )     ADHD with underlying depression  Agrees to starting bupropion           Relevant Medications    buPROPion (Wellbutrin XL) 150 mg 24 hr tablet    Attention deficit hyperactivity disorder (ADHD), predominantly inattentive type    Relevant Medications    buPROPion (Wellbutrin XL) 150 mg 24 hr tablet    Other Relevant Orders    Ambulatory Referral to Psychiatry      Other Visit Diagnoses     Encounter for immunization        Relevant Orders    Pneumococcal Conjugate Vaccine 20-valent (PCV20) (Completed)            Subjective:      Patient ID: Fouzia Cruz is a 60 y o  female  HPI  Recent labs reviewed and anemia improved, low ferritin  Anemia in setting of blood loss after 2 stage PCNL for Staghorn calculus on the left  A1C improved to 6 2 TG remains high in 200s  FBS 130s, denies hypoglycemic spells  Hesitancy only during first void in the morning   Denies flank pain, dysuria, hematuria  She just had an US that is not read yet  C/o trouble focusing for a long time and worse now  It is causing a lot of arguments with her   She cannot finish at task, gets easily distracted  Still with so many boxes from moving a long time ago    The following portions of the patient's history were reviewed and updated as appropriate: allergies, current medications, past family history, past medical history, past social history, past surgical history and problem list     Review of Systems   Constitutional: Negative for chills, fever and unexpected weight change  Respiratory: Negative for cough and shortness of breath  Cardiovascular: Negative for chest pain and palpitations  Gastrointestinal: Negative for abdominal pain, constipation and diarrhea  Genitourinary: Positive for difficulty urinating (hesitancy only first thing in the morning)  Neurological: Positive for headaches (chronic migraines)  Negative for dizziness  Psychiatric/Behavioral: Positive for sleep disturbance  Objective:      /64   Pulse 80   Temp 98 2 °F (36 8 °C)   Resp 16   Ht 5' 4" (1 626 m)   Wt 79 3 kg (174 lb 12 8 oz)   SpO2 98%   BMI 30 00 kg/m²          Physical Exam  Constitutional:       General: She is not in acute distress  Appearance: She is well-developed  She is obese  She is not ill-appearing, toxic-appearing or diaphoretic  HENT:      Head: Normocephalic and atraumatic  Eyes:      Conjunctiva/sclera: Conjunctivae normal    Cardiovascular:      Rate and Rhythm: Normal rate and regular rhythm  Heart sounds: Normal heart sounds  No murmur heard  Pulmonary:      Effort: Pulmonary effort is normal  No respiratory distress  Breath sounds: Normal breath sounds  No wheezing or rales  Abdominal:      General: There is no distension  Palpations: Abdomen is soft  There is no mass  Tenderness: There is no abdominal tenderness  There is no guarding or rebound  Musculoskeletal:      Cervical back: Neck supple  Right lower leg: No edema  Left lower leg: No edema  Skin:     General: Skin is warm and dry  Neurological:      Mental Status: She is alert and oriented to person, place, and time  Psychiatric:         Mood and Affect: Mood normal          Behavior: Behavior normal          Thought Content:  Thought content normal          Judgment: Judgment normal       Comments: [id number]

## 2022-06-09 NOTE — ASSESSMENT & PLAN NOTE
Continue current medications  She is on an ACE inh and statin    Lab Results   Component Value Date    HGBA1C 6 3 (H) 06/06/2022

## 2022-06-09 NOTE — PATIENT INSTRUCTIONS
Bupropion (By mouth)   Bupropion ([id number])  Treats depression and aids in quitting smoking  Also prevents depression caused by seasonal affective disorder (SAD)  Brand Name(s): Aplenzin, Forfivo XL, Wellbutrin, Wellbutrin SR, Wellbutrin XL   There may be other brand names for this medicine  When This Medicine Should Not Be Used: This medicine is not right for everyone  Do not use it if you had an allergic reaction to bupropion, or if you have seizures, anorexia, or bulimia  How to Use This Medicine:   Tablet, Long Acting Tablet  Take your medicine as directed  Your dose may need to be changed several times to find what works best for you  You may need to take Wellbutrin® for up to 4 weeks before you feel better  You may need to take Zyban® for 1 to 2 weeks before the date that you plan to stop smoking  Swallow the extended-release tablet whole  Do not crush, break, or chew it  It is best to take Aplenzin® in the morning  Do not take Wellbutrin® or Zyban® close to bedtime if you have trouble sleeping  Take it with food if it upsets your stomach or if you have nausea  If you take the extended-release tablet, part of the tablet may pass into your stools  This is normal and is nothing to worry about  This medicine should come with a Medication Guide  Ask your pharmacist for a copy if you do not have one  Missed dose: Skip the missed dose and go back to your regular dosing schedule  Never take extra medicine to make up for a missed dose  Store the medicine in a closed container at room temperature, away from heat, moisture, and direct light  Drugs and Foods to Avoid:   Ask your doctor or pharmacist before using any other medicine, including over-the-counter medicines, vitamins, and herbal products  Do not use this medicine and an MAO inhibitor (MAOI) within 14 days of each other   Do not use Zyban® to quit smoking if you already take Aplenzin® or Wellbutrin® for depression, because they are the same medicine  Tell your doctor if you take barbiturates, benzodiazepines, antiseizure medicine, or sedatives, or if you recently stopped taking them  Some medicines can affect how bupropion works  Tell your doctor if you use any of the following:   Amantadine, carbamazepine, cimetidine, clopidogrel, cyclophosphamide, digoxin, efavirenz, levodopa, lopinavir, nelfinavir, nicotine patch, orphenadrine, phenobarbital, phenytoin, ritonavir, tamoxifen, theophylline, thiotepa, ticlopidine  Beta blocker medicine (including metoprolol)  A blood thinner (including warfarin)  Insulin or diabetes medicine  Medicine to treat depression (including desipramine, fluoxetine, imipramine, nortriptyline, paroxetine, sertraline, venlafaxine)  Medicine to treat heart rhythm problems (including flecainide, propafenone)  Medicine to treat mental illness (including haloperidol, risperidone, thioridazine)  Steroid medicine (including dexamethasone, hydrocortisone, methylprednisolone, prednisolone, prednisone)  Do not drink alcohol while you are using this medicine  Tell your doctor if you use anything else that makes you sleepy  Some examples are allergy medicine, narcotic pain medicine, and alcohol  Warnings While Using This Medicine:   Tell your doctor if you are pregnant or breastfeeding, or if you have kidney disease, liver disease, heart disease, diabetes, glaucoma, mental illness (including bipolar disorder), or high blood pressure  Tell your doctor if you have a history of drug addiction or if you drink alcohol  For some children, teenagers, and young adults, this medicine may increase mental or emotional problems  This may lead to thoughts of suicide and violence  Talk with your doctor right away if you have any thoughts or behavior changes that concern you  Tell your doctor if you or anyone in your family has a history of bipolar disorder or suicide attempts    This medicine may cause the following problems:  Increased risk of seizures  Changes in mood or behavior  High blood pressure  Serious skin reactions  This medicine may make you dizzy or drowsy  Do not drive or do anything that could be dangerous until you know how this medicine affects you  Zyban® is only part of a complete program to help you quit smoking  You may still want to smoke at times  Have a plan to cope with these situations  Do not stop using this medicine suddenly  Your doctor will need to slowly decrease your dose before you stop it completely  Tell any doctor or dentist who treats you that you are using this medicine  This medicine may affect certain medical test results  Your doctor will check your progress and the effects of this medicine at regular visits  Keep all appointments  Keep all medicine out of the reach of children  Never share your medicine with anyone  Possible Side Effects While Using This Medicine:   Call your doctor right away if you notice any of these side effects: Allergic reaction: Itching or hives, swelling in your face or hands, swelling or tingling in your mouth or throat, chest tightness, trouble breathing  Blistering, peeling, red skin rash  Chest pain, trouble breathing, fast, slow, or uneven heartbeat  Eye pain, vision changes, seeing halos around lights  Muscle or joint pain, fever with rash  Seeing or hearing things that are not there, feeling like people are against you  Seizures  Sudden increase in energy, racing thoughts, trouble sleeping  Thoughts of hurting yourself, worsening depression, severe agitation or confusion  If you notice these less serious side effects, talk with your doctor:   Dry mouth  Headache, dizziness  Nausea, vomiting, constipation, diarrhea, gas, stomach pain  Weight gain or loss  If you notice other side effects that you think are caused by this medicine, tell your doctor  Call your doctor for medical advice about side effects   You may report side effects to FDA at 8-360-FDA-8228    © Copyright [employer] 2022 Information is for [employer] use only and may not be sold, redistributed or otherwise used for commercial purposes  The above information is an  only  It is not intended as medical advice for individual conditions or treatments  Talk to your doctor, nurse or pharmacist before following any medical regimen to see if it is safe and effective for you

## 2022-06-09 NOTE — PROGRESS NOTES
Sadie Pharmacist    Note was written by Coby Paredes, pharmacy Resident  RADHA MICHELE, Pharmacist was not present during the patient appointment and was available as needed  Cristobal Thomas, Pharmacist  have reviewed and discussed the case with the resident and agree with the findings and plan as documented  Reason for visit: Appointment with 60y o  year old for management of T2DM  Patient was seen immediately after PCP appointment  Current DM Regimen:  Trulicity  Jardiance  Lantus  Metformin    ASSESSMENT/PLAN                                                                                     Type 2 diabetes: goal A1c <6 5% based on AACE/ACE guidelines  Most recent A1c below goal     Reported FBG above goal, but improved  No signs or symptoms of hypoglycemia  Would benefit from Trulicity titration to assist with weight loss and BG control and adjustment of insulin to prevent hypoglycemia as Trulicity titration continues  BMP acceptable; patient was fasting  Duration: < 10 years (Diagnosed in 2013)  Microvascular complications: Microalbuminuria  Macrovascular complications: None  (Yes ) Aspirin (Yes ) Statin (Yes ) ACEI/ARB  Eye Exam:    Lab Results   Component Value Date    LEFTDIABRET None 03/23/2022    RIGHTDIABRET None 03/23/2022   Foot Exam: 02/2022, monofilament intact    Most recent labs and diabetes goals discussed with patient in clinic today  MEDICATIONS: Per PCP, will increase Trulicity to 4 4LJ and decrease Lantus to 15 units daily, continue metformin and Jardiance    SMBG: At least 1x/day    Follow-Up: 2 months, PCP in 1 month  _x_ Home Monitoring Records, BG    SUBJECTIVE                                                                                                 Medication Adherence: Medication list reviewed with patient, reports the following discrepancies/problems:  Jardiance Tabs- Well tolerated, reports no  symptoms  Catarino Rousseau Sopn- Taking 20 units nightly  Trulicity Sopn: Taking 8 94KN weekly  Administers on Wednesday night  First dose on May 11th    Misses doses: No    Took all DM and BP medications this AM: Yes    2  Medication Efficacy:    Review of Systems   Gastrointestinal: Positive for nausea ( Mild nausea day after Trulicity injection, tolerable and resolves on own shortly after onset)  Endocrine: Negative for polydipsia, polyphagia and polyuria  FBG readings (no log, per memory): 125-150  FBG today: 131    Hypoglycemia history: 0 SMBG readings < 70mg/dL since last appt   Knows how to treat: Yes   Unsymptomatic hypoglycemia: No, denies hypoglycemia s/sx since last appt    3  Lifestyle: Reports increased stress over past couple of months with surgeries  Sometimes snacks throughout the day without realizing  Has been trying to eat more healthy with yogurt, oatmeal, fruit       OBJECTIVE                                                                                           Previous DM medications trialed:  Ozempic (03/2022): Denied by insurance, switched to Johnathan and Madie (03/2697): Replaced with Jardiance    Pertinent Lab Data:   Patient was fasting for most recent labs  Lab Results   Component Value Date    SODIUM 140 06/06/2022    K 4 6 06/06/2022    EGFR 92 06/06/2022    CREATININE 0 72 06/06/2022    GLUF 103 (H) 06/06/2022    [id number] 1,213 (H) 06/06/2022    MICROALBCRE 206 (H) 06/06/2022     Lab Results   Component Value Date    HGBA1C 6 3 (H) 06/06/2022    HGBA1C 7 1 (H) 02/21/2022    HGBA1C 8 3 (H) 11/08/2021     Reason For Outreach  Embedded Pharmacist    Demographics  Interaction Method: Face to Face  Type of Intervention: Follow-Up    Topic(s) Addressed  Diabetes    Intervention(s) Made    Pharmacologic:    -- Medication Adjustment - Dose or Frequency    Non-Pharmacologic:    -- Care coordination  -- Home monitoring discussed or provided  -- Lifestyle education    Tool(s) Used  Not Applicable    Time Spent:   Time Spent in Direct Patient Care: 35 minutes  Time Spent in Care Coordination: 30 minutes    Recommendations  Recipient: Patient/caregiver  Outcome:  All Accepted

## 2022-06-13 ENCOUNTER — TELEPHONE (OUTPATIENT)
Dept: INTERNAL MEDICINE CLINIC | Facility: CLINIC | Age: 57
End: 2022-06-13

## 2022-06-13 ENCOUNTER — TELEPHONE (OUTPATIENT)
Dept: UROLOGY | Facility: CLINIC | Age: 57
End: 2022-06-13

## 2022-06-13 DIAGNOSIS — E78.2 MIXED HYPERLIPIDEMIA: Primary | ICD-10-CM

## 2022-06-13 RX ORDER — ATORVASTATIN CALCIUM 20 MG/1
20 TABLET, FILM COATED ORAL DAILY
Qty: 90 TABLET | Refills: 1 | Status: SHIPPED | OUTPATIENT
Start: 2022-06-13

## 2022-06-13 NOTE — PROGRESS NOTES
Note was written by Marcy Villeda, pharmacy Resident  Farrah Toledo, Pharmacist was not present during the patient appointment and was available as needed  Og Melchor, Pharmacist  have reviewed and discussed the case with the resident and agree with the findings and plan as documented      Reason For Outreach  Embedded Pharmacist    Demographics  Interaction Method: Chart Review (EMR)  Type of Intervention: New    Topic(s) Addressed  Diabetes    Intervention(s) Made      Non-Pharmacologic:    -- Care coordination    Tool(s) Used  Not Applicable    Time Spent:     Time Spent in Care Coordination: 20 minutes    Recommendations  Recipient: Not Applicable  Outcome: Not Applicable

## 2022-06-13 NOTE — TELEPHONE ENCOUNTER
Spoke to [employer] who stated brand is preferred for patient's insulin glargine  Called pharmacy who processed medication through as brand name Lantus and medication was approved  Left VM for patient to  medication when convenient      PCP: FADI, no action needed

## 2022-06-13 NOTE — TELEPHONE ENCOUNTER
Patient saw the results for her [address]  Based on her results patient called wanting to know if her appointment with Pieter Berry on  6/16/22 is necessary

## 2022-06-13 NOTE — TELEPHONE ENCOUNTER
We typically recommend following up to review, however it is her prerogative if she wishes to hold off on coming in and f/u in the future

## 2022-06-13 NOTE — TELEPHONE ENCOUNTER
Patient wants atorvastatin 20 mg to be sent to [facility] pharmacy as she is cutting her 40mg in half and no longer wants to do so      Medication pended for your approval

## 2022-06-14 ENCOUNTER — TELEPHONE (OUTPATIENT)
Dept: PSYCHIATRY | Facility: CLINIC | Age: 57
End: 2022-06-14

## 2022-06-16 ENCOUNTER — OFFICE VISIT (OUTPATIENT)
Dept: UROLOGY | Facility: CLINIC | Age: 57
End: 2022-06-16

## 2022-06-16 VITALS
SYSTOLIC BLOOD PRESSURE: 120 MMHG | BODY MASS INDEX: 29.53 KG/M2 | OXYGEN SATURATION: 100 % | HEART RATE: 88 BPM | DIASTOLIC BLOOD PRESSURE: 80 MMHG | HEIGHT: 64 IN | WEIGHT: 173 LBS

## 2022-06-16 DIAGNOSIS — N20.0 KIDNEY STONE: Primary | ICD-10-CM

## 2022-06-16 NOTE — PROGRESS NOTES
1  Kidney stone            Assessment and plan:       1  Kidney stones  - proper hydration and dietary modifications advised  - updated imaging in a few months  - call with symptoms     Peri Vega      Chief Complaint     Kidney stones    History of Present Illness     Joss Patten is a 60 y o  female patient of Dr Shiloh Geronimo with a history of staghorn calculus presenting for follow-up      History of a left staghorn calculus  Elected for left PCNL  Left PCNU placed 3/1/22 with IR  PCNL 3/7/22 followed by repeat PCNL 4/20/22  Underwent stent and nephrostomy tube removal 5/5/22  renal US (6/7/22)  IMPRESSION:     Mild fullness of left renal collecting system and extrarenal pelvis no greater than minimal left hydronephrosis      Left renal scarring noted  Echogenic shadowing structure at the lower pole the left kidney measuring up to 5 mm could represent nonobstructing stone fragment, parenchymal calcification, or milk of calcium within a small cyst      Small left renal cortical cysts noted largest measuring up to 13 mm  Laboratory     Lab Results   Component Value Date    CREATININE 0 89 10/14/2022     Review of Systems     Review of Systems   Constitutional: Negative for activity change, appetite change, chills, diaphoresis, fatigue, fever and unexpected weight change  Respiratory: Negative for chest tightness and shortness of breath  Cardiovascular: Negative for chest pain, palpitations and leg swelling  Gastrointestinal: Negative for abdominal distention, abdominal pain, constipation, diarrhea, nausea and vomiting  Genitourinary: Negative for decreased urine volume, difficulty urinating, dysuria, enuresis, flank pain, frequency, genital sores, hematuria and urgency  Musculoskeletal: Negative for back pain, gait problem and myalgias  Skin: Negative for color change, pallor, rash and wound  Psychiatric/Behavioral: Negative for behavioral problems   The patient is not nervous/anxious  Allergies     Allergies   Allergen Reactions   • Seasonal Ic  [Cholestatin] Allergic Rhinitis       Physical Exam     Physical Exam  Constitutional:       General: She is not in acute distress  Appearance: Normal appearance  She is normal weight  She is not ill-appearing, toxic-appearing or diaphoretic  HENT:      Head: Normocephalic and atraumatic  Eyes:      General:         Right eye: No discharge  Left eye: No discharge  Conjunctiva/sclera: Conjunctivae normal    Pulmonary:      Effort: Pulmonary effort is normal  No respiratory distress  Musculoskeletal:         General: No swelling or tenderness  Normal range of motion  Skin:     General: Skin is warm and dry  Coloration: Skin is not jaundiced or pale  Neurological:      General: No focal deficit present  Mental Status: She is alert and oriented to person, place, and time  Psychiatric:         Mood and Affect: Mood normal          Behavior: Behavior normal          Thought Content:  Thought content normal            Vital Signs     Vitals:    06/16/22 1107   BP: 120/80   BP Location: Left arm   Patient Position: Sitting   Cuff Size: Standard   Pulse: 88   SpO2: 100%   Weight: 78 5 kg (173 lb)   Height: 5' 4" (1 626 m)         Current Medications       Current Outpatient Medications:   •  albuterol (ProAir HFA) 90 mcg/act inhaler, Inhale 1-2 puffs every 4 (four) hours as needed for wheezing, Disp: 18 g, Rfl: 3  •  aspirin 81 MG tablet, Take 1 tablet by mouth daily, Disp: , Rfl:   •  cholecalciferol (VITAMIN D3) 1,000 units tablet, Take 1,000 Units by mouth daily, Disp: , Rfl:   •  Empagliflozin (Jardiance) 25 MG TABS, Take 1 tablet (25 mg total) by mouth in the morning, Disp: 90 tablet, Rfl: 3  •  glucose blood (OneTouch Ultra) test strip, Use 1 each 3 (three) times a day, Disp: 300 each, Rfl: 3  •  lisinopril (ZESTRIL) 5 mg tablet, Take 1 tablet (5 mg total) by mouth daily, Disp: 90 tablet, Rfl: 3  •  metFORMIN (GLUCOPHAGE-XR) 500 mg 24 hr tablet, Take 2 tablets (1,000 mg total) by mouth 2 (two) times a day with meals, Disp: 360 tablet, Rfl: 3  •  montelukast (SINGULAIR) 10 mg tablet, Take 1 tablet (10 mg total) by mouth every evening, Disp: 90 tablet, Rfl: 3  •  potassium citrate (UROCIT-K) 10 mEq, Take 1 tablet (10 mEq total) by mouth 2 (two) times a day, Disp: 120 tablet, Rfl: 6  •  buPROPion (WELLBUTRIN XL) 300 mg 24 hr tablet, TAKE 1 TABLET (300 MG TOTAL) BY MOUTH EVERY MORNING , Disp: 30 tablet, Rfl: 1  •  Dulaglutide 3 MG/0 5ML SOPN, Inject 0 5 mL (3 mg total) under the skin once a week For diabetes, Disp: 6 mL, Rfl: 1  •  Lancets (OneTouch Delica Plus SIXUHM18J) MISC, TEST 3 TIMES A DAY, Disp: 300 each, Rfl: 3  •  rosuvastatin (CRESTOR) 10 MG tablet, Take 1 tablet (10 mg total) by mouth daily, Disp: 90 tablet, Rfl: 3      Active Problems     Patient Active Problem List   Diagnosis   • Benign essential hypertension   • Hyperlipidemia   • Type 2 diabetes mellitus without complication, without long-term current use of insulin (HCC)   • Kidney stone   • Mild persistent asthma without complication   • Allergic rhinitis   • Anemia   • Staghorn renal calculus   • Incomplete emptying of bladder   • Restrictive airway disease   • Uric acid nephrolithiasis   • Elevated erythrocyte sedimentation rate   • Hematuria, microscopic   • Hot flashes due to menopause   • Irritable   • Irritable bowel syndrome   • Moderate obstructive sleep apnea   • Obesity   • Urge and stress incontinence   • Current severe episode of major depressive disorder without psychotic features (HCC)   • Attention deficit hyperactivity disorder (ADHD), predominantly inattentive type   • COVID-19 virus infection         Past Medical History     Past Medical History:   Diagnosis Date   • Abnormal LFTs (liver function tests)     last assessed 09/11/14   • Anemia     last assessed 07/28/14   • Anesthesia complication     Restrictive airway diesease- has asthma attack when extubated   • Asthma    • Asthma 2013   • Colon polyp    • Diabetes mellitus ([facility] [address] )    • Diabetes mellitus due to underlying condition with hyperglycemia, without long-term current use of insulin ([facility] [address] ) 2020   • Elevated blood pressure reading     last assessed 13   • GERD (gastroesophageal reflux disease)    • Hematuria     last assessed 14   • Hypercalcemia     last assessed 14   • Hyperlipidemia    • Kidney stone    • Leukocytosis     last assessed    • Microalbuminuria     last assessed 14   • Paresthesia 2017   • Reactive airway disease    • Restrictive lung disease    • Skin rash 2017   • Snoring 2013   • Type 2 diabetes mellitus with hyperglycemia ([address] ) 2013   • Type 2 diabetes mellitus without complication, without long-term current use of insulin ([facility] [address] ) 2020   • Uncontrolled type 2 diabetes mellitus with hyperglycemia ([facility] [address] ) 2021   • UTI (urinary tract infection) 2017         Surgical History     Past Surgical History:   Procedure Laterality Date   •  SECTION     • FL RETROGRADE PYELOGRAM  3/7/2022   • FL RETROGRADE PYELOGRAM  2022   • HYSTERECTOMY      age 37 per MRS   • IR NEPHROSTOMY TUBE PLACEMENT  2022   • IR NEPHROURETERAL ACCESS FOR UROLOGY PCNL  3/1/2022   • OOPHORECTOMY Bilateral     age 37 per MRS, with hysterectomy   • AR PERCUT REMV KID STONE,2+ CM Left 3/7/2022    Procedure: NEPHROLITHOTOMY  PERCUTANEOUS (PCNL); Surgeon: Judah Davis MD;  Location: [facility];  Service: Urology   • AR PERCUT [address] TO 2 CM Left 2022    Procedure: NEPHROLITHOTOMY  PERCUTANEOUS (PCNL);   Surgeon: Judah Davis MD;  Location: [facility];  Service: Urology   • TOTAL ABDOMINAL HYSTERECTOMY W/ BILATERAL SALPINGOOPHORECTOMY           Family History     Family History   Problem Relation Age of Onset   • Parkinsonism Mother    • Spinal muscular atrophy Mother    • Diabetes Father borderline   • Melanoma Father    • No Known Problems Sister    • No Known Problems Daughter    • No Known Problems Maternal Grandmother    • No Known Problems Maternal Grandfather    • No Known Problems Paternal Grandmother    • No Known Problems Paternal Grandfather    • No Known Problems Maternal Aunt    • No Known Problems Paternal Aunt          Social History     Social History       Radiology

## 2022-08-15 ENCOUNTER — CLINICAL SUPPORT (OUTPATIENT)
Dept: INTERNAL MEDICINE CLINIC | Facility: CLINIC | Age: 57
End: 2022-08-15

## 2022-08-15 VITALS — WEIGHT: 170.2 LBS | BODY MASS INDEX: 29.21 KG/M2

## 2022-08-15 DIAGNOSIS — F32.2 CURRENT SEVERE EPISODE OF MAJOR DEPRESSIVE DISORDER WITHOUT PSYCHOTIC FEATURES, UNSPECIFIED WHETHER RECURRENT (HCC): ICD-10-CM

## 2022-08-15 DIAGNOSIS — E11.9 TYPE 2 DIABETES MELLITUS WITHOUT COMPLICATION, WITHOUT LONG-TERM CURRENT USE OF INSULIN (HCC): ICD-10-CM

## 2022-08-15 DIAGNOSIS — F90.0 ATTENTION DEFICIT HYPERACTIVITY DISORDER (ADHD), PREDOMINANTLY INATTENTIVE TYPE: ICD-10-CM

## 2022-08-15 DIAGNOSIS — E11.9 TYPE 2 DIABETES MELLITUS WITHOUT COMPLICATION, WITHOUT LONG-TERM CURRENT USE OF INSULIN (HCC): Primary | ICD-10-CM

## 2022-08-15 RX ORDER — BUPROPION HYDROCHLORIDE 150 MG/1
TABLET ORAL
Qty: 30 TABLET | Refills: 2 | Status: SHIPPED | OUTPATIENT
Start: 2022-08-15 | End: 2022-08-22 | Stop reason: SDUPTHER

## 2022-08-15 NOTE — PROGRESS NOTES
[address] [place] [address], [facility]    Current Diabetes Regimen:  Trulicity  Jardiance  Lantus  Metformin     ASSESSMENT/PLAN                                                                                     Type 2 Diabetes: goal A1c <6 5% based on AACE/ACE guidelines  Most recent A1c below goal     Reported SMBG avg to goal; no hypoglycemia, unaware how to treat  Would benefit from continued Trulicity titration to reduce insulin requirements, has been losing weight  BMP acceptable; b12 above goal       Duration: < 10 years (diagnosis 2013)  Microvascular complications: microalbuminuria  Macrovascular complications: none  (Yes ) Aspirin   (Yes ) Statin   (Yes ) ACEI/ARB  Eye Exam:    Lab Results   Component Value Date    LEFTDIABRET None 03/23/2022    RIGHTDIABRET None 03/23/2022   Foot Exam: 2/2022    Most recent labs and diabetes goals discussed   Medications: If PCP is in agreeance, will have patient increase Trulicity to 3 mg weekly; continue Jardiance and metformin; hold Lantus, resume at 10 units once daily if fbg readings consecutively > 200  Orders placed under CPA  Home Monitoring: once daily  Lifestyle Modifications:   BMI Counseling: Body mass index is 29 21 kg/m²  The BMI is above normal  Nutrition recommendations include reducing portion sizes  Pharmacotherapy was ordered for patient to aid in weight loss  Reviewed how to treat hypoglycemia episodes    Bupropion: will alert PCP to patient request    Follow-Up: 1 month      SUBJECTIVE                                                                                                            Medication Adherence: Medication list reviewed with patient, reports the following discrepancies/problems:  Albuterol: has not needed in a while  BuPROPion: feels it is not really helping with ADHD signs/symptoms, no change in focus; inquire about alternative medication   Had to cancel PCP appointment in 7/2022 and had difficulty rescheduling due to limited PCP availability  Catarino Rousseau Sopn: 15 units once daily, did not take last night as she used up pen and did not want to start - has 1 or 2 pens remaining   [facility] takes on Wednesdays, 2 pens remaining of current supply  2  Medication Efficacy:    Review of Systems   Constitutional: Positive for appetite change  Negative for unexpected weight change  Gastrointestinal: Positive for nausea (when eating larger portions)  Negative for constipation, diarrhea and vomiting  Neurological: Negative for dizziness and light-headedness  Psychiatric/Behavioral: Positive for decreased concentration  The patient is hyperactive  No change with bupropion     Home blood sugar readings (no log, per memory): checks daily  FBG today was 131 but avg 110-130    Home Monitoring:  Glucometer: Yes, Brand: OneTouch Ultra  CGM: No, Brand: NA    Hypoglycemia history: 0 readings < 70mg/dL since last appt   Has glucose tablets: No   Knows how to treat: No   Unsymptomatic hypoglycemia: No, reports hypoglycemia signs/symptoms 0 since last appt    3  Lifestyle:  eats small meal every 3-4 hours, has noticed portion size decrease since increasing Trulicity         Social History     Tobacco Use   Smoking Status Never Smoker   Smokeless Tobacco Never Used     Social History     Substance and Sexual Activity   Alcohol Use Yes    Comment: social          OBJECTIVE                                                                                                      Vitals:    08/15/22 1110   Weight: 77 2 kg (170 lb 3 2 oz)       Pertinent Lab Data:     Lab Results   Component Value Date    SODIUM 140 06/06/2022    K 4 6 06/06/2022    EGFR 92 06/06/2022    CREATININE 0 72 06/06/2022    GLUF 103 (H) 06/06/2022    [id number] 1,213 (H) 06/06/2022    MICROALBCRE 206 (H) 06/06/2022       Lab Results   Component Value Date    HGBA1C 6 3 (H) 06/06/2022    HGBA1C 7 1 (H) 02/21/2022    HGBA1C 8 3 (H) 11/08/2021       Pharmacist Tracking Tool  Reason For Outreach: Embedded Pharmacist  Demographics:  Intervention Method:  In Person  Type of Intervention: Follow-Up  Topics Addressed: Diabetes and Obesity  Pharmacologic Interventions: Dose or Frequency Adjusted  Non-Pharmacologic Interventions: Disease state education and Home Monitoring  Time:  Direct Patient Care: 20 mins   Care Coordination: 10 mins  Recommendation Recipient: Patient/Caregiver  Outcome: Accepted

## 2022-08-15 NOTE — PATIENT INSTRUCTIONS
Increase Trulicity to 3 mg weekly (take 2 pens for your dose this week then  new supply from pharmacy)  Continue Jardiance and metformin    Hold Lantus, resume at 10 units once daily if your morning readings are consecutively > 200       Bring in your glucometer to your next appointment

## 2022-08-16 NOTE — PROGRESS NOTES
I will refer her to psychiatry   Did you receive my note re: Trulicity being backordered  Clerical staff- please inform her that I am referring her to the resident psychiatrists that come to our office

## 2022-08-17 ENCOUNTER — TELEPHONE (OUTPATIENT)
Dept: INTERNAL MEDICINE CLINIC | Facility: CLINIC | Age: 57
End: 2022-08-17

## 2022-08-17 RX ORDER — DULAGLUTIDE 3 MG/.5ML
INJECTION, SOLUTION SUBCUTANEOUS
Qty: 2 ML | Refills: 1 | Status: SHIPPED | OUTPATIENT
Start: 2022-08-17 | End: 2022-10-19 | Stop reason: SDUPTHER

## 2022-08-17 NOTE — TELEPHONE ENCOUNTER
I don't see Dr Kiki Tellez mention increase dose in chart  Will continue current dose and confirm with Dr Kiki Tellez when she is back next week

## 2022-08-17 NOTE — TELEPHONE ENCOUNTER
Patient said her pharmacy called her to  bupropion 150 mg but she though this was going to be increased? She already uses bupropion 150 mg  Please advise, call patient to discuss

## 2022-08-17 NOTE — TELEPHONE ENCOUNTER
Contacted pharmacy who stated Trulicity was unorderable through supplier  Confirmed with Trulicity rep that Trulicity is not currently on any sort of backorder  Order sent via cpa   No further action

## 2022-08-22 DIAGNOSIS — F90.0 ATTENTION DEFICIT HYPERACTIVITY DISORDER (ADHD), PREDOMINANTLY INATTENTIVE TYPE: ICD-10-CM

## 2022-08-22 DIAGNOSIS — F32.2 CURRENT SEVERE EPISODE OF MAJOR DEPRESSIVE DISORDER WITHOUT PSYCHOTIC FEATURES, UNSPECIFIED WHETHER RECURRENT (HCC): ICD-10-CM

## 2022-08-22 RX ORDER — BUPROPION HYDROCHLORIDE 300 MG/1
300 TABLET ORAL EVERY MORNING
Qty: 30 TABLET | Refills: 1 | Status: SHIPPED | OUTPATIENT
Start: 2022-08-22 | End: 2022-10-17

## 2022-08-26 ENCOUNTER — TELEPHONE (OUTPATIENT)
Dept: PSYCHIATRY | Facility: CLINIC | Age: 57
End: 2022-08-26

## 2022-08-26 NOTE — TELEPHONE ENCOUNTER
contacted patient in regards to referral and in attempts to add patient to proper waitlist  lvm for patient to contact intake dept

## 2022-08-31 DIAGNOSIS — E11.65 UNCONTROLLED TYPE 2 DIABETES MELLITUS WITH HYPERGLYCEMIA (HCC): ICD-10-CM

## 2022-08-31 RX ORDER — PEN NEEDLE, DIABETIC 32GX 5/32"
NEEDLE, DISPOSABLE MISCELLANEOUS
Qty: 100 EACH | Refills: 3 | Status: SHIPPED | OUTPATIENT
Start: 2022-08-31 | End: 2022-10-19

## 2022-09-01 ENCOUNTER — TELEPHONE (OUTPATIENT)
Dept: PSYCHIATRY | Facility: CLINIC | Age: 57
End: 2022-09-01

## 2022-09-11 DIAGNOSIS — E11.65 UNCONTROLLED TYPE 2 DIABETES MELLITUS WITH HYPERGLYCEMIA (HCC): ICD-10-CM

## 2022-09-11 RX ORDER — LANCETS 33 GAUGE
EACH MISCELLANEOUS
Qty: 300 EACH | Refills: 3 | Status: SHIPPED | OUTPATIENT
Start: 2022-09-11

## 2022-09-17 ENCOUNTER — NURSE TRIAGE (OUTPATIENT)
Dept: OTHER | Facility: OTHER | Age: 57
End: 2022-09-17

## 2022-09-17 ENCOUNTER — TELEMEDICINE (OUTPATIENT)
Dept: INTERNAL MEDICINE CLINIC | Facility: CLINIC | Age: 57
End: 2022-09-17
Payer: MEDICARE

## 2022-09-17 DIAGNOSIS — U07.1 COVID-19 VIRUS INFECTION: Primary | ICD-10-CM

## 2022-09-17 PROCEDURE — 99214 OFFICE O/P EST MOD 30 MIN: CPT | Performed by: INTERNAL MEDICINE

## 2022-09-17 NOTE — TELEPHONE ENCOUNTER
Regarding: COVID Pos / Advice  ----- Message from Flip Martins sent at 9/17/2022 11:49 AM EDT -----  "I just tested positive for COVID and I wanted to know what the protocol is"

## 2022-09-17 NOTE — PATIENT INSTRUCTIONS
Problem List Items Addressed This Visit          Other    COVID-19 virus infection - Primary     Discussed with pt treatment options including oral antiviral Paxlovid  Pt has a bridal shower Friday and doesn't want to take the chance of rebound symptoms with Paxlovid with the need to isolate again  As per CDC guidelines she will isolate for 5 days from onset of symptoms (yesterday being day zero), no fever for 24 hours, and symptoms of COVID improving  She will reach out if developing worsening shortness of breath  All of her questions were addressed  She was instructed to reach out if she has any other concerns

## 2022-09-17 NOTE — TELEPHONE ENCOUNTER
Reason for Disposition   HIGH RISK for severe COVID complications (e g , weak immune system, age > 59 years, obesity with BMI > 22, pregnant, chronic lung disease or other chronic medical condition)  (Exception: Already seen by PCP and no new or worsening symptoms )    Answer Assessment - Initial Assessment Questions  1  COVID-19 DIAGNOSIS: "Who made your COVID-19 diagnosis?" "Was it confirmed by a positive lab test or self-test?" If not diagnosed by a doctor (or NP/PA), ask "Are there lots of cases (community spread) where you live?" Note: See public health department website, if unsure  Home test   2  COVID-19 EXPOSURE: "Was there any known exposure to COVID before the symptoms began?" CDC Definition of close contact: within 6 feet (2 meters) for a total of 15 minutes or more over a 24-hour period  Unsure   3  ONSET: "When did the COVID-19 symptoms start?"       Yesterday  4  WORST SYMPTOM: "What is your worst symptom?" (e g , cough, fever, shortness of breath, muscle aches)      cough  5  COUGH: "Do you have a cough?" If Yes, ask: "How bad is the cough?"        slight  6  FEVER: "Do you have a fever?" If Yes, ask: "What is your temperature, how was it measured, and when did it start?"      No   7  RESPIRATORY STATUS: "Describe your breathing?" (e g , shortness of breath, wheezing, unable to speak)       Normal  8  BETTER-SAME-WORSE: "Are you getting better, staying the same or getting worse compared to yesterday?"  If getting worse, ask, "In what way?"      Worse   9  HIGH RISK DISEASE: "Do you have any chronic medical problems?" (e g , asthma, heart or lung disease, weak immune system, obesity, etc )      Asthma   10  VACCINE: "Have you had the COVID-19 vaccine?" If Yes, ask: "Which one, how many shots, when did you get it?"        Yes   11  BOOSTER: "Have you received your COVID-19 booster?" If Yes, ask: "Which one and when did you get it?"        Yes  12   PREGNANCY: "Is there any chance you are pregnant?" "When was your last menstrual period?"        No  13  OTHER SYMPTOMS: "Do you have any other symptoms?"  (e g , chills, fatigue, headache, loss of smell or taste, muscle pain, sore throat)        headache  14   O2 SATURATION MONITOR:  "Do you use an oxygen saturation monitor (pulse oximeter) at home?" If Yes, ask "What is your reading (oxygen level) today?" "What is your usual oxygen saturation reading?" (e g , 95%)        No    Protocols used: CORONAVIRUS (COVID-19) DIAGNOSED OR SUSPECTED-ADULT-

## 2022-09-17 NOTE — PROGRESS NOTES
COVID-19 Outpatient Progress Note    Assessment/Plan:    Problem List Items Addressed This Visit        Other    COVID-19 virus infection - Primary     Discussed with pt treatment options including oral antiviral Paxlovid  Pt has a bridal shower Friday and doesn't want to take the chance of rebound symptoms with Paxlovid with the need to isolate again  As per CDC guidelines she will isolate for 5 days from onset of symptoms (yesterday being day zero), no fever for 24 hours, and symptoms of COVID improving  She will reach out if developing worsening shortness of breath  All of her questions were addressed  She was instructed to reach out if she has any other concerns  Disposition:     I have spent 20 minutes directly with the patient  Greater than 50% of this time was spent in counseling/coordination of care regarding: prognosis, instructions for management, patient and family education and impressions  Encounter provider: Quinn Hayes MD     Provider located at: [address] [place] [phone]     Recent Visits  No visits were found meeting these conditions  Showing recent visits within past 7 days and meeting all other requirements  Today's Visits  Date Type Provider Dept   09/17/22 Telemedicine Quinn Hayes MD [facility] today's visits and meeting all other requirements  Future Appointments  No visits were found meeting these conditions  Showing future appointments within next 150 days and meeting all other requirements       Patient agrees to participate in a virtual check in via telephone or video visit instead of presenting to the office to address urgent/immediate medical needs  Patient is aware this is a billable service  She acknowledged consent and understanding of privacy and security of the video platform   The patient has agreed to participate and understands they can discontinue the visit at any time  After connecting through [facility], the patient was identified by name and date of birth  Kallie Mortimer was informed that this was a telemedicine visit and that the exam was being conducted confidentially over secure lines  My office door was closed  No one else was in the room  Kallie Mortimer acknowledged consent and understanding of privacy and security of the telemedicine visit  I informed the patient that I have reviewed her record in Epic and presented the opportunity for her to ask any questions regarding the visit today  The patient agreed to participate  Verification of patient location:  Patient is located in the following state in which I hold an active license: [place]    Subjective:   Kallie Mortimer is a 60 y o  female who is concerned about COVID-19  Patient's symptoms include fatigue, malaise, nasal congestion, sore throat, cough, shortness of breath (mild with her asthma), myalgias and headache  Patient denies fever, chills, chest tightness, abdominal pain, nausea, vomiting and diarrhea  - Date of symptom onset: 9/16/2022      COVID-19 vaccination status: Fully vaccinated with booster    No results found for: Hillary Celestin, [address] [facility], [place], [facility], [address] [facility]    Review of Systems   Constitutional: Positive for fatigue  Negative for chills and fever  HENT: Positive for congestion and sore throat  Respiratory: Positive for cough and shortness of breath (mild with her asthma)  Negative for chest tightness  Gastrointestinal: Negative for abdominal pain, diarrhea, nausea and vomiting  Musculoskeletal: Positive for myalgias  Neurological: Positive for headaches       Current Outpatient Medications on File Prior to Visit   Medication Sig    albuterol (ProAir HFA) 90 mcg/act inhaler Inhale 1-2 puffs every 4 (four) hours as needed for wheezing    aspirin 81 MG tablet Take 1 tablet by mouth daily    atorvastatin (LIPITOR) 20 mg tablet Take 1 tablet (20 mg total) by mouth daily    buPROPion (WELLBUTRIN XL) 300 mg 24 hr tablet Take 1 tablet (300 mg total) by mouth every morning    cholecalciferol (VITAMIN D3) 1,000 units tablet Take 1,000 Units by mouth daily    Empagliflozin (Jardiance) 25 MG TABS Take 1 tablet (25 mg total) by mouth in the morning    ferrous sulfate 324 (65 Fe) mg Take 1 tablet (324 mg total) by mouth every other day    glucose blood (OneTouch Ultra) test strip Use 1 each 3 (three) times a day    lisinopril (ZESTRIL) 5 mg tablet Take 1 tablet (5 mg total) by mouth daily    metFORMIN (GLUCOPHAGE-XR) 500 mg 24 hr tablet Take 2 tablets (1,000 mg total) by mouth 2 (two) times a day with meals    montelukast (SINGULAIR) 10 mg tablet Take 1 tablet (10 mg total) by mouth every evening    potassium citrate (UROCIT-K) 10 mEq Take 1 tablet (10 mEq total) by mouth 2 (two) times a day    Trulicity 3 RM/5 4QS SOPN INJECT 0 5 ML (3 MG TOTAL) UNDER THE SKIN ONCE A WEEK FOR DIABETES    BD Pen Needle Caprice 2nd Gen 32G X 4 MM MISC USE DAILY AT BEDTIME    insulin glargine (Lantus SoloStar) 100 units/mL injection pen Inject 15 Units under the skin daily at bedtime    Lancets (OneTouch Delica Plus GAHOFH72C) MISC TEST 3 TIMES A DAY       Objective: There were no vitals taken for this visit  Physical Exam  Constitutional:       General: She is not in acute distress  Appearance: Normal appearance  Pulmonary:      Effort: Pulmonary effort is normal  No respiratory distress  Skin:     Coloration: Skin is not jaundiced or pale  Neurological:      Mental Status: She is alert         Shital Perdomo MD

## 2022-09-17 NOTE — ASSESSMENT & PLAN NOTE
Discussed with pt treatment options including oral antiviral Paxlovid  Pt has a bridal shower Friday and doesn't want to take the chance of rebound symptoms with Paxlovid with the need to isolate again  As per CDC guidelines she will isolate for 5 days from onset of symptoms (yesterday being day zero), no fever for 24 hours, and symptoms of COVID improving  She will reach out if developing worsening shortness of breath  All of her questions were addressed  She was instructed to reach out if she has any other concerns

## 2022-10-14 ENCOUNTER — APPOINTMENT (OUTPATIENT)
Dept: LAB | Facility: HOSPITAL | Age: 57
End: 2022-10-14
Payer: MEDICARE

## 2022-10-14 DIAGNOSIS — I10 BENIGN ESSENTIAL HYPERTENSION: ICD-10-CM

## 2022-10-14 DIAGNOSIS — E11.9 TYPE 2 DIABETES MELLITUS WITHOUT COMPLICATION, WITHOUT LONG-TERM CURRENT USE OF INSULIN (HCC): ICD-10-CM

## 2022-10-14 DIAGNOSIS — E78.2 MIXED HYPERLIPIDEMIA: ICD-10-CM

## 2022-10-14 DIAGNOSIS — D50.8 IRON DEFICIENCY ANEMIA SECONDARY TO INADEQUATE DIETARY IRON INTAKE: ICD-10-CM

## 2022-10-14 LAB
ALBUMIN SERPL BCP-MCNC: 4.5 G/DL (ref 3.5–5)
ALP SERPL-CCNC: 99 U/L (ref 34–104)
ALT SERPL W P-5'-P-CCNC: 35 U/L (ref 7–52)
ANION GAP SERPL CALCULATED.3IONS-SCNC: 11 MMOL/L (ref 4–13)
AST SERPL W P-5'-P-CCNC: 24 U/L (ref 13–39)
BILIRUB SERPL-MCNC: 0.26 MG/DL (ref 0.2–1)
BUN SERPL-MCNC: 24 MG/DL (ref 5–25)
CALCIUM SERPL-MCNC: 9.8 MG/DL (ref 8.4–10.2)
CHLORIDE SERPL-SCNC: 99 MMOL/L (ref 96–108)
CHOLEST SERPL-MCNC: 117 MG/DL
CO2 SERPL-SCNC: 27 MMOL/L (ref 21–32)
CREAT SERPL-MCNC: 0.89 MG/DL (ref 0.6–1.3)
CREAT UR-MCNC: 75.1 MG/DL
ERYTHROCYTE [DISTWIDTH] IN BLOOD BY AUTOMATED COUNT: 18.1 % (ref 11.6–15.1)
EST. AVERAGE GLUCOSE BLD GHB EST-MCNC: 134 MG/DL
FERRITIN SERPL-MCNC: 6 NG/ML (ref 8–388)
GFR SERPL CREATININE-BSD FRML MDRD: 71 ML/MIN/1.73SQ M
GLUCOSE P FAST SERPL-MCNC: 115 MG/DL (ref 65–99)
HBA1C MFR BLD: 6.3 %
HCT VFR BLD AUTO: 38.6 % (ref 34.8–46.1)
HDLC SERPL-MCNC: 37 MG/DL
HGB BLD-MCNC: 11.9 G/DL (ref 11.5–15.4)
LDLC SERPL CALC-MCNC: 18 MG/DL (ref 0–100)
MCH RBC QN AUTO: 24 PG (ref 26.8–34.3)
MCHC RBC AUTO-ENTMCNC: 30.8 G/DL (ref 31.4–37.4)
MCV RBC AUTO: 78 FL (ref 82–98)
MICROALBUMIN UR-MCNC: 44.3 MG/L (ref 0–20)
MICROALBUMIN/CREAT 24H UR: 59 MG/G CREATININE (ref 0–30)
PLATELET # BLD AUTO: 360 THOUSANDS/UL (ref 149–390)
PMV BLD AUTO: 8.9 FL (ref 8.9–12.7)
POTASSIUM SERPL-SCNC: 4.3 MMOL/L (ref 3.5–5.3)
PROT SERPL-MCNC: 7.5 G/DL (ref 6.4–8.4)
RBC # BLD AUTO: 4.95 MILLION/UL (ref 3.81–5.12)
SODIUM SERPL-SCNC: 137 MMOL/L (ref 135–147)
TRIGL SERPL-MCNC: 309 MG/DL
TSH SERPL DL<=0.05 MIU/L-ACNC: 1.87 UIU/ML (ref 0.45–4.5)
WBC # BLD AUTO: 9.11 THOUSAND/UL (ref 4.31–10.16)

## 2022-10-14 PROCEDURE — 82570 ASSAY OF URINE CREATININE: CPT

## 2022-10-14 PROCEDURE — 85027 COMPLETE CBC AUTOMATED: CPT

## 2022-10-14 PROCEDURE — 36415 COLL VENOUS BLD VENIPUNCTURE: CPT

## 2022-10-14 PROCEDURE — 84443 ASSAY THYROID STIM HORMONE: CPT

## 2022-10-14 PROCEDURE — 82043 UR ALBUMIN QUANTITATIVE: CPT

## 2022-10-14 PROCEDURE — 82728 ASSAY OF FERRITIN: CPT

## 2022-10-14 PROCEDURE — 80061 LIPID PANEL: CPT

## 2022-10-14 PROCEDURE — 80053 COMPREHEN METABOLIC PANEL: CPT

## 2022-10-14 PROCEDURE — 83036 HEMOGLOBIN GLYCOSYLATED A1C: CPT

## 2022-10-17 DIAGNOSIS — F32.2 CURRENT SEVERE EPISODE OF MAJOR DEPRESSIVE DISORDER WITHOUT PSYCHOTIC FEATURES, UNSPECIFIED WHETHER RECURRENT (HCC): ICD-10-CM

## 2022-10-17 DIAGNOSIS — F90.0 ATTENTION DEFICIT HYPERACTIVITY DISORDER (ADHD), PREDOMINANTLY INATTENTIVE TYPE: ICD-10-CM

## 2022-10-17 RX ORDER — BUPROPION HYDROCHLORIDE 300 MG/1
300 TABLET ORAL EVERY MORNING
Qty: 30 TABLET | Refills: 1 | Status: SHIPPED | OUTPATIENT
Start: 2022-10-17

## 2022-10-19 ENCOUNTER — OFFICE VISIT (OUTPATIENT)
Dept: INTERNAL MEDICINE CLINIC | Facility: CLINIC | Age: 57
End: 2022-10-19
Payer: MEDICARE

## 2022-10-19 VITALS
HEIGHT: 64 IN | HEART RATE: 93 BPM | BODY MASS INDEX: 27.96 KG/M2 | OXYGEN SATURATION: 99 % | TEMPERATURE: 97.4 F | WEIGHT: 163.8 LBS | SYSTOLIC BLOOD PRESSURE: 114 MMHG | DIASTOLIC BLOOD PRESSURE: 78 MMHG

## 2022-10-19 DIAGNOSIS — Z23 NEED FOR VACCINATION: ICD-10-CM

## 2022-10-19 DIAGNOSIS — Z00.00 MEDICARE ANNUAL WELLNESS VISIT, SUBSEQUENT: Primary | ICD-10-CM

## 2022-10-19 DIAGNOSIS — N30.00 ACUTE CYSTITIS WITHOUT HEMATURIA: ICD-10-CM

## 2022-10-19 DIAGNOSIS — I10 BENIGN ESSENTIAL HYPERTENSION: ICD-10-CM

## 2022-10-19 DIAGNOSIS — Z12.11 COLON CANCER SCREENING: ICD-10-CM

## 2022-10-19 DIAGNOSIS — E78.2 MIXED HYPERLIPIDEMIA: ICD-10-CM

## 2022-10-19 DIAGNOSIS — Z12.31 ENCOUNTER FOR SCREENING MAMMOGRAM FOR BREAST CANCER: ICD-10-CM

## 2022-10-19 DIAGNOSIS — D50.8 IRON DEFICIENCY ANEMIA SECONDARY TO INADEQUATE DIETARY IRON INTAKE: ICD-10-CM

## 2022-10-19 DIAGNOSIS — E11.9 TYPE 2 DIABETES MELLITUS WITHOUT COMPLICATION, WITHOUT LONG-TERM CURRENT USE OF INSULIN (HCC): ICD-10-CM

## 2022-10-19 LAB
CREAT UR-MCNC: 41.2 MG/DL
MICROALBUMIN UR-MCNC: 56.6 MG/L (ref 0–20)
MICROALBUMIN/CREAT 24H UR: 137 MG/G CREATININE (ref 0–30)
SL AMB  POCT GLUCOSE, UA: ABNORMAL
SL AMB LEUKOCYTE ESTERASE,UA: ABNORMAL
SL AMB POCT BILIRUBIN,UA: ABNORMAL
SL AMB POCT BLOOD,UA: ABNORMAL
SL AMB POCT CLARITY,UA: CLEAR
SL AMB POCT COLOR,UA: ABNORMAL
SL AMB POCT KETONES,UA: ABNORMAL
SL AMB POCT NITRITE,UA: ABNORMAL
SL AMB POCT PH,UA: 6
SL AMB POCT SPECIFIC GRAVITY,UA: 1.02
SL AMB POCT URINE PROTEIN: ABNORMAL
SL AMB POCT UROBILINOGEN: 3.5

## 2022-10-19 PROCEDURE — 87086 URINE CULTURE/COLONY COUNT: CPT | Performed by: INTERNAL MEDICINE

## 2022-10-19 PROCEDURE — 82570 ASSAY OF URINE CREATININE: CPT | Performed by: INTERNAL MEDICINE

## 2022-10-19 PROCEDURE — G0439 PPPS, SUBSEQ VISIT: HCPCS | Performed by: INTERNAL MEDICINE

## 2022-10-19 PROCEDURE — 99214 OFFICE O/P EST MOD 30 MIN: CPT | Performed by: INTERNAL MEDICINE

## 2022-10-19 PROCEDURE — 81002 URINALYSIS NONAUTO W/O SCOPE: CPT | Performed by: INTERNAL MEDICINE

## 2022-10-19 PROCEDURE — 90682 RIV4 VACC RECOMBINANT DNA IM: CPT

## 2022-10-19 PROCEDURE — 82043 UR ALBUMIN QUANTITATIVE: CPT | Performed by: INTERNAL MEDICINE

## 2022-10-19 PROCEDURE — 90471 IMMUNIZATION ADMIN: CPT

## 2022-10-19 RX ORDER — DULAGLUTIDE 3 MG/.5ML
3 INJECTION, SOLUTION SUBCUTANEOUS WEEKLY
Qty: 2 ML | Refills: 5 | Status: SHIPPED | OUTPATIENT
Start: 2022-10-19 | End: 2022-10-20 | Stop reason: DRUGHIGH

## 2022-10-19 RX ORDER — NITROFURANTOIN 25; 75 MG/1; MG/1
100 CAPSULE ORAL 2 TIMES DAILY
Qty: 10 CAPSULE | Refills: 0 | Status: SHIPPED | OUTPATIENT
Start: 2022-10-19 | End: 2022-10-24

## 2022-10-19 NOTE — ASSESSMENT & PLAN NOTE
Spoke to pt and scheduled fasting labs this week.   Unusually low LDL after reduction in dose of atorvastatin  Continue atorvastatin 20mg

## 2022-10-19 NOTE — PATIENT INSTRUCTIONS
Medicare Preventive Visit Patient Instructions  Thank you for completing your Welcome to Medicare Visit or Medicare Annual Wellness Visit today  Your next wellness visit will be due in one year (10/20/2023)  The screening/preventive services that you may require over the next 5-10 years are detailed below  Some tests may not apply to you based off risk factors and/or age  Screening tests ordered at today's visit but not completed yet may show as past due  Also, please note that scanned in results may not display below  Preventive Screenings:  Service Recommendations Previous Testing/Comments   Colorectal Cancer Screening  * Colonoscopy    * Fecal Occult Blood Test (FOBT)/Fecal Immunochemical Test (FIT)  * Fecal DNA/Cologuard Test  * Flexible Sigmoidoscopy Age: 39-70 years old   Colonoscopy: every 10 years (may be performed more frequently if at higher risk)  OR  FOBT/FIT: every 1 year  OR  Cologuard: every 3 years  OR  Sigmoidoscopy: every 5 years  Screening may be recommended earlier than age 39 if at higher risk for colorectal cancer  Also, an individualized decision between you and your healthcare provider will decide whether screening between the ages of 74-80 would be appropriate  Colonoscopy: 02/13/2019  FOBT/FIT: Not on file  Cologuard: Not on file  Sigmoidoscopy: Not on file          Breast Cancer Screening Age: 36 years old  Frequency: every 1-2 years  Not required if history of left and right mastectomy Mammogram: 12/22/2021        Cervical Cancer Screening Between the ages of 21-29, pap smear recommended once every 3 years  Between the ages of 33-67, can perform pap smear with HPV co-testing every 5 years     Recommendations may differ for women with a history of total hysterectomy, cervical cancer, or abnormal pap smears in past  Pap Smear: Not on file        Hepatitis C Screening Once for adults born between [facility]  More frequently in patients at high risk for Hepatitis C Hep C Antibody: 08/04/2017        Diabetes Screening 1-2 times per year if you're at risk for diabetes or have pre-diabetes Fasting glucose: 115 mg/dL (10/14/2022)  A1C: 6 3 % (10/14/2022)      Cholesterol Screening Once every 5 years if you don't have a lipid disorder  May order more often based on risk factors  Lipid panel: 10/14/2022          Other Preventive Screenings Covered by Medicare:  1  Abdominal Aortic Aneurysm (AAA) Screening: covered once if your at risk  You're considered to be at risk if you have a family history of AAA  2  Lung Cancer Screening: covers low dose CT scan once per year if you meet all of the following conditions: (1) Age 50-69; (2) No signs or symptoms of lung cancer; (3) Current smoker or have quit smoking within the last 15 years; (4) You have a tobacco smoking history of at least 20 pack years (packs per day multiplied by number of years you smoked); (5) You get a written order from a healthcare provider  3  Glaucoma Screening: covered annually if you're considered high risk: (1) You have diabetes OR (2) Family history of glaucoma OR (3)  aged 48 and older OR (3)  American aged 72 and older  3  Osteoporosis Screening: covered every 2 years if you meet one of the following conditions: (1) You're estrogen deficient and at risk for osteoporosis based off medical history and other findings; (2) Have a vertebral abnormality; (3) On glucocorticoid therapy for more than 3 months; (4) Have primary hyperparathyroidism; (5) On osteoporosis medications and need to assess response to drug therapy  · Last bone density test (DXA Scan): Not on file  5  HIV Screening: covered annually if you're between the age of 12-76  Also covered annually if you are younger than 13 and older than 72 with risk factors for HIV infection  For pregnant patients, it is covered up to 3 times per pregnancy      Immunizations:  Immunization Recommendations   Influenza Vaccine Annual influenza vaccination during flu season is recommended for all persons aged >= 6 months who do not have contraindications   Pneumococcal Vaccine   * Pneumococcal conjugate vaccine = PCV13 (Prevnar 13), PCV15 (Vaxneuvance), PCV20 (Prevnar 20)  * Pneumococcal polysaccharide vaccine = PPSV23 (Pneumovax) Adults 25-60 years old: 1-3 doses may be recommended based on certain risk factors  Adults 72 years old: 1-2 doses may be recommended based off what pneumonia vaccine you previously received   Hepatitis B Vaccine 3 dose series if at intermediate or high risk (ex: diabetes, end stage renal disease, liver disease)   Tetanus (Td) Vaccine - COST NOT COVERED BY MEDICARE PART B Following completion of primary series, a booster dose should be given every 10 years to maintain immunity against tetanus  Td may also be given as tetanus wound prophylaxis  Tdap Vaccine - COST NOT COVERED BY MEDICARE PART B Recommended at least once for all adults  For pregnant patients, recommended with each pregnancy  Shingles Vaccine (Shingrix) - COST NOT COVERED BY MEDICARE PART B  2 shot series recommended in those aged 48 and above     Health Maintenance Due:      Topic Date Due   • HIV Screening  Never done   • Colorectal Cancer Screening  02/13/2022   • Breast Cancer Screening: Mammogram  12/22/2022   • Hepatitis C Screening  Completed     Immunizations Due:      Topic Date Due   • COVID-19 Vaccine (4 - Booster for Moderna series) 03/01/2022   • Influenza Vaccine (1) 09/01/2022     Advance Directives   What are advance directives? Advance directives are legal documents that state your wishes and plans for medical care  These plans are made ahead of time in case you lose your ability to make decisions for yourself  Advance directives can apply to any medical decision, such as the treatments you want, and if you want to donate organs  What are the types of advance directives?   There are many types of advance directives, and each state has rules about how to use them  You may choose a combination of any of the following:  · Living will: This is a written record of the treatment you want  You can also choose which treatments you do not want, which to limit, and which to stop at a certain time  This includes surgery, medicine, IV fluid, and tube feedings  · Durable power of  for healthcare [facility]): This is a written record that states who you want to make healthcare choices for you when you are unable to make them for yourself  This person, called a proxy, is usually a family member or a friend  You may choose more than 1 proxy  · Do not resuscitate (DNR) order:  A DNR order is used in case your heart stops beating or you stop breathing  It is a request not to have certain forms of treatment, such as CPR  A DNR order may be included in other types of advance directives  · Medical directive: This covers the care that you want if you are in a coma, near death, or unable to make decisions for yourself  You can list the treatments you want for each condition  Treatment may include pain medicine, surgery, blood transfusions, dialysis, IV or tube feedings, and a ventilator (breathing machine)  · Values history: This document has questions about your views, beliefs, and how you feel and think about life  This information can help others choose the care that you would choose  Why are advance directives important? An advance directive helps you control your care  Although spoken wishes may be used, it is better to have your wishes written down  Spoken wishes can be misunderstood, or not followed  Treatments may be given even if you do not want them  An advance directive may make it easier for your family to make difficult choices about your care  Weight Management   Why it is important to manage your weight:  Being overweight increases your risk of health conditions such as heart disease, high blood pressure, type 2 diabetes, and certain types of cancer   It can also increase your risk for osteoarthritis, sleep apnea, and other respiratory problems  Aim for a slow, steady weight loss  Even a small amount of weight loss can lower your risk of health problems  How to lose weight safely:  A safe and healthy way to lose weight is to eat fewer calories and get regular exercise  You can lose up about 1 pound a week by decreasing the number of calories you eat by 500 calories each day  Healthy meal plan for weight management:  A healthy meal plan includes a variety of foods, contains fewer calories, and helps you stay healthy  A healthy meal plan includes the following:  · Eat whole-grain foods more often  A healthy meal plan should contain fiber  Fiber is the part of grains, fruits, and vegetables that is not broken down by your body  Whole-grain foods are healthy and provide extra fiber in your diet  Some examples of whole-grain foods are whole-wheat breads and pastas, oatmeal, brown rice, and bulgur  · Eat a variety of vegetables every day  Include dark, leafy greens such as spinach, kale, evan greens, and mustard greens  Eat yellow and orange vegetables such as carrots, sweet potatoes, and winter squash  · Eat a variety of fruits every day  Choose fresh or canned fruit (canned in its own juice or light syrup) instead of juice  Fruit juice has very little or no fiber  · Eat low-fat dairy foods  Drink fat-free (skim) milk or 1% milk  Eat fat-free yogurt and low-fat cottage cheese  Try low-fat cheeses such as mozzarella and other reduced-fat cheeses  · Choose meat and other protein foods that are low in fat  Choose beans or other legumes such as split peas or lentils  Choose fish, skinless poultry (chicken or turkey), or lean cuts of red meat (beef or pork)  Before you cook meat or poultry, cut off any visible fat  · Use less fat and oil  Try baking foods instead of frying them   Add less fat, such as margarine, sour cream, regular salad dressing and mayonnaise to foods  Eat fewer high-fat foods  Some examples of high-fat foods include french fries, doughnuts, ice cream, and cakes  · Eat fewer sweets  Limit foods and drinks that are high in sugar  This includes candy, cookies, regular soda, and sweetened drinks  Exercise:  Exercise at least 30 minutes per day on most days of the week  Some examples of exercise include walking, biking, dancing, and swimming  You can also fit in more physical activity by taking the stairs instead of the elevator or parking farther away from stores  Ask your healthcare provider about the best exercise plan for you  © Copyright [employer] 2018 Information is for End User's use only and may not be sold, redistributed or otherwise used for commercial purposes   All illustrations and images included in CareNotes® are the copyrighted property of A D A M , Inc  or [address] [place] [address]

## 2022-10-19 NOTE — ASSESSMENT & PLAN NOTE
Controlled on Trulicity and metformin  She is on a low dose ACE inh and statin  Lab Results   Component Value Date    HGBA1C 6 3 (H) 10/14/2022

## 2022-10-19 NOTE — LETTER
2022    Reina Deleon ( 1965) is under my professional care  She is diabetic and is on life sustaining medications to control this  Diabetes if uncontrolled can cause multiple complications       Sincerely,     Ryan French MD

## 2022-10-19 NOTE — PROGRESS NOTES
Assessment and Plan:     Problem List Items Addressed This Visit        Endocrine    Type 2 diabetes mellitus without complication, without long-term current use of insulin (HCC)     Controlled on Trulicity and metformin  She is on a low dose ACE inh and statin  Lab Results   Component Value Date    HGBA1C 6 3 (H) 10/14/2022            Relevant Medications    Dulaglutide (Trulicity) 3 OB/5 4FG SOPN    Other Relevant Orders    CBC and Platelet    Comprehensive metabolic panel    HEMOGLOBIN A1C W/ EAG ESTIMATION    Lipid Panel with Direct LDL reflex    Microalbumin / creatinine urine ratio       Cardiovascular and Mediastinum    Benign essential hypertension     Continue losinopril         Relevant Orders    CBC and Platelet    Comprehensive metabolic panel       Other    Hyperlipidemia     Unusually low LDL after reduction in dose of atorvastatin  Continue atorvastatin 20mg           Relevant Orders    Comprehensive metabolic panel    Lipid Panel with Direct LDL reflex    Anemia     Hgb normal now with low iron but cannot tolerate oral iron  Continue to observe         Relevant Orders    CBC and Platelet    Ferritin      Other Visit Diagnoses     Medicare annual wellness visit, subsequent    -  Primary    Encounter for screening mammogram for breast cancer        Relevant Orders    Mammo screening bilateral w 3d & cad    Colon cancer screening        Relevant Orders    Ambulatory referral for colonoscopy    Acute cystitis without hematuria        Relevant Medications    nitrofurantoin (MACROBID) 100 mg capsule    Other Relevant Orders    POCT urine dip (Completed)    Urine culture    Need for vaccination        Relevant Orders    influenza vaccine, quadrivalent, recombinant, PF, 0 5 mL, for patients 18 yr+ (FLUBLOK) (Completed)           Preventive health issues were discussed with patient, and age appropriate screening tests were ordered as noted in patient's After Visit Summary    Personalized health advice and appropriate referrals for health education or preventive services given if needed, as noted in patient's After Visit Summary  History of Present Illness:     Patient presents for a Medicare Wellness Visit    Had staged approach to remove left staghorn calculus in March and April  She has had difficulty urinating since that she needs to strain  PVR in May 17ml  US in June showed  "Mild fullness of left renal collecting system and extrarenal pelvis no greater than minimal left hydronephrosis    Left renal scarring noted  Echogenic shadowing structure at the lower pole the left kidney measuring up to 5 mm could represent nonobstructing stone fragment, parenchymal calcification, or milk of calcium within a small cyst    Small left renal cortical cysts noted largest measuring up to 13 mm  "  In the past week she has been experiencing dysuria and bladder pressure  Recent labs reviewed and A1C stable at 6 3, lipids -very low LDL 18 and higher   Atorvastatin lowered to 20mg at last visit due to myalgias  No anemia but ferritin remains low at 6  She was taking oral iron every other day but stopped due to nausea  Ongoing asthma flare and using her albuterol  Flovent regularly       Patient Care Team:  Nigel Lynn MD as PCP - Meghan Ellis MD as PCP - [address] [place] [address] (RTE)  Nigel Lynn MD as PCP - [facility] (RTE)  Katie Smith MD     Review of Systems:     Review of Systems   Constitutional: Positive for unexpected weight change (weight loss)  Negative for chills and fever  Respiratory: Positive for wheezing (onging asthma flare with fall allergies and using albuterol and Flovent)  Negative for shortness of breath  Cardiovascular: Negative for chest pain and palpitations  Gastrointestinal: Negative for abdominal pain, constipation and diarrhea     Genitourinary: Positive for difficulty urinating (since surgery for staghorn calculus on the left), dysuria (for a week), frequency and pelvic pain  Negative for hematuria  Neurological: Positive for headaches  Negative for dizziness          Problem List:     Patient Active Problem List   Diagnosis   • Benign essential hypertension   • Hyperlipidemia   • Type 2 diabetes mellitus without complication, without long-term current use of insulin ([facility])   • Kidney stone   • Mild persistent asthma without complication   • Allergic rhinitis   • Anemia   • Staghorn renal calculus   • Incomplete emptying of bladder   • Restrictive airway disease   • Uric acid nephrolithiasis   • Elevated erythrocyte sedimentation rate   • Hematuria, microscopic   • Hot flashes due to menopause   • Irritable   • Irritable bowel syndrome   • Moderate obstructive sleep apnea   • Obesity   • Urge and stress incontinence   • Current severe episode of major depressive disorder without psychotic features ([place] [address] )   • Attention deficit hyperactivity disorder (ADHD), predominantly inattentive type   • COVID-19 virus infection      Past Medical and Surgical History:     Past Medical History:   Diagnosis Date   • Abnormal LFTs (liver function tests)     last assessed 09/11/14   • Anemia     last assessed 07/28/14   • Anesthesia complication     Restrictive airway diesease- has asthma attack when extubated   • Asthma    • Asthma 6/17/2013   • Colon polyp    • Diabetes mellitus ([place] [address] )    • Diabetes mellitus due to underlying condition with hyperglycemia, without long-term current use of insulin ([place] [address] ) 9/11/2020   • Elevated blood pressure reading     last assessed 06/17/13   • GERD (gastroesophageal reflux disease)    • Hematuria     last assessed 09/11/14   • Hypercalcemia     last assessed 09/11/14   • Hyperlipidemia    • Kidney stone    • Leukocytosis     last assessed 09/11/124   • Microalbuminuria     last assessed 09/11/14   • Paresthesia 9/21/2017   • Reactive airway disease    • Restrictive lung disease    • Skin rash 9/21/2017   • Snoring 6/17/2013   • Type 2 diabetes mellitus with hyperglycemia ([facility] ) 2013   • Type 2 diabetes mellitus without complication, without long-term current use of insulin ([facility] ) 2020   • Uncontrolled type 2 diabetes mellitus with hyperglycemia ([facility] ) 2021   • UTI (urinary tract infection) 2017     Past Surgical History:   Procedure Laterality Date   •  SECTION     • FL RETROGRADE PYELOGRAM  3/7/2022   • FL RETROGRADE PYELOGRAM  2022   • HYSTERECTOMY      age 37 per MRS   • IR NEPHROSTOMY TUBE PLACEMENT  2022   • IR NEPHROURETERAL ACCESS FOR UROLOGY PCNL  3/1/2022   • OOPHORECTOMY Bilateral     age 37 per MRS, with hysterectomy   • OR PERCUT REMV KID STONE,2+ CM Left 3/7/2022    Procedure: NEPHROLITHOTOMY  PERCUTANEOUS (PCNL); Surgeon: Uziel Jimenez MD;  [facility];  Service: Urology   • OR PERCUT [address] TO 2 CM Left 2022    Procedure: NEPHROLITHOTOMY  PERCUTANEOUS (PCNL);   Surgeon: Uziel Jimenez MD;  [facility];  Service: Urology   • TOTAL ABDOMINAL HYSTERECTOMY W/ BILATERAL SALPINGOOPHORECTOMY        Family History:     Family History   Problem Relation Age of Onset   • Parkinsonism Mother    • Spinal muscular atrophy Mother    • Diabetes Father         borderline   • Melanoma Father    • No Known Problems Sister    • No Known Problems Daughter    • No Known Problems Maternal Grandmother    • No Known Problems Maternal Grandfather    • No Known Problems Paternal Grandmother    • No Known Problems Paternal Grandfather    • No Known Problems Maternal Aunt    • No Known Problems Paternal Aunt       Social History:     Social History     Socioeconomic History   • Marital status: /Civil Union     Spouse name: None   • Number of children: None   • Years of education: None   • Highest education level: None   Occupational History   • None   Tobacco Use   • Smoking status: Never Smoker   • Smokeless tobacco: Never Used   Vaping Use   • Vaping Use: Never used   Substance and Sexual Activity   • Alcohol use: Yes     Comment: social   • Drug use: Never   • Sexual activity: None   Other Topics Concern   • None   Social History Narrative    Exercising regularly     Social Determinants of Health     Financial Resource Strain: Not on file   Food Insecurity: Not on file   Transportation Needs: Not on file   Physical Activity: Not on file   Stress: Not on file   Social Connections: Not on file   Intimate Partner Violence: Not on file   Housing Stability: Not on file      Medications and Allergies:     Current Outpatient Medications   Medication Sig Dispense Refill   • albuterol (ProAir HFA) 90 mcg/act inhaler Inhale 1-2 puffs every 4 (four) hours as needed for wheezing 18 g 3   • aspirin 81 MG tablet Take 1 tablet by mouth daily     • atorvastatin (LIPITOR) 20 mg tablet Take 1 tablet (20 mg total) by mouth daily 90 tablet 1   • buPROPion (WELLBUTRIN XL) 300 mg 24 hr tablet TAKE 1 TABLET (300 MG TOTAL) BY MOUTH EVERY MORNING   30 tablet 1   • cholecalciferol (VITAMIN D3) 1,000 units tablet Take 1,000 Units by mouth daily     • Dulaglutide (Trulicity) 3 UI/0 1RE SOPN Inject 0 5 mL (3 mg total) under the skin once a week 2 mL 5   • Empagliflozin (Jardiance) 25 MG TABS Take 1 tablet (25 mg total) by mouth in the morning 90 tablet 3   • glucose blood (OneTouch Ultra) test strip Use 1 each 3 (three) times a day 300 each 3   • Lancets (OneTouch Delica Plus [id number]) MISC TEST 3 TIMES A  each 3   • lisinopril (ZESTRIL) 5 mg tablet Take 1 tablet (5 mg total) by mouth daily 90 tablet 3   • metFORMIN (GLUCOPHAGE-XR) 500 mg 24 hr tablet Take 2 tablets (1,000 mg total) by mouth 2 (two) times a day with meals 360 tablet 3   • montelukast (SINGULAIR) 10 mg tablet Take 1 tablet (10 mg total) by mouth every evening 90 tablet 3   • nitrofurantoin (MACROBID) 100 mg capsule Take 1 capsule (100 mg total) by mouth 2 (two) times a day for 5 days 10 capsule 0   • potassium citrate (UROCIT-K) 10 mEq Take 1 tablet (10 mEq total) by mouth 2 (two) times a day 120 tablet 6     No current facility-administered medications for this visit  Allergies   Allergen Reactions   • Seasonal Ic  [Cholestatin] Allergic Rhinitis      Immunizations:     Immunization History   Administered Date(s) Administered   • COVID-19 MODERNA VACC 0 5 ML IM 03/11/2021, 04/08/2021, 11/01/2021   • INFLUENZA 10/13/2016, 11/09/2017   • Influenza Quadrivalent Preservative Free 3 years and older IM 10/21/2015, 10/13/2016, 11/09/2017   • Influenza, recombinant, quadrivalent,injectable, preservative free 10/31/2018, 09/11/2020, 09/30/2021, 10/19/2022   • Influenza, seasonal, injectable 09/11/2014   • Pneumococcal Conjugate Vaccine 20-valent (Pcv20), Polysace 06/09/2022   • Tdap 07/28/2014   • Zoster Vaccine Recombinant 05/09/2019, 07/19/2019      Health Maintenance:         Topic Date Due   • HIV Screening  Never done   • Colorectal Cancer Screening  02/13/2022   • Breast Cancer Screening: Mammogram  12/22/2022   • Hepatitis C Screening  Completed         Topic Date Due   • COVID-19 Vaccine (4 - Booster for Climmie Ruy series) 03/01/2022      Medicare Screening Tests and Risk Assessments:     Sandra Thornton is here for her Subsequent Wellness visit  Health Risk Assessment:   Patient rates overall health as very good  Patient feels that their physical health rating is much better  Patient is very satisfied with their life  Eyesight was rated as slightly worse  Hearing was rated as slightly worse  Patient feels that their emotional and mental health rating is much better  Patients states they are never, rarely angry  Patient states they are sometimes unusually tired/fatigued  Pain experienced in the last 7 days has been none  Patient states that she has experienced no weight loss or gain in last 6 months  Fall Risk Screening:    In the past year, patient has experienced: no history of falling in past year      Urinary Incontinence Screening:   Patient has not leaked urine accidently in the last six months  Home Safety:  Patient does not have trouble with stairs inside or outside of their home  Patient has working smoke alarms and has working carbon monoxide detector  Home safety hazards include: none  Nutrition:   Current diet is Diabetic and Limited junk food  Medications:   Patient is currently taking over-the-counter supplements  OTC medications include: see medication list  Patient is able to manage medications  Activities of Daily Living (ADLs)/Instrumental Activities of Daily Living (IADLs):   Walk and transfer into and out of bed and chair?: Yes  Dress and groom yourself?: Yes    Bathe or shower yourself?: Yes    Feed yourself?  Yes  Do your laundry/housekeeping?: Yes  Manage your money, pay your bills and track your expenses?: Yes  Make your own meals?: Yes    Do your own shopping?: Yes    Previous Hospitalizations:   Any hospitalizations or ED visits within the last 12 months?: Yes    How many hospitalizations have you had in the last year?: 1-2    Advance Care Planning:   Living will: No      PREVENTIVE SCREENINGS      Cardiovascular Screening:    General: Screening Not Indicated and History Lipid Disorder      Diabetes Screening:     General: Screening Not Indicated and History Diabetes      Colorectal Cancer Screening:     General: Screening Current      Breast Cancer Screening:     General: Screening Current      Cervical Cancer Screening:    General: Screening Not Indicated      Lung Cancer Screening:     General: Screening Not Indicated      Hepatitis C Screening:    General: Screening Current    Screening, Brief Intervention, and Referral to Treatment (SBIRT)    Screening      Single Item Drug Screening:  How often have you used an illegal drug (including marijuana) or a prescription medication for non-medical reasons in the past year? never    Single Item Drug Screen Score: 0  Interpretation: Negative screen for possible drug use disorder    No exam data present     Physical Exam:     /78 (BP Location: Left arm, Patient Position: Sitting, Cuff Size: Adult)   Pulse 93   Temp (!) 97 4 °F (36 3 °C) (Skin)   Ht 5' 4" (1 626 m)   Wt 74 3 kg (163 lb 12 8 oz)   SpO2 99%   BMI 28 12 kg/m²     Physical Exam  Constitutional:       General: She is not in acute distress  Appearance: She is well-developed  She is not ill-appearing, toxic-appearing or diaphoretic  HENT:      Head: Normocephalic and atraumatic  Eyes:      Conjunctiva/sclera: Conjunctivae normal    Cardiovascular:      Rate and Rhythm: Normal rate and regular rhythm  Heart sounds: Normal heart sounds  No murmur heard  Pulmonary:      Effort: Pulmonary effort is normal  No respiratory distress  Breath sounds: Normal breath sounds  No stridor  No wheezing or rales  Abdominal:      General: There is no distension  Palpations: Abdomen is soft  There is no mass  Tenderness: There is no abdominal tenderness  There is no guarding or rebound  Musculoskeletal:      Cervical back: Neck supple  Right lower leg: No edema  Left lower leg: No edema  Neurological:      Mental Status: She is alert and oriented to person, place, and time  Psychiatric:         Mood and Affect: Mood normal          Behavior: Behavior normal          Thought Content:  Thought content normal          Judgment: Judgment normal           Corinne Ferries, MD

## 2022-10-20 ENCOUNTER — CLINICAL SUPPORT (OUTPATIENT)
Dept: INTERNAL MEDICINE CLINIC | Facility: CLINIC | Age: 57
End: 2022-10-20

## 2022-10-20 DIAGNOSIS — E78.2 MIXED HYPERLIPIDEMIA: ICD-10-CM

## 2022-10-20 DIAGNOSIS — E11.9 TYPE 2 DIABETES MELLITUS WITHOUT COMPLICATION, WITHOUT LONG-TERM CURRENT USE OF INSULIN (HCC): Primary | ICD-10-CM

## 2022-10-20 LAB — BACTERIA UR CULT: NORMAL

## 2022-10-20 RX ORDER — ROSUVASTATIN CALCIUM 10 MG/1
10 TABLET, COATED ORAL DAILY
Qty: 90 TABLET | Refills: 3 | Status: SHIPPED | OUTPATIENT
Start: 2022-10-20

## 2022-10-20 NOTE — PROGRESS NOTES
Sure,  we can try rosuvastatin  I could not explain either how her TG albin and her LDL was so much lower  Will you inform her of the change?

## 2022-10-20 NOTE — PROGRESS NOTES
[address] [place] [address], [facility]    Current Diabetes Regimen:  Trulicity  Jardiance  Metformin     ASSESSMENT/PLAN                                                                                     Type 2 Diabetes: goal A1c <6 5% based on AACE/ACE guidelines  Most recent A1c below goal     FBG avg to goal    Has been having weight loss which has been impacting glycemic control   BMP acceptable; b12 above goal       Duration: < 10 years (diagnosis 2013)  Microvascular complications: microalbuminuria  Macrovascular complications: none  (Yes ) Aspirin   (Yes ) Statin   (Yes ) ACEI/ARB  Eye Exam:    Lab Results   Component Value Date    LEFTDIABRET None 03/23/2022    RIGHTDIABRET None 03/23/2022   Foot Exam: 2/2022    Most recent labs and diabetes goals discussed   Medications: If PCP is in agreeance, will have patient continue current regimen  Orders placed under CPA  Home Monitoring: every other day   Lifestyle Modifications: encouraged patient to continue with lifestyle modifications    2  Hyperlipidemia without clinical ASCVD event: 2018 ACC/AHA lipid guidelines recommend at least moderate statin  Patient currently on moderate intensity and tolerating well without side effects; myalgia with higher dose of atorvastatin  Lipid panel/LFTs show elevated TG but LDL acceptable  Medications: May benefit from converting atorvastatin to rosuvastatin to allow for patient to take high intensity statin which would allow for more TG control vs starting fish oil  Will discuss with PCP       Follow-Up: 3-4 months, patient agrees to schedule sooner appointment if needed      SUBJECTIVE                                                                                                            Medication Adherence: Medication list reviewed with patient, reports the following discrepancies/problems:  Atorvastatin: has a few weeks remaining,  Lantus SoloStar Sopn: has not taken since last PharmD appointment   [facility] Sopn: takes on Wednesdays, did not have dose for next week    Was fasting for most recent labs, does not eat a lot of sweets  2  Medication Efficacy:    Review of Systems   Constitutional: Positive for appetite change  Negative for activity change and unexpected weight change  Gastrointestinal: Negative for constipation, diarrhea, nausea and vomiting  Musculoskeletal: Negative for myalgias  Neurological: Negative for dizziness and light-headedness  Home Monitoring:  Glucometer: Yes, Brand: OneTouch Ultra  CGM: No, Brand: NA    3  Lifestyle:  eats small meal every 3-4 hours, has noticed portion size decrease since increasing Trulicity  Had to put dog down but continues to walk 2 miles almost daily         Social History     Tobacco Use   Smoking Status Never Smoker   Smokeless Tobacco Never Used     Social History     Substance and Sexual Activity   Alcohol Use Yes    Comment: social          OBJECTIVE                                                                                                      FBG (10/1-10/20): 131, 115, 127, 137, 134, 130, 134, 129, 137, 140, 123, 129, 144, 118, 129, 143, 129, 129, 139, 124    Pertinent Lab Data:     Lab Results   Component Value Date    SODIUM 137 10/14/2022    K 4 3 10/14/2022    EGFR 71 10/14/2022    CREATININE 0 89 10/14/2022    GLUF 115 (H) 10/14/2022    [id number] 1,213 (H) 06/06/2022    MICROALBCRE 137 (H) 10/19/2022       Lab Results   Component Value Date    HGBA1C 6 3 (H) 10/14/2022    HGBA1C 6 3 (H) 06/06/2022    HGBA1C 7 1 (H) 02/21/2022     Lab Results   Component Value Date    CHOLESTEROL 117 10/14/2022    CHOLESTEROL 161 06/06/2022    CHOLESTEROL 162 02/21/2022     Lab Results   Component Value Date    HDL 37 (L) 10/14/2022    HDL 42 (L) 06/06/2022    HDL 40 (L) 02/21/2022     Lab Results   Component Value Date    TRIG 309 (H) 10/14/2022    TRIG 275 (H) 06/06/2022    TRIG 288 (H) 02/21/2022     Lab Results   Component Value Date    Salvador 121 08/04/2017    Salvador 119 08/18/2016    Salvador 121 08/19/2015         Pharmacist Tracking Tool  Reason For Outreach: Embedded Pharmacist  Demographics:  Intervention Method:  In Person  Type of Intervention: Follow-Up  Topics Addressed: Diabetes and Dyslipidemia  Pharmacologic Interventions: Medication Conversion  Non-Pharmacologic Interventions: Care coordination and Disease state education  Time:  Direct Patient Care: 30 mins  Care Coordination: 15 mins  Recommendation Recipient: Patient/Caregiver  Outcome: Accepted

## 2022-11-21 ENCOUNTER — HOSPITAL ENCOUNTER (OUTPATIENT)
Dept: CT IMAGING | Facility: HOSPITAL | Age: 57
Discharge: HOME | End: 2022-11-21
Attending: UROLOGY

## 2022-11-21 DIAGNOSIS — N20.0 STAGHORN RENAL CALCULUS: ICD-10-CM

## 2022-12-05 ENCOUNTER — OFFICE VISIT (OUTPATIENT)
Dept: UROLOGY | Facility: CLINIC | Age: 57
End: 2022-12-05

## 2022-12-05 VITALS
OXYGEN SATURATION: 100 % | DIASTOLIC BLOOD PRESSURE: 78 MMHG | BODY MASS INDEX: 27.11 KG/M2 | SYSTOLIC BLOOD PRESSURE: 124 MMHG | HEART RATE: 95 BPM | HEIGHT: 64 IN | WEIGHT: 158.8 LBS

## 2022-12-05 DIAGNOSIS — N20.0 KIDNEY STONE: Primary | ICD-10-CM

## 2022-12-05 LAB
BACTERIA UR QL AUTO: ABNORMAL /HPF
BILIRUB UR QL STRIP: NEGATIVE
BUDDING YEAST: PRESENT
CLARITY UR: ABNORMAL
COLOR UR: ABNORMAL
GLUCOSE UR STRIP-MCNC: ABNORMAL MG/DL
HGB UR QL STRIP.AUTO: NEGATIVE
KETONES UR STRIP-MCNC: NEGATIVE MG/DL
LEUKOCYTE ESTERASE UR QL STRIP: ABNORMAL
NITRITE UR QL STRIP: NEGATIVE
NON-SQ EPI CELLS URNS QL MICRO: ABNORMAL /HPF
PH UR STRIP.AUTO: 5.5 [PH]
PROT UR STRIP-MCNC: ABNORMAL MG/DL
RBC #/AREA URNS AUTO: ABNORMAL /HPF
SL AMB  POCT GLUCOSE, UA: 2000
SL AMB LEUKOCYTE ESTERASE,UA: NORMAL
SL AMB POCT BILIRUBIN,UA: NORMAL
SL AMB POCT BLOOD,UA: NORMAL
SL AMB POCT CLARITY,UA: CLEAR
SL AMB POCT COLOR,UA: YELLOW
SL AMB POCT KETONES,UA: NORMAL
SL AMB POCT NITRITE,UA: NORMAL
SL AMB POCT PH,UA: 5
SL AMB POCT SPECIFIC GRAVITY,UA: 1.02
SL AMB POCT URINE PROTEIN: NORMAL
SL AMB POCT UROBILINOGEN: 0.2
SP GR UR STRIP.AUTO: 1.03 (ref 1–1.03)
UROBILINOGEN UR STRIP-ACNC: <2 MG/DL
WBC #/AREA URNS AUTO: ABNORMAL /HPF
WBC CLUMPS # UR AUTO: PRESENT /UL

## 2022-12-05 NOTE — LETTER
December 5, 2022     Graham Khan, Pharmacist  [address] [facility] [address]    Patient: Vale Diamond   YOB: 1965   Date of Visit: 12/5/2022       Dear Dr Chris Santos: Thank you for referring Mirianalice John to me for evaluation  Below are my notes for this consultation  If you have questions, please do not hesitate to call me  I look forward to following your patient along with you  Sincerely,        [facility] [place], [place]        CC: No Recipients  [place], [place]  12/5/2022  1:31 PM  Incomplete  1  Kidney stone  POCT urine dip    XR abdomen 1 view kub    Urine culture    Urinalysis with microscopic          Assessment and plan:       1  Kidney stones  - s/p left PCNL 2022  - no residual stones  - slight dilation of left collecting system expected given chronic dilation from the previous stone  - asymptomatic  - proper hydration and dietary modifications  - continue potassium citrate  - f/u 1 year KUB prior    2  LUTS  - discussed concern that her SGLT2 inhibitor may be exacerbating her urinary symptoms  She will discuss with PCP/pharmacy about possible alternative therapies  - if ongoing symptoms thereafter may explore urodynamic testing     [place]      Chief Complaint     Kidney stones    History of Present Illness     Vale Diamond is a 60 y o  female patient  with a history of staghorn calculus presenting for follow-up      History of a left staghorn calculus  Elected for left PCNL  Left PCNU placed 3/1/22 with IR  PCNL 3/7/22 followed by repeat PCNL 4/20/22  Underwent stent and nephrostomy tube removal 5/5/22  renal US (6/7/22)  IMPRESSION:     Mild fullness of left renal collecting system and extrarenal pelvis no greater than minimal left hydronephrosis      Left renal scarring noted    Echogenic shadowing structure at the lower pole the left kidney measuring up to 5 mm could represent nonobstructing stone fragment, parenchymal calcification, or milk of calcium within a small cyst      Small left renal cortical cysts noted largest measuring up to 13 mm  Ongoing LUTS of feeling the need to strain with voiding, urgency, frequency  Urine dip small leukocytes, moderate blood, + glucose  Laboratory     Lab Results   Component Value Date    CREATININE 0 89 10/14/2022     Review of Systems     Review of Systems   Constitutional: Negative for activity change, appetite change, chills, diaphoresis, fatigue, fever and unexpected weight change  Respiratory: Negative for chest tightness and shortness of breath  Cardiovascular: Negative for chest pain, palpitations and leg swelling  Gastrointestinal: Negative for abdominal distention, abdominal pain, constipation, diarrhea, nausea and vomiting  Genitourinary: Positive for decreased urine volume, frequency and urgency  Negative for difficulty urinating, dysuria, enuresis, flank pain, genital sores and hematuria  Musculoskeletal: Negative for back pain, gait problem and myalgias  Skin: Negative for color change, pallor, rash and wound  Psychiatric/Behavioral: Negative for behavioral problems  The patient is not nervous/anxious  Allergies     Allergies   Allergen Reactions   • Seasonal Ic  [Cholestatin] Allergic Rhinitis       Physical Exam     Physical Exam  Constitutional:       General: She is not in acute distress  Appearance: Normal appearance  She is normal weight  She is not ill-appearing, toxic-appearing or diaphoretic  HENT:      Head: Normocephalic and atraumatic  Eyes:      General:         Right eye: No discharge  Left eye: No discharge  Conjunctiva/sclera: Conjunctivae normal    Pulmonary:      Effort: Pulmonary effort is normal  No respiratory distress  Musculoskeletal:         General: No swelling or tenderness  Normal range of motion  Skin:     General: Skin is warm and dry        Coloration: Skin is not jaundiced or pale    Neurological:      General: No focal deficit present  Mental Status: She is alert and oriented to person, place, and time  Psychiatric:         Mood and Affect: Mood normal          Behavior: Behavior normal          Thought Content:  Thought content normal            Vital Signs     Vitals:    12/05/22 1054   BP: 124/78   BP Location: Left arm   Patient Position: Sitting   Cuff Size: Adult   Pulse: 95   SpO2: 100%   Weight: 72 kg (158 lb 12 8 oz)   Height: 5' 4" (1 626 m)         Current Medications       Current Outpatient Medications:   •  albuterol (ProAir HFA) 90 mcg/act inhaler, Inhale 1-2 puffs every 4 (four) hours as needed for wheezing, Disp: 18 g, Rfl: 3  •  aspirin 81 MG tablet, Take 1 tablet by mouth daily, Disp: , Rfl:   •  buPROPion (WELLBUTRIN XL) 300 mg 24 hr tablet, TAKE 1 TABLET (300 MG TOTAL) BY MOUTH EVERY MORNING , Disp: 30 tablet, Rfl: 1  •  cholecalciferol (VITAMIN D3) 1,000 units tablet, Take 1,000 Units by mouth daily, Disp: , Rfl:   •  Dulaglutide 3 MG/0 5ML SOPN, Inject 0 5 mL (3 mg total) under the skin once a week For diabetes, Disp: 6 mL, Rfl: 1  •  Empagliflozin (Jardiance) 25 MG TABS, Take 1 tablet (25 mg total) by mouth in the morning, Disp: 90 tablet, Rfl: 3  •  glucose blood (OneTouch Ultra) test strip, Use 1 each 3 (three) times a day, Disp: 300 each, Rfl: 3  •  Lancets (OneTouch Delica Plus [id number]) MISC, TEST 3 TIMES A DAY, Disp: 300 each, Rfl: 3  •  lisinopril (ZESTRIL) 5 mg tablet, Take 1 tablet (5 mg total) by mouth daily, Disp: 90 tablet, Rfl: 3  •  metFORMIN (GLUCOPHAGE-XR) 500 mg 24 hr tablet, Take 2 tablets (1,000 mg total) by mouth 2 (two) times a day with meals, Disp: 360 tablet, Rfl: 3  •  montelukast (SINGULAIR) 10 mg tablet, Take 1 tablet (10 mg total) by mouth every evening, Disp: 90 tablet, Rfl: 3  •  potassium citrate (UROCIT-K) 10 mEq, Take 1 tablet (10 mEq total) by mouth 2 (two) times a day, Disp: 120 tablet, Rfl: 6  •  rosuvastatin (CRESTOR) 10 MG tablet, Take 1 tablet (10 mg total) by mouth daily, Disp: 90 tablet, Rfl: 3      Active Problems     Patient Active Problem List   Diagnosis   • Benign essential hypertension   • Hyperlipidemia   • Type 2 diabetes mellitus without complication, without long-term current use of insulin (HCC)   • Kidney stone   • Mild persistent asthma without complication   • Allergic rhinitis   • Anemia   • Staghorn renal calculus   • Incomplete emptying of bladder   • Restrictive airway disease   • Uric acid nephrolithiasis   • Elevated erythrocyte sedimentation rate   • Hematuria, microscopic   • Hot flashes due to menopause   • Irritable   • Irritable bowel syndrome   • Moderate obstructive sleep apnea   • Obesity   • Urge and stress incontinence   • Current severe episode of major depressive disorder without psychotic features ([facility]   • Attention deficit hyperactivity disorder (ADHD), predominantly inattentive type   • COVID-19 virus infection         Past Medical History     Past Medical History:   Diagnosis Date   • Abnormal LFTs (liver function tests)     last assessed 09/11/14   • Anemia     last assessed 07/28/14   • Anesthesia complication     Restrictive airway diesease- has asthma attack when extubated   • Asthma    • Asthma 6/17/2013   • Colon polyp    • Diabetes mellitus ([facility] )    • Diabetes mellitus due to underlying condition with hyperglycemia, without long-term current use of insulin ([facility] ) 9/11/2020   • Elevated blood pressure reading     last assessed 06/17/13   • GERD (gastroesophageal reflux disease)    • Hematuria     last assessed 09/11/14   • Hypercalcemia     last assessed 09/11/14   • Hyperlipidemia    • Kidney stone    • Leukocytosis     last assessed 09/11/124   • Microalbuminuria     last assessed 09/11/14   • Paresthesia 9/21/2017   • Reactive airway disease    • Restrictive lung disease    • Skin rash 9/21/2017   • Snoring 6/17/2013   • Type 2 diabetes mellitus with hyperglycemia ([facility] ) 7/2/2013   • Type 2 diabetes mellitus without complication, without long-term current use of insulin ([facility] ) 2020   • Uncontrolled type 2 diabetes mellitus with hyperglycemia ([facility] ) 2021   • UTI (urinary tract infection) 2017         Surgical History     Past Surgical History:   Procedure Laterality Date   •  SECTION     • FL RETROGRADE PYELOGRAM  3/7/2022   • FL RETROGRADE PYELOGRAM  2022   • HYSTERECTOMY      age 37 per MRS   • IR NEPHROSTOMY TUBE PLACEMENT  2022   • IR NEPHROURETERAL ACCESS FOR UROLOGY PCNL  3/1/2022   • OOPHORECTOMY Bilateral     age 37 per MRS, with hysterectomy   • MO PERCUT REMV KID STONE,2+ CM Left 3/7/2022    Procedure: NEPHROLITHOTOMY  PERCUTANEOUS (PCNL); Surgeon: Priscilla Esqueda MD;  [facility];  Service: Urology   • MO PERCUT [address] TO 2 CM Left 2022    Procedure: NEPHROLITHOTOMY  PERCUTANEOUS (PCNL); Surgeon: Priscilla Esqueda MD;  [facility];  Service: Urology   • TOTAL ABDOMINAL HYSTERECTOMY W/ BILATERAL SALPINGOOPHORECTOMY           Family History     Family History   Problem Relation Age of Onset   • Parkinsonism Mother    • Spinal muscular atrophy Mother    • Diabetes Father         borderline   • Melanoma Father    • No Known Problems Sister    • No Known Problems Daughter    • No Known Problems Maternal Grandmother    • No Known Problems Maternal Grandfather    • No Known Problems Paternal Grandmother    • No Known Problems Paternal Grandfather    • No Known Problems Maternal Aunt    • No Known Problems Paternal Aunt          Social History     Social History       Radiology         [address], [place]  2022  7:27 AM  Incomplete  No diagnosis found  Assessment and plan:       1   Kidney stones  - proper hydration and dietary modifications advised  - updated imaging in a few months  - call with symptoms     [address]      Chief Complaint     Kidney stones    History of Present Illness     Pinkey Deiters is a 60 y o  female patient of Dr Isaiah Rodríguez with a history of staghorn calculus presenting for follow-up      History of a left staghorn calculus  Elected for left PCNL  Left PCNU placed 3/1/22 with IR  PCNL 3/7/22 followed by repeat PCNL 4/20/22  Underwent stent and nephrostomy tube removal 5/5/22  renal US (6/7/22)  IMPRESSION:     Mild fullness of left renal collecting system and extrarenal pelvis no greater than minimal left hydronephrosis      Left renal scarring noted  Echogenic shadowing structure at the lower pole the left kidney measuring up to 5 mm could represent nonobstructing stone fragment, parenchymal calcification, or milk of calcium within a small cyst      Small left renal cortical cysts noted largest measuring up to 13 mm  UA    Laboratory     Lab Results   Component Value Date    CREATININE 0 89 10/14/2022     Review of Systems     Review of Systems   Constitutional: Negative for activity change, appetite change, chills, diaphoresis, fatigue, fever and unexpected weight change  Respiratory: Negative for chest tightness and shortness of breath  Cardiovascular: Negative for chest pain, palpitations and leg swelling  Gastrointestinal: Negative for abdominal distention, abdominal pain, constipation, diarrhea, nausea and vomiting  Genitourinary: Negative for decreased urine volume, difficulty urinating, dysuria, enuresis, flank pain, frequency, genital sores, hematuria and urgency  Musculoskeletal: Negative for back pain, gait problem and myalgias  Skin: Negative for color change, pallor, rash and wound  Psychiatric/Behavioral: Negative for behavioral problems  The patient is not nervous/anxious  Allergies     Allergies   Allergen Reactions   • Seasonal Ic  [Cholestatin] Allergic Rhinitis       Physical Exam     Physical Exam  Constitutional:       General: She is not in acute distress  Appearance: Normal appearance  She is normal weight   She is not ill-appearing, toxic-appearing or diaphoretic  HENT:      Head: Normocephalic and atraumatic  Eyes:      General:         Right eye: No discharge  Left eye: No discharge  Conjunctiva/sclera: Conjunctivae normal    Pulmonary:      Effort: Pulmonary effort is normal  No respiratory distress  Musculoskeletal:         General: No swelling or tenderness  Normal range of motion  Skin:     General: Skin is warm and dry  Coloration: Skin is not jaundiced or pale  Neurological:      General: No focal deficit present  Mental Status: She is alert and oriented to person, place, and time  Psychiatric:         Mood and Affect: Mood normal          Behavior: Behavior normal          Thought Content: Thought content normal            Vital Signs     There were no vitals filed for this visit        Current Medications       Current Outpatient Medications:   •  albuterol (ProAir HFA) 90 mcg/act inhaler, Inhale 1-2 puffs every 4 (four) hours as needed for wheezing, Disp: 18 g, Rfl: 3  •  aspirin 81 MG tablet, Take 1 tablet by mouth daily, Disp: , Rfl:   •  buPROPion (WELLBUTRIN XL) 300 mg 24 hr tablet, TAKE 1 TABLET (300 MG TOTAL) BY MOUTH EVERY MORNING , Disp: 30 tablet, Rfl: 1  •  cholecalciferol (VITAMIN D3) 1,000 units tablet, Take 1,000 Units by mouth daily, Disp: , Rfl:   •  Dulaglutide 3 MG/0 5ML SOPN, Inject 0 5 mL (3 mg total) under the skin once a week For diabetes, Disp: 6 mL, Rfl: 1  •  Empagliflozin (Jardiance) 25 MG TABS, Take 1 tablet (25 mg total) by mouth in the morning, Disp: 90 tablet, Rfl: 3  •  glucose blood (OneTouch Ultra) test strip, Use 1 each 3 (three) times a day, Disp: 300 each, Rfl: 3  •  Lancets (OneTouch Delica Plus [id number]) MISC, TEST 3 TIMES A DAY, Disp: 300 each, Rfl: 3  •  lisinopril (ZESTRIL) 5 mg tablet, Take 1 tablet (5 mg total) by mouth daily, Disp: 90 tablet, Rfl: 3  •  metFORMIN (GLUCOPHAGE-XR) 500 mg 24 hr tablet, Take 2 tablets (1,000 mg total) by mouth 2 (two) times a day with meals, Disp: 360 tablet, Rfl: 3  •  montelukast (SINGULAIR) 10 mg tablet, Take 1 tablet (10 mg total) by mouth every evening, Disp: 90 tablet, Rfl: 3  •  potassium citrate (UROCIT-K) 10 mEq, Take 1 tablet (10 mEq total) by mouth 2 (two) times a day, Disp: 120 tablet, Rfl: 6  •  rosuvastatin (CRESTOR) 10 MG tablet, Take 1 tablet (10 mg total) by mouth daily, Disp: 90 tablet, Rfl: 3      Active Problems     Patient Active Problem List   Diagnosis   • Benign essential hypertension   • Hyperlipidemia   • Type 2 diabetes mellitus without complication, without long-term current use of insulin ([facility])   • Kidney stone   • Mild persistent asthma without complication   • Allergic rhinitis   • Anemia   • Staghorn renal calculus   • Incomplete emptying of bladder   • Restrictive airway disease   • Uric acid nephrolithiasis   • Elevated erythrocyte sedimentation rate   • Hematuria, microscopic   • Hot flashes due to menopause   • Irritable   • Irritable bowel syndrome   • Moderate obstructive sleep apnea   • Obesity   • Urge and stress incontinence   • Current severe episode of major depressive disorder without psychotic features ([facility] )   • Attention deficit hyperactivity disorder (ADHD), predominantly inattentive type   • COVID-19 virus infection         Past Medical History     Past Medical History:   Diagnosis Date   • Abnormal LFTs (liver function tests)     last assessed 09/11/14   • Anemia     last assessed 07/28/14   • Anesthesia complication     Restrictive airway diesease- has asthma attack when extubated   • Asthma    • Asthma 6/17/2013   • Colon polyp    • Diabetes mellitus ([facility] )    • Diabetes mellitus due to underlying condition with hyperglycemia, without long-term current use of insulin ([facility] ) 9/11/2020   • Elevated blood pressure reading     last assessed 06/17/13   • GERD (gastroesophageal reflux disease)    • Hematuria     last assessed 09/11/14   • Hypercalcemia     last assessed 09/11/14 • Hyperlipidemia    • Kidney stone    • Leukocytosis     last assessed    • Microalbuminuria     last assessed 14   • Paresthesia 2017   • Reactive airway disease    • Restrictive lung disease    • Skin rash 2017   • Snoring 2013   • Type 2 diabetes mellitus with hyperglycemia ([facility] ) 2013   • Type 2 diabetes mellitus without complication, without long-term current use of insulin ([facility] ) 2020   • Uncontrolled type 2 diabetes mellitus with hyperglycemia ([facility] ) 2021   • UTI (urinary tract infection) 2017         Surgical History     Past Surgical History:   Procedure Laterality Date   •  SECTION     • FL RETROGRADE PYELOGRAM  3/7/2022   • FL RETROGRADE PYELOGRAM  2022   • HYSTERECTOMY      age 37 per MRS   • IR NEPHROSTOMY TUBE PLACEMENT  2022   • IR NEPHROURETERAL ACCESS FOR UROLOGY PCNL  3/1/2022   • OOPHORECTOMY Bilateral     age 37 per MRS, with hysterectomy   • MS PERCUT REMV KID STONE,2+ CM Left 3/7/2022    Procedure: NEPHROLITHOTOMY  PERCUTANEOUS (PCNL); Surgeon: Brneda Dsouza MD;  [facility];  Service: Urology   • MS PERCUT [address] TO 2 CM Left 2022    Procedure: NEPHROLITHOTOMY  PERCUTANEOUS (PCNL);   Surgeon: Brenda Dsouza MD;  [facility];  Service: Urology   • TOTAL ABDOMINAL HYSTERECTOMY W/ BILATERAL SALPINGOOPHORECTOMY           Family History     Family History   Problem Relation Age of Onset   • Parkinsonism Mother    • Spinal muscular atrophy Mother    • Diabetes Father         borderline   • Melanoma Father    • No Known Problems Sister    • No Known Problems Daughter    • No Known Problems Maternal Grandmother    • No Known Problems Maternal Grandfather    • No Known Problems Paternal Grandmother    • No Known Problems Paternal Grandfather    • No Known Problems Maternal Aunt    • No Known Problems Paternal Aunt          Social History     Social History       Radiology

## 2022-12-05 NOTE — PROGRESS NOTES
1  Kidney stone  POCT urine dip    XR abdomen 1 view kub    Urine culture    Urinalysis with microscopic          Assessment and plan:       1  Kidney stones  - s/p left PCNL 2022  - no residual stones  - slight dilation of left collecting system expected given chronic dilation from the previous stone  - asymptomatic  - proper hydration and dietary modifications  - continue potassium citrate  - f/u 1 year KUB prior    2  LUTS  - discussed concern that her SGLT2 inhibitor may be exacerbating her urinary symptoms  She will discuss with PCP/pharmacy about possible alternative therapies  - if ongoing symptoms thereafter may explore urodynamic testing     [id number]      Chief Complaint     Kidney stones    History of Present Illness     Carlton Laura is a 60 y o  female patient  with a history of staghorn calculus presenting for follow-up      History of a left staghorn calculus  Elected for left PCNL  Left PCNU placed 3/1/22 with IR  PCNL 3/7/22 followed by repeat PCNL 4/20/22  Underwent stent and nephrostomy tube removal 5/5/22  renal US (6/7/22)  IMPRESSION:     Mild fullness of left renal collecting system and extrarenal pelvis no greater than minimal left hydronephrosis      Left renal scarring noted  Echogenic shadowing structure at the lower pole the left kidney measuring up to 5 mm could represent nonobstructing stone fragment, parenchymal calcification, or milk of calcium within a small cyst      Small left renal cortical cysts noted largest measuring up to 13 mm  Ongoing LUTS of feeling the need to strain with voiding, urgency, frequency  Urine dip small leukocytes, moderate blood, + glucose  Laboratory     Lab Results   Component Value Date    CREATININE 0 89 10/14/2022     Review of Systems     Review of Systems   Constitutional: Negative for activity change, appetite change, chills, diaphoresis, fatigue, fever and unexpected weight change     Respiratory: Negative for chest tightness and shortness of breath  Cardiovascular: Negative for chest pain, palpitations and leg swelling  Gastrointestinal: Negative for abdominal distention, abdominal pain, constipation, diarrhea, nausea and vomiting  Genitourinary: Positive for decreased urine volume, frequency and urgency  Negative for difficulty urinating, dysuria, enuresis, flank pain, genital sores and hematuria  Musculoskeletal: Negative for back pain, gait problem and myalgias  Skin: Negative for color change, pallor, rash and wound  Psychiatric/Behavioral: Negative for behavioral problems  The patient is not nervous/anxious  Allergies     Allergies   Allergen Reactions   • Seasonal Ic  [Cholestatin] Allergic Rhinitis       Physical Exam     Physical Exam  Constitutional:       General: She is not in acute distress  Appearance: Normal appearance  She is normal weight  She is not ill-appearing, toxic-appearing or diaphoretic  HENT:      Head: Normocephalic and atraumatic  Eyes:      General:         Right eye: No discharge  Left eye: No discharge  Conjunctiva/sclera: Conjunctivae normal    Pulmonary:      Effort: Pulmonary effort is normal  No respiratory distress  Musculoskeletal:         General: No swelling or tenderness  Normal range of motion  Skin:     General: Skin is warm and dry  Coloration: Skin is not jaundiced or pale  Neurological:      General: No focal deficit present  Mental Status: She is alert and oriented to person, place, and time  Psychiatric:         Mood and Affect: Mood normal          Behavior: Behavior normal          Thought Content:  Thought content normal            Vital Signs     Vitals:    12/05/22 1054   BP: 124/78   BP Location: Left arm   Patient Position: Sitting   Cuff Size: Adult   Pulse: 95   SpO2: 100%   Weight: 72 kg (158 lb 12 8 oz)   Height: 5' 4" (1 626 m)         Current Medications       Current Outpatient Medications:   •  albuterol (ProAir HFA) 90 mcg/act inhaler, Inhale 1-2 puffs every 4 (four) hours as needed for wheezing, Disp: 18 g, Rfl: 3  •  aspirin 81 MG tablet, Take 1 tablet by mouth daily, Disp: , Rfl:   •  buPROPion (WELLBUTRIN XL) 300 mg 24 hr tablet, TAKE 1 TABLET (300 MG TOTAL) BY MOUTH EVERY MORNING , Disp: 30 tablet, Rfl: 1  •  cholecalciferol (VITAMIN D3) 1,000 units tablet, Take 1,000 Units by mouth daily, Disp: , Rfl:   •  Dulaglutide 3 MG/0 5ML SOPN, Inject 0 5 mL (3 mg total) under the skin once a week For diabetes, Disp: 6 mL, Rfl: 1  •  Empagliflozin (Jardiance) 25 MG TABS, Take 1 tablet (25 mg total) by mouth in the morning, Disp: 90 tablet, Rfl: 3  •  glucose blood (OneTouch Ultra) test strip, Use 1 each 3 (three) times a day, Disp: 300 each, Rfl: 3  •  Lancets (OneTouch Delica Plus [id number]) MISC, TEST 3 TIMES A DAY, Disp: 300 each, Rfl: 3  •  lisinopril (ZESTRIL) 5 mg tablet, Take 1 tablet (5 mg total) by mouth daily, Disp: 90 tablet, Rfl: 3  •  metFORMIN (GLUCOPHAGE-XR) 500 mg 24 hr tablet, Take 2 tablets (1,000 mg total) by mouth 2 (two) times a day with meals, Disp: 360 tablet, Rfl: 3  •  montelukast (SINGULAIR) 10 mg tablet, Take 1 tablet (10 mg total) by mouth every evening, Disp: 90 tablet, Rfl: 3  •  potassium citrate (UROCIT-K) 10 mEq, Take 1 tablet (10 mEq total) by mouth 2 (two) times a day, Disp: 120 tablet, Rfl: 6  •  rosuvastatin (CRESTOR) 10 MG tablet, Take 1 tablet (10 mg total) by mouth daily, Disp: 90 tablet, Rfl: 3      Active Problems     Patient Active Problem List   Diagnosis   • Benign essential hypertension   • Hyperlipidemia   • Type 2 diabetes mellitus without complication, without long-term current use of insulin (HCC)   • Kidney stone   • Mild persistent asthma without complication   • Allergic rhinitis   • Anemia   • Staghorn renal calculus   • Incomplete emptying of bladder   • Restrictive airway disease   • Uric acid nephrolithiasis   • Elevated erythrocyte sedimentation rate   • Hematuria, microscopic   • Hot flashes due to menopause   • Irritable   • Irritable bowel syndrome   • Moderate obstructive sleep apnea   • Obesity   • Urge and stress incontinence   • Current severe episode of major depressive disorder without psychotic features ([facility] [address] )   • Attention deficit hyperactivity disorder (ADHD), predominantly inattentive type   • COVID-19 virus infection         Past Medical History     Past Medical History:   Diagnosis Date   • Abnormal LFTs (liver function tests)     last assessed 14   • Anemia     last assessed 14   • Anesthesia complication     Restrictive airway diesease- has asthma attack when extubated   • Asthma    • Asthma 2013   • Colon polyp    • Diabetes mellitus ([facility] [address] )    • Diabetes mellitus due to underlying condition with hyperglycemia, without long-term current use of insulin ([facility] [address] ) 2020   • Elevated blood pressure reading     last assessed 13   • GERD (gastroesophageal reflux disease)    • Hematuria     last assessed 14   • Hypercalcemia     last assessed 14   • Hyperlipidemia    • Kidney stone    • Leukocytosis     last assessed    • Microalbuminuria     last assessed 14   • Paresthesia 2017   • Reactive airway disease    • Restrictive lung disease    • Skin rash 2017   • Snoring 2013   • Type 2 diabetes mellitus with hyperglycemia ([facility] [address] ) 2013   • Type 2 diabetes mellitus without complication, without long-term current use of insulin ([facility] [address] ) 2020   • Uncontrolled type 2 diabetes mellitus with hyperglycemia ([facility] [address] ) 2021   • UTI (urinary tract infection) 2017         Surgical History     Past Surgical History:   Procedure Laterality Date   •  SECTION     • FL RETROGRADE PYELOGRAM  3/7/2022   • FL RETROGRADE PYELOGRAM  2022   • HYSTERECTOMY      age 37 per [facility]   • IR NEPHROSTOMY TUBE PLACEMENT  2022   • IR NEPHROURETERAL ACCESS FOR UROLOGY PCNL  3/1/2022   • OOPHORECTOMY Bilateral     age 37 per [facility], with hysterectomy   • DE PERCUT REMV KID STONE,2+ CM Left 3/7/2022    Procedure: NEPHROLITHOTOMY  PERCUTANEOUS (PCNL); Surgeon: Rajani Jaime MD;  Location: [facility];  Service: Urology   • DE PERCUT [address] TO 2 CM Left 4/20/2022    Procedure: NEPHROLITHOTOMY  PERCUTANEOUS (PCNL);   Surgeon: Rajani Jaime MD;  Location: [facility];  Service: Urology   • TOTAL ABDOMINAL HYSTERECTOMY W/ BILATERAL SALPINGOOPHORECTOMY           Family History     Family History   Problem Relation Age of Onset   • Parkinsonism Mother    • Spinal muscular atrophy Mother    • Diabetes Father         borderline   • Melanoma Father    • No Known Problems Sister    • No Known Problems Daughter    • No Known Problems Maternal Grandmother    • No Known Problems Maternal Grandfather    • No Known Problems Paternal Grandmother    • No Known Problems Paternal Grandfather    • No Known Problems Maternal Aunt    • No Known Problems Paternal Aunt          Social History     Social History       Radiology

## 2022-12-06 LAB
BACTERIA UR CULT: ABNORMAL
BACTERIA UR CULT: ABNORMAL

## 2022-12-18 DIAGNOSIS — F32.2 CURRENT SEVERE EPISODE OF MAJOR DEPRESSIVE DISORDER WITHOUT PSYCHOTIC FEATURES, UNSPECIFIED WHETHER RECURRENT (HCC): ICD-10-CM

## 2022-12-18 DIAGNOSIS — F90.0 ATTENTION DEFICIT HYPERACTIVITY DISORDER (ADHD), PREDOMINANTLY INATTENTIVE TYPE: ICD-10-CM

## 2022-12-18 RX ORDER — BUPROPION HYDROCHLORIDE 300 MG/1
300 TABLET ORAL EVERY MORNING
Qty: 30 TABLET | Refills: 1 | Status: SHIPPED | OUTPATIENT
Start: 2022-12-18

## 2022-12-23 ENCOUNTER — HOSPITAL ENCOUNTER (OUTPATIENT)
Dept: RADIOLOGY | Age: 57
Discharge: HOME | End: 2022-12-23

## 2022-12-23 VITALS — HEIGHT: 64 IN | WEIGHT: 158 LBS | BODY MASS INDEX: 26.98 KG/M2

## 2022-12-23 DIAGNOSIS — Z12.31 ENCOUNTER FOR SCREENING MAMMOGRAM FOR BREAST CANCER: ICD-10-CM

## 2023-01-25 ENCOUNTER — OFFICE VISIT (OUTPATIENT)
Dept: BEHAVIORAL/MENTAL HEALTH CLINIC | Facility: CLINIC | Age: 58
End: 2023-01-25

## 2023-01-25 DIAGNOSIS — F39 MOOD DISORDER (HCC): ICD-10-CM

## 2023-01-25 DIAGNOSIS — R41.840 COGNITIVE ATTENTION DEFICIT: Primary | ICD-10-CM

## 2023-01-25 DIAGNOSIS — F41.9 ANXIETY: ICD-10-CM

## 2023-01-25 DIAGNOSIS — R41.89 COGNITIVE CHANGE: ICD-10-CM

## 2023-01-25 RX ORDER — SERTRALINE HYDROCHLORIDE 25 MG/1
25 TABLET, FILM COATED ORAL DAILY
Qty: 30 TABLET | Refills: 0 | Status: SHIPPED | OUTPATIENT
Start: 2023-01-25 | End: 2023-02-24

## 2023-01-25 NOTE — PSYCH
[address] [facility]    Name and Date of Birth:  Brett Orozco 59 y o  1965 MRN: [id number]    Date of Visit: January 25, 2023    Reason for visit: Initial psychiatric intake assessment    Chief complaint: "I feel I have adult ADHD"    History of Present Illness (HPI):      Brett Orozco is a 59 y o , white, , female, possessing no previous psychiatric history, medically complicated by H9AA, , presenting to the [address] [place] [address] outpatient clinic for intake assessment per referral by Dr Merlyn Win for evaluation for potential ADHD  Damaris Bryant states that her attention, concentration, focus ability to complete tasks has significantly worsened in the past 10 years  She also reports memory difficulties including expressive language difficulties, some short term memory deficits, difficulty with directions  Alongside the above mentioned symptoms, she reports new onset tremor as well as dizziness about 1 month ago  She states that she does not feel depressed "most of the time" however does endorse some feelings of sadness due to recent changes to cognitive ability  She states she was started on Wellbutrin 150 mg daily, after which was increased to 300 mg daily by her PCP  Writes a lot of notes, start a lot of things with good intentions, starts multiple projects, tasks, not able to complete which causes her anxiety as well as mood symptoms  Reports tremor started about 1 month ago  Was very organized in the past, about 10 years, completing projects in the past, she states that she had no difficulties with attention and focus as a child or teenager and these are all new onset symptoms from about 10 years ago  Presently, patient denies suicidal/homicidal ideation in addition to thoughts of self-injury; contracts for safety, see below for risk assessment   At conclusion of evaluation, patient is amenable to the recommendations of this writer including: starting psychotropic medications as prescribed  Also, patient is amenable to calling/contacting the outpatient office including this writer if any acute adverse effects of their medication regimen arise in addition to any comments or concerns pertaining to their psychiatric management  Patient is amenable to calling/contacting crisis and/or attending to the nearest emergency department if their clinical condition deteriorates to assure their safety and stability, stating that they are able to appropriately confide in their  regarding their psychiatric state  Current Rating Scores:     Current PHQ-9   PHQ-2/9 Depression Screening    Little interest or pleasure in doing things: 1 - several days  Feeling down, depressed, or hopeless: 0 - not at all  Trouble falling or staying asleep, or sleeping too much: 2 - more than half the days  Feeling tired or having little energy: 1 - several days  Poor appetite or overeatin - more than half the days  Feeling bad about yourself - or that you are a failure or have let yourself or your family down: 1 - several days  Trouble concentrating on things, such as reading the newspaper or watching television: 3 - nearly every day  Moving or speaking so slowly that other people could have noticed  Or the opposite - being so fidgety or restless that you have been moving around a lot more than usual: 0 - not at all  Thoughts that you would be better off dead, or of hurting yourself in some way: 0 - not at all  PHQ-9 Score: 10   PHQ-9 Interpretation: Moderate depression        Current CHELITA-7 is   CHELITA-7 Flowsheet Screening    Flowsheet Row Most Recent Value   Over the last 2 weeks, how often have you been bothered by any of the following problems?     Feeling nervous, anxious, or on edge 2   Not being able to stop or control worrying 0   Worrying too much about different things 1   Trouble relaxing 3   Being so restless that it is hard to sit still 2   Becoming easily annoyed or irritable 1   Feeling afraid as if something awful might happen 0   CHELITA-7 Total Score 9        Psychiatric Review Of Systems:    Appetite: fluctuats  Adverse eating: yes, some binging  Weight changes: 25 lb weight loss past 2 years  Insomnia/sleeplessness: trouble falling asleep  Fatigue/anergy: yes  Anhedonia/lack of interest: Some  Attention/concentration: decreased  Psychomotor agitation/retardation: no  Somatic symptoms: no  Anxiety/panic attack: Yes, no panic attacks  Juli/hypomania: no  Hopelessness/helplessness/worthlessness: Some  Self-injurious behavior/high-risk behavior: no  Suicidal ideation: no  Homicidal ideation: no  Auditory hallucinations: no  Visual hallucinations: no  Other perceptual disturbances: no  Delusional thinking: no  Obsessive/compulsive symptoms: no    Review Of Systems:    Constitutional negative   ENT blurred vision   Cardiovascular negative   Respiratory negative   Gastrointestinal negative   Genitourinary negative   Musculoskeletal negative   Integumentary negative   Neurological dizziness   Endocrine negative   Other Symptoms none, all other systems are negative       Family Psychiatric History:     Family History   Problem Relation Age of Onset   • Parkinsonism Mother    • Spinal muscular atrophy Mother    • Diabetes Father         borderline   • Melanoma Father    • No Known Problems Sister    • No Known Problems Daughter    • No Known Problems Maternal Grandmother    • No Known Problems Maternal Grandfather    • No Known Problems Paternal Grandmother    • No Known Problems Paternal Grandfather    • No Known Problems Maternal Aunt    • No Known Problems Paternal Aunt        Denies substance abuse or suicidality in immediate relations       Past Psychiatric History:     Previous inpatient psychiatric admissions: no   Previous inpatient/outpatient substance abuse rehabilitation: no  Present/previous outpatient psychiatric services: no  Present/previous psychotherapy services: no  History of suicidal attempts/gestures:  no  History of violence/aggressive behaviors: no  Present/previous psychotropic medication use: Wellbutrin XLup to 300 mg qd  ineffective    Substance Abuse History:    Patient denies history of alcohol, illict substance, or tobacco abuse  Patient denies previous legal actions or arrests related to substance intoxication including prior DWIs/DUIs  Marlo Bowens does not apear under the influence or withdrawal of any psychoactive substance throughout today's examination  Social History:    Developmental: denies a history of milestone/developmental delay  Denies a history of in-utero exposure to toxins/illicit substances  There is no documented history of IEP or need for special education  Academic history: some college  Marital history:   Social support system: , friends  Children: 2 daughters (22YO and 26YO)  Living arrangement:   Vocational History:   Access to firearms: denies direct access to weapons/firearms  Midge Dubin has no history of arrests or violence pertaining to use of a deadly weapon       Traumatic History:     Abuse:none is reported  Other Traumatic Events: denies    Past Medical History:    Past Medical History:   Diagnosis Date   • Abnormal LFTs (liver function tests)     last assessed 09/11/14   • Anemia     last assessed 07/28/14   • Anesthesia complication     Restrictive airway diesease- has asthma attack when extubated   • Asthma    • Asthma 6/17/2013   • Colon polyp    • Diabetes mellitus ([facility] [address] )    • Diabetes mellitus due to underlying condition with hyperglycemia, without long-term current use of insulin ([facility] [address] ) 9/11/2020   • Elevated blood pressure reading     last assessed 06/17/13   • GERD (gastroesophageal reflux disease)    • Hematuria     last assessed 09/11/14   • Hypercalcemia     last assessed 09/11/14   • Hyperlipidemia    • Kidney stone    • Leukocytosis     last assessed    • Microalbuminuria     last assessed 14   • Paresthesia 2017   • Reactive airway disease    • Restrictive lung disease    • Skin rash 2017   • Snoring 2013   • Type 2 diabetes mellitus with hyperglycemia ([facility] [address] ) 2013   • Type 2 diabetes mellitus without complication, without long-term current use of insulin ([facility] [address] ) 2020   • Uncontrolled type 2 diabetes mellitus with hyperglycemia ([facility] [address] ) 2021   • UTI (urinary tract infection) 2017        Past Surgical History:   Procedure Laterality Date   •  SECTION     • FL RETROGRADE PYELOGRAM  3/7/2022   • FL RETROGRADE PYELOGRAM  2022   • HYSTERECTOMY      age 37 per MRS   • IR NEPHROSTOMY TUBE PLACEMENT  2022   • IR NEPHROURETERAL ACCESS FOR UROLOGY PCNL  3/1/2022   • OOPHORECTOMY Bilateral     age 37 per MRS, with hysterectomy   • WI PERQ NL/PL LITHOTRP COMPLEX >2 CM MLT LOCATIONS Left 3/7/2022    Procedure: NEPHROLITHOTOMY  PERCUTANEOUS (PCNL); Surgeon: Janes Trevino MD;  Location: [facility];  Service: Urology   • WI PERQ NL/PL LITHOTRP SIMPLE UP TO 2 CM 1 LOCATION Left 2022    Procedure: NEPHROLITHOTOMY  PERCUTANEOUS (PCNL); Surgeon: Janes Trevino MD;  Location: [facility];  Service: Urology   • TOTAL ABDOMINAL HYSTERECTOMY W/ BILATERAL SALPINGOOPHORECTOMY       Allergies   Allergen Reactions   • Seasonal Ic  [Cholestatin] Allergic Rhinitis       History Review: The following portions of the patient's history were reviewed and updated as appropriate: allergies, current medications, past family history, past medical history, past social history, past surgical history and problem list     OBJECTIVE:    Vital signs in last 24 hours: There were no vitals filed for this visit      Mental Status Evaluation:    Appearance age appropriate, casually dressed   Behavior cooperative, mildly anxious   Speech normal rate, normal volume, normal pitch, hypertalkative   Mood sometimes I'm sad   Affect normal range and intensity, appropriate   Thought Processes Tangential at times   Associations intact associations   Thought Content no overt delusions   Perceptual Disturbances: no auditory hallucinations, no visual hallucinations   Abnormal Thoughts  Risk Potential Suicidal ideation - None  Homicidal ideation - None  Potential for aggression - No   Orientation oriented to person, place and situation   Memory short term memory mildly impaired   Consciousness alert and awake   Attention Span Concentration Span attention span and concentration appear shorter than expected for age   Intellect appears to be of average intelligence   Insight intact   Judgement intact   Muscle Strength and  Gait normal gait and normal balance   Motor Activity no abnormal movements   Language no difficulty naming common objects   Fund of Knowledge adequate fund of knowledge regarding past history  adequate fund of knowledge regarding vocabulary    Pain none   Pain Scale 0       Laboratory Results: I have personally reviewed all pertinent laboratory/tests results    Recent Labs (last 6 months):   Office Visit on 12/05/2022   Component Date Value   • LEUKOCYTE ESTERASE,UA 12/05/2022 small    • NITRITE,UA 12/05/2022 neg    • SL AMB POCT UROBILINOGEN 12/05/2022 0 2    • POCT URINE PROTEIN 12/05/2022 neg    •  PH,UA 12/05/2022 5 0    • BLOOD,UA 12/05/2022 moderate    • SPECIFIC GRAVITY,UA 12/05/2022 1 020    • KETONES,UA 12/05/2022 trace    • BILIRUBIN,UA 12/05/2022 neg    • GLUCOSE, UA 12/05/2022 2,000    •  COLOR,UA 12/05/2022 yellow    • CLARITY,UA 12/05/2022 clear    • Urine Culture 12/05/2022 10,000-19,000 cfu/ml Beta Hemolytic Streptococcus Group B (A)    • Urine Culture 12/05/2022 20,000-29,000 cfu/ml    • Color, UA 12/05/2022 Light Yellow    • Clarity, UA 12/05/2022 Turbid    • Specific Gravity, UA 12/05/2022 1 029    • pH, UA 12/05/2022 5 5    • Leukocytes, UA 12/05/2022 Large (A)    • Nitrite, UA 12/05/2022 Negative    • Protein, UA 12/05/2022 Trace (A)    • Glucose, UA 12/05/2022 >=1000 (1%) (A)    • Ketones, UA 12/05/2022 Negative    • Urobilinogen, UA 12/05/2022 <2 0    • Bilirubin, UA 12/05/2022 Negative    • Occult Blood, UA 12/05/2022 Negative    • RBC, UA 12/05/2022 2-4 (A)    • WBC, UA 12/05/2022 Innumerable (A)    • Epithelial Cells 12/05/2022 Occasional    • Bacteria, UA 12/05/2022 Occasional    • WBC Clumps 12/05/2022 Present    • Budding Yeast 12/05/2022 Present    Office Visit on 10/19/2022   Component Date Value   • LEUKOCYTE ESTERASE,UA 10/19/2022 1+    • NITRITE,UA 10/19/2022 neg    • SL AMB POCT UROBILINOGEN 10/19/2022 3 5    • POCT URINE PROTEIN 10/19/2022 neg    •  PH,UA 10/19/2022 6 0    • BLOOD,UA 10/19/2022 neg    • SPECIFIC GRAVITY,UA 10/19/2022 1 025    • KETONES,UA 10/19/2022 neg    • BILIRUBIN,UA 10/19/2022 neg    • GLUCOSE, UA 10/19/2022 2+    •  COLOR,UA 10/19/2022 pale yellow    • CLARITY,UA 10/19/2022 clear    • Urine Culture 10/19/2022 >100,000 cfu/ml    • Creatinine, Ur 10/19/2022 41 2    • Microalbum  ,U,Random 10/19/2022 56 6 (H)    • Microalb Creat Ratio 10/19/2022 137 (H)    Appointment on 10/14/2022   Component Date Value   • Hemoglobin A1C 10/14/2022 6 3 (H)    • EAG 10/14/2022 134    • Sodium 10/14/2022 137    • Potassium 10/14/2022 4 3    • Chloride 10/14/2022 99    • CO2 10/14/2022 27    • ANION GAP 10/14/2022 11    • BUN 10/14/2022 24    • Creatinine 10/14/2022 0 89    • Glucose, Fasting 10/14/2022 115 (H)    • Calcium 10/14/2022 9 8    • AST 10/14/2022 24    • ALT 10/14/2022 35    • Alkaline Phosphatase 10/14/2022 99    • Total Protein 10/14/2022 7 5    • Albumin 10/14/2022 4 5    • Total Bilirubin 10/14/2022 0 26    • eGFR 10/14/2022 71    • WBC 10/14/2022 9 11    • RBC 10/14/2022 4 95    • Hemoglobin 10/14/2022 11 9    • Hematocrit 10/14/2022 38 6    • MCV 10/14/2022 78 (L)    • MCH 10/14/2022 24 0 (L)    • MCHC 10/14/2022 30 8 (L)    • RDW 10/14/2022 18 1 (H)    • Platelets 33/79/3149 360    • MPV 10/14/2022 8 9    • Creatinine, Ur 10/14/2022 75 1    • Microalbum  ,U,Random 10/14/2022 44 3 (H)    • Microalb Creat Ratio 10/14/2022 59 (H)    • TSH 3RD GENERATON 10/14/2022 1 870    • Cholesterol 10/14/2022 117    • Triglycerides 10/14/2022 309 (H)    • HDL, Direct 10/14/2022 37 (L)    • LDL Calculated 10/14/2022 18    • Ferritin 10/14/2022 6 (L)        Suicide/Homicide Risk Assessment:    Risk of Harm to Self:  The following ratings are based on assessment at the time of the interview  Demographic risk factors include: , age: over 48 or older  Historical Risk Factors include: none  Recent Specific Risk Factors include: diagnosis of mood disorder  Protective Factors: no current suicidal ideation  Weapons: none  The following steps have been taken to ensure weapons are properly secured: not applicable  Based on today's assessment, Bonifacio Stallings presents the following risk of harm to self: minimal    Risk of Harm to Others: The following ratings are based on assessment at the time of the interview  Demographic Risk Factors include: none  Historical Risk Factors include: none  Recent Specific Risk Factors include: none  Protective Factors: no current homicidal ideation  Weapons: none  The following steps have been taken to ensure weapons are properly secured: not applicable  Based on today's assessment, Bonifacio Stallings presents the following risk of harm to others: minimal    The following interventions are recommended: no intervention changes needed  Although patient's acute lethality risk is low, long-term/chronic lethality risk is mildly elevated in the presence of see above  At the current moment, Bonifacio Stallings is future-oriented, forward-thinking, and demonstrates ability to act in a self-preserving manner as evidenced by volitionally presenting to the clinic today, seeking treatment  At this juncture, inpatient hospitalization is not currently warranted   To mitigate future risk, patient should adhere to the recommendations of this writer, avoid alcohol/illicit substance use, utilize community-based resources and familiar support and prioritize mental health treatment  Based on today's assessment and clinical criteria, Gabriel Benavides contracts for safety and is not an imminent risk of harm to self or others  Outpatient level of care is deemed appropriate at this present time  Doris Jeffrey understands that if they are no longer able to contract for safety, they need to call/contact the outpatient office including this writer, call/contact crisis and/orattend to the nearest Emergency Department for immediate evaluation  Assessment/Plan:   Gabriel Benavides is a 59 y o , white, , female, possessing no previous psychiatric history, medically complicated by T1SP, , presenting to the [address] [place] [address] [address] outpatient clinic for intake assessment per referral by Dr Davion Guerrero for evaluation for potential ADHD  Doris Jeffrey states that her attention, concentration, focus ability to complete tasks has significantly worsened in the past 10 years  She also reports memory difficulties including expressive language difficulties, some short term memory deficits, difficulty with directions  Due to onset of symptoms, though there is attention, concentration, focus impairment it is unlikely due to ADHD and may be due to some cognitive changes of unclear etiology  Patient does report recent onset tremor as well a dizziness and further medical work-up is warranted    Denies SI, HI, AVH    Diagnoses:     • Cognitive changes  • Attention and concentration deficits of unclear etiology  · Anxiety likely secondary to symptoms  · Mood disorder, likely secondary to recent symptoms    Cannot rule out medical etiology of the a forementioned symptoms including thyroid disorder, etc     Treatment Recommendations/Precautions:    • Labs ordered: CBC, CMP, TSH, vitamin D, B12, folate levels as well as RPR  • Taper off Wellbutrin as this medication is ineffective nor indicated  • Start sertraline 25 mg daily for symptoms of anxiety which may improve attention and concentration  • Next appointment, complete MoCA  • Medication management every 6 weeks  • Aware of 24 hour and weekend coverage for urgent situations accessed by calling [facility] main practice number    Medications Risks/Benefits:      Risks, Benefits And Possible Side Effects Of Medications:    Risks, benefits, and possible side effects of medications explained to Maycol Khan and she verbalizes understanding and agreement for treatment  including: PARQ completed including serotonin syndrome, SIADH, worsening depression, suicidality, induction of fermín, GI upset, headaches, activation, sexual side effects, sedation, potential drug interactions, and others       Controlled Medication Discussion:     No recent records found for controlled prescriptions according to [facility]      This note was shared with patient      Visit Time    Visit Start Time: 8:50 PM  Visit Stop Time: 9:50 PM  Total Visit Duration: 60 minutes    Ethelene Severin, MD 01/25/23

## 2023-02-22 DIAGNOSIS — F41.9 ANXIETY: ICD-10-CM

## 2023-02-22 RX ORDER — SERTRALINE HYDROCHLORIDE 25 MG/1
25 TABLET, FILM COATED ORAL DAILY
Qty: 30 TABLET | Refills: 0 | Status: SHIPPED | OUTPATIENT
Start: 2023-02-22 | End: 2023-03-24

## 2023-03-16 ENCOUNTER — TELEPHONE (OUTPATIENT)
Dept: INTERNAL MEDICINE CLINIC | Facility: CLINIC | Age: 58
End: 2023-03-16

## 2023-03-16 DIAGNOSIS — E11.9 TYPE 2 DIABETES MELLITUS WITHOUT COMPLICATION, WITHOUT LONG-TERM CURRENT USE OF INSULIN (HCC): Primary | ICD-10-CM

## 2023-03-16 NOTE — TELEPHONE ENCOUNTER
Patient called in said her pharmacy told her that they do currently have (Trulicity 3 0) she asked if you would send then asap to [facility] -

## 2023-03-27 ENCOUNTER — APPOINTMENT (OUTPATIENT)
Dept: LAB | Facility: HOSPITAL | Age: 58
End: 2023-03-27

## 2023-03-27 DIAGNOSIS — F41.9 ANXIETY: ICD-10-CM

## 2023-03-27 DIAGNOSIS — R41.89 COGNITIVE CHANGE: ICD-10-CM

## 2023-03-27 DIAGNOSIS — R41.840 COGNITIVE ATTENTION DEFICIT: ICD-10-CM

## 2023-03-27 DIAGNOSIS — F39 MOOD DISORDER (HCC): ICD-10-CM

## 2023-03-27 LAB
25(OH)D3 SERPL-MCNC: 34.5 NG/ML (ref 30–100)
ALBUMIN SERPL BCP-MCNC: 4.4 G/DL (ref 3.5–5)
ALP SERPL-CCNC: 84 U/L (ref 34–104)
ALT SERPL W P-5'-P-CCNC: 17 U/L (ref 7–52)
ANION GAP SERPL CALCULATED.3IONS-SCNC: 10 MMOL/L (ref 4–13)
AST SERPL W P-5'-P-CCNC: 16 U/L (ref 13–39)
BASOPHILS # BLD AUTO: 0.03 THOUSANDS/ÂΜL (ref 0–0.1)
BASOPHILS NFR BLD AUTO: 0 % (ref 0–1)
BILIRUB SERPL-MCNC: 0.26 MG/DL (ref 0.2–1)
BUN SERPL-MCNC: 17 MG/DL (ref 5–25)
CALCIUM SERPL-MCNC: 9.8 MG/DL (ref 8.4–10.2)
CHLORIDE SERPL-SCNC: 101 MMOL/L (ref 96–108)
CO2 SERPL-SCNC: 27 MMOL/L (ref 21–32)
CREAT SERPL-MCNC: 0.76 MG/DL (ref 0.6–1.3)
EOSINOPHIL # BLD AUTO: 0.18 THOUSAND/ÂΜL (ref 0–0.61)
EOSINOPHIL NFR BLD AUTO: 2 % (ref 0–6)
ERYTHROCYTE [DISTWIDTH] IN BLOOD BY AUTOMATED COUNT: 14.3 % (ref 11.6–15.1)
FOLATE SERPL-MCNC: 19.5 NG/ML (ref 3.1–17.5)
GFR SERPL CREATININE-BSD FRML MDRD: 86 ML/MIN/1.73SQ M
GLUCOSE P FAST SERPL-MCNC: 119 MG/DL (ref 65–99)
HCT VFR BLD AUTO: 35.1 % (ref 34.8–46.1)
HGB BLD-MCNC: 11.1 G/DL (ref 11.5–15.4)
IMM GRANULOCYTES # BLD AUTO: 0.02 THOUSAND/UL (ref 0–0.2)
IMM GRANULOCYTES NFR BLD AUTO: 0 % (ref 0–2)
LYMPHOCYTES # BLD AUTO: 3.36 THOUSANDS/ÂΜL (ref 0.6–4.47)
LYMPHOCYTES NFR BLD AUTO: 39 % (ref 14–44)
MCH RBC QN AUTO: 26.9 PG (ref 26.8–34.3)
MCHC RBC AUTO-ENTMCNC: 31.6 G/DL (ref 31.4–37.4)
MCV RBC AUTO: 85 FL (ref 82–98)
MONOCYTES # BLD AUTO: 0.52 THOUSAND/ÂΜL (ref 0.17–1.22)
MONOCYTES NFR BLD AUTO: 6 % (ref 4–12)
NEUTROPHILS # BLD AUTO: 4.62 THOUSANDS/ÂΜL (ref 1.85–7.62)
NEUTS SEG NFR BLD AUTO: 53 % (ref 43–75)
NRBC BLD AUTO-RTO: 0 /100 WBCS
PLATELET # BLD AUTO: 333 THOUSANDS/UL (ref 149–390)
PMV BLD AUTO: 9.5 FL (ref 8.9–12.7)
POTASSIUM SERPL-SCNC: 4.4 MMOL/L (ref 3.5–5.3)
PROT SERPL-MCNC: 7.1 G/DL (ref 6.4–8.4)
RBC # BLD AUTO: 4.12 MILLION/UL (ref 3.81–5.12)
SODIUM SERPL-SCNC: 138 MMOL/L (ref 135–147)
TSH SERPL DL<=0.05 MIU/L-ACNC: 1.27 UIU/ML (ref 0.45–4.5)
VIT B12 SERPL-MCNC: 1955 PG/ML (ref 100–900)
WBC # BLD AUTO: 8.73 THOUSAND/UL (ref 4.31–10.16)

## 2023-03-28 LAB — TREPONEMA PALLIDUM IGG+IGM AB [PRESENCE] IN SERUM OR PLASMA BY IMMUNOASSAY: NORMAL

## 2023-03-30 DIAGNOSIS — F41.9 ANXIETY: ICD-10-CM

## 2023-03-30 RX ORDER — SERTRALINE HYDROCHLORIDE 25 MG/1
25 TABLET, FILM COATED ORAL DAILY
Qty: 30 TABLET | Refills: 0 | Status: SHIPPED | OUTPATIENT
Start: 2023-03-30 | End: 2023-04-29

## 2023-04-05 DIAGNOSIS — N20.0 STAGHORN RENAL CALCULUS: ICD-10-CM

## 2023-04-05 RX ORDER — POTASSIUM CITRATE 10 MEQ/1
10 TABLET, EXTENDED RELEASE ORAL 2 TIMES DAILY
Qty: 120 TABLET | Refills: 0 | Status: SHIPPED | OUTPATIENT
Start: 2023-04-05

## 2023-04-27 ENCOUNTER — CLINICAL SUPPORT (OUTPATIENT)
Dept: INTERNAL MEDICINE CLINIC | Facility: CLINIC | Age: 58
End: 2023-04-27

## 2023-04-27 VITALS
SYSTOLIC BLOOD PRESSURE: 128 MMHG | DIASTOLIC BLOOD PRESSURE: 88 MMHG | WEIGHT: 172.6 LBS | OXYGEN SATURATION: 98 % | BODY MASS INDEX: 29.63 KG/M2 | HEART RATE: 88 BPM

## 2023-04-27 DIAGNOSIS — E11.9 TYPE 2 DIABETES MELLITUS WITHOUT COMPLICATION, WITHOUT LONG-TERM CURRENT USE OF INSULIN (HCC): Primary | ICD-10-CM

## 2023-04-27 NOTE — PROGRESS NOTES
[address] [place] [address], [facility]    Current Diabetes Regimen:  Trulicity  Metformin     ASSESSMENT/PLAN                                                                                     Type 2 Diabetes: goal A1c <6 5% based on AACE/ACE guidelines  Most recent A1c slightly above goal     FBG avg above goal per report   Significant weight increase    BMP acceptable; b12 above goal       Duration: < 10 years (diagnosis 2013)  Microvascular complications: microalbuminuria  Macrovascular complications: none  (Yes ) Aspirin   (Yes ) Statin   (Yes ) ACEI/ARB  Eye Exam:    Lab Results   Component Value Date    LEFTDIABRET None 03/23/2022    RIGHTDIABRET None 03/23/2022   Foot Exam: 2/2022    Medications: If PCP is in agreeance, will convert to Mounjaro 7 5mg weekly; patient aware Jamee Acosta will likely require PA and will  Trulicity to continue in interim  Home Monitoring: every other day   Lifestyle Modifications: reviewed having patient try food bars (ie Carolann bar, Rx bar, Kind Bar) instead of Protein shake  2  Hyperlipidemia without clinical ASCVD event: 2018 ACC/AHA lipid guidelines recommend at least moderate statin  Patient currently on moderate intensity and tolerating well without side effects; myalgia with higher dose of atorvastatin  Lipid panel/LFTs show elevated TG likely related to weight increase/A1c but LDL acceptable       Medications: continue current statin for now but will monitor for improvement in TG    Follow-Up: 2-3 months, will determine based on pa determination and PCP appointment       SUBJECTIVE                                                                                                            Medication Adherence: Medication list reviewed with patient, reports the following discrepancies/problems:  Michael Nyn: took last dose on 4/20; does  Not have pens remaining at home  Sertraline: has been taking but feels like she did better on bupropion    Was fasting for most recent labs, does not eat a lot of sweets  2  Medication Efficacy:    Review of Systems   Constitutional: Positive for appetite change (increased)  Negative for activity change and unexpected weight change  Gastrointestinal: Negative for constipation, diarrhea, nausea and vomiting  Musculoskeletal: Negative for myalgias  Neurological: Negative for dizziness and light-headedness  Psychiatric/Behavioral:        Reports increased stress from change in mother's health     Home blood sugar readings: checks once daily, fasting  FB today, 156 yesterday, always > 130    Home Monitoring:  Glucometer: Yes, Brand: OneTouch Ultra  CGM: No, Brand: NA    3  Lifestyle: 172 2 lbs at home this morning  Has been eating more fruit during the day due to stress and also having 2 Premier protein shakes as a snack (not as a meal replacement) due to being busy caring for mother and MIL; used to do a Premier protein shake a couple times per week        Social History     Tobacco Use   Smoking Status Never   Smokeless Tobacco Never     Social History     Substance and Sexual Activity   Alcohol Use Yes    Comment: social          OBJECTIVE                                                                                                      Pertinent Lab Data:     Lab Results   Component Value Date    SODIUM 138 2023    K 4 2 2023    EGFR 78 2023    CREATININE 0 82 2023    GLUF 130 (H) 2023    DWCYOVVJ73 1,955 (H) 2023    MICROALBCRE 137 (H) 10/19/2022       Lab Results   Component Value Date    HGBA1C 6 8 (H) 2023    HGBA1C 6 3 (H) 10/14/2022    HGBA1C 6 3 (H) 2022     Lab Results   Component Value Date    CHOLESTEROL 169 2023    CHOLESTEROL 117 10/14/2022    CHOLESTEROL 161 2022     Lab Results   Component Value Date    HDL 47 (L) 2023    HDL 37 (L) 10/14/2022    HDL 42 (L) 2022     Lab Results   Component Value Date TRIG 284 (H) 04/17/2023    TRIG 309 (H) 10/14/2022    TRIG 275 (H) 06/06/2022     Lab Results   Component Value Date    LDLCALC 65 04/17/2023 [address] 10/14/2022    [address] 06/06/2022     Pharmacist Tracking Tool  Reason For Outreach: Embedded Pharmacist  Demographics:  Intervention Method:  In Person  Type of Intervention: Follow-Up  Topics Addressed: Diabetes  Pharmacologic Interventions: Medication Initiation and Medication Discontinuation  Non-Pharmacologic Interventions: Disease state education  Time:  Direct Patient Care: 35 mins  Care Coordination: 15 mins  Recommendation Recipient: Patient/Caregiver  Outcome: Accepted Ambulance EMS

## 2023-05-01 ENCOUNTER — OFFICE VISIT (OUTPATIENT)
Dept: INTERNAL MEDICINE CLINIC | Facility: CLINIC | Age: 58
End: 2023-05-01

## 2023-05-01 VITALS
WEIGHT: 171.4 LBS | DIASTOLIC BLOOD PRESSURE: 80 MMHG | HEIGHT: 64 IN | BODY MASS INDEX: 29.26 KG/M2 | HEART RATE: 93 BPM | SYSTOLIC BLOOD PRESSURE: 136 MMHG | OXYGEN SATURATION: 99 %

## 2023-05-01 DIAGNOSIS — E11.9 TYPE 2 DIABETES MELLITUS WITHOUT COMPLICATION, WITHOUT LONG-TERM CURRENT USE OF INSULIN (HCC): Primary | ICD-10-CM

## 2023-05-01 DIAGNOSIS — D50.8 IRON DEFICIENCY ANEMIA SECONDARY TO INADEQUATE DIETARY IRON INTAKE: ICD-10-CM

## 2023-05-01 DIAGNOSIS — E66.09 CLASS 1 OBESITY DUE TO EXCESS CALORIES WITH SERIOUS COMORBIDITY AND BODY MASS INDEX (BMI) OF 30.0 TO 30.9 IN ADULT: ICD-10-CM

## 2023-05-01 PROBLEM — U07.1 COVID-19 VIRUS INFECTION: Status: RESOLVED | Noted: 2022-09-17 | Resolved: 2023-05-01

## 2023-05-01 RX ORDER — FERROUS SULFATE TAB EC 324 MG (65 MG FE EQUIVALENT) 324 (65 FE) MG
324 TABLET DELAYED RESPONSE ORAL EVERY OTHER DAY
Qty: 90 TABLET | Refills: 1 | Status: SHIPPED | OUTPATIENT
Start: 2023-05-01

## 2023-05-01 NOTE — ASSESSMENT & PLAN NOTE
We just received the denial for the [facility]  I am waiting to find out if a PA will help  For now, continue metformin and Trulicity  Lab Results   Component Value Date    HGBA1C 6 8 (H) 04/17/2023

## 2023-05-01 NOTE — PROGRESS NOTES
Assessment/Plan:    Type 2 diabetes mellitus without complication, without long-term current use of insulin (HCC)  We just received the denial for the [facility]  I am waiting to find out if a PA will help  For now, continue metformin and Trulicity  Lab Results   Component Value Date    HGBA1C 6 8 (H) 04/17/2023       Anemia  Schedule colonoscopy  Continue ferrous sulfate    Obesity  Normal thyroid function test  Discussed that hormonal testing for menopause is not necessary since it will just confirm that she is in menopause  TAHBSO in 2000  She was on HRT after  She continues to get hot flashes  Also discussed pathophysiology of cortisol  I do not believe testing for this is necessary either at this point       Problem List Items Addressed This Visit        Endocrine    Type 2 diabetes mellitus without complication, without long-term current use of insulin ([address] ) - Primary     We just received the denial for the [facility]  I am waiting to find out if a PA will help  For now, continue metformin and Trulicity  Lab Results   Component Value Date    HGBA1C 6 8 (H) 04/17/2023               Other    Anemia     Schedule colonoscopy  Continue ferrous sulfate         Relevant Medications    ferrous sulfate 324 (65 Fe) mg    Obesity     Normal thyroid function test  Discussed that hormonal testing for menopause is not necessary since it will just confirm that she is in menopause  TAHBSO in 2000  She was on HRT after  She continues to get hot flashes  Also discussed pathophysiology of cortisol  I do not believe testing for this is necessary either at this point              Subjective:      Patient ID: Janes Kenyon is a 59 y o  female  HPI  Here for a follow up  Recent labs reviewed and A1C up to 6 8, LDL 65   Mounjaro request denied   She is on Trulicity and metformin  Diet has been poor especially with going back and forth to [place] to care for her mother  Hgb is normal at 12 and ferritin normal at 17  She is taking "oral iron every other day  Overdue for colonoscopy and has the # to schedule  C/o centripetal fat distribution even when she lost weight    The following portions of the patient's history were reviewed and updated as appropriate: allergies, current medications, past family history, past medical history, past social history, past surgical history and problem list     Review of Systems   Constitutional: Positive for unexpected weight change (weight gain)  Respiratory: Negative for shortness of breath  Cardiovascular: Negative for chest pain and palpitations  Gastrointestinal: Positive for abdominal distention  Negative for abdominal pain, constipation and diarrhea  Genitourinary: Negative for difficulty urinating  Neurological: Positive for headaches  Psychiatric/Behavioral: Negative for dysphoric mood  The patient is not nervous/anxious  Objective:      /80 (BP Location: Left arm, Patient Position: Sitting, Cuff Size: Standard)   Pulse 93   Ht 5' 4\" (1 626 m)   Wt 77 7 kg (171 lb 6 4 oz)   SpO2 99%   BMI 29 42 kg/m²          Physical Exam  Constitutional:       Appearance: She is well-developed  She is not ill-appearing  Eyes:      Conjunctiva/sclera: Conjunctivae normal    Cardiovascular:      Rate and Rhythm: Normal rate and regular rhythm  Heart sounds: Normal heart sounds  No murmur heard  Pulmonary:      Effort: Pulmonary effort is normal  No respiratory distress  Breath sounds: Normal breath sounds  No wheezing or rales  Abdominal:      General: There is no distension  Palpations: Abdomen is soft  There is no mass  Tenderness: There is no abdominal tenderness  There is no guarding or rebound  Musculoskeletal:      Cervical back: Neck supple  Neurological:      Mental Status: She is alert and oriented to person, place, and time  Psychiatric:         Mood and Affect: Mood normal          Behavior: Behavior normal          Thought Content:  Thought " content normal          Judgment: Judgment normal

## 2023-05-01 NOTE — ASSESSMENT & PLAN NOTE
Normal thyroid function test  Discussed that hormonal testing for menopause is not necessary since it will just confirm that she is in menopause  HECTOR in 2000  She was on HRT after  She continues to get hot flashes    Also discussed pathophysiology of cortisol  I do not believe testing for this is necessary either at this point

## 2023-05-09 DIAGNOSIS — F41.9 ANXIETY: ICD-10-CM

## 2023-05-09 RX ORDER — SERTRALINE HYDROCHLORIDE 25 MG/1
25 TABLET, FILM COATED ORAL DAILY
Qty: 30 TABLET | Refills: 0 | Status: SHIPPED | OUTPATIENT
Start: 2023-05-09 | End: 2023-06-08

## 2023-05-21 DIAGNOSIS — N20.0 STAGHORN RENAL CALCULUS: ICD-10-CM

## 2023-05-22 RX ORDER — POTASSIUM CITRATE 10 MEQ/1
TABLET, EXTENDED RELEASE ORAL
Qty: 120 TABLET | Refills: 6 | Status: SHIPPED | OUTPATIENT
Start: 2023-05-22

## 2023-06-03 DIAGNOSIS — E11.9 TYPE 2 DIABETES MELLITUS WITHOUT COMPLICATION, WITHOUT LONG-TERM CURRENT USE OF INSULIN (HCC): ICD-10-CM

## 2023-06-03 RX ORDER — DULAGLUTIDE 3 MG/.5ML
INJECTION, SOLUTION SUBCUTANEOUS
Qty: 2 ML | Refills: 1 | Status: SHIPPED | OUTPATIENT
Start: 2023-06-03 | End: 2023-06-07 | Stop reason: DRUGHIGH

## 2023-06-05 ENCOUNTER — PATIENT MESSAGE (OUTPATIENT)
Dept: INTERNAL MEDICINE CLINIC | Facility: CLINIC | Age: 58
End: 2023-06-05

## 2023-06-05 DIAGNOSIS — E11.9 TYPE 2 DIABETES MELLITUS WITHOUT COMPLICATION, WITHOUT LONG-TERM CURRENT USE OF INSULIN (HCC): Primary | ICD-10-CM

## 2023-06-05 DIAGNOSIS — F32.2 CURRENT SEVERE EPISODE OF MAJOR DEPRESSIVE DISORDER WITHOUT PSYCHOTIC FEATURES, UNSPECIFIED WHETHER RECURRENT (HCC): ICD-10-CM

## 2023-06-05 DIAGNOSIS — F90.0 ATTENTION DEFICIT HYPERACTIVITY DISORDER (ADHD), PREDOMINANTLY INATTENTIVE TYPE: Primary | ICD-10-CM

## 2023-06-05 RX ORDER — BUPROPION HYDROCHLORIDE 150 MG/1
150 TABLET ORAL EVERY MORNING
Qty: 30 TABLET | Refills: 5 | Status: SHIPPED | OUTPATIENT
Start: 2023-06-05 | End: 2023-12-02

## 2023-06-05 RX ORDER — BUPROPION HYDROCHLORIDE 300 MG/1
300 TABLET ORAL EVERY MORNING
Qty: 30 TABLET | Refills: 5 | Status: SHIPPED | OUTPATIENT
Start: 2023-06-05 | End: 2023-12-02

## 2023-06-05 NOTE — PATIENT COMMUNICATION
Pharmacist Tracking Tool  Reason For Outreach: Embedded Pharmacist  Demographics:  Intervention Method: Varoliit Message  Type of Intervention: Follow-Up  Topics Addressed: Diabetes  Pharmacologic Interventions: Dose or Frequency Adjusted, Prevent or Manage DEBORA and Med Rec  Non-Pharmacologic Interventions: Care coordination, Cost, Medication Monitoring and Medication/Device education  Time:  Direct Patient Care: 5 mins  Care Coordination: 15 mins  Recommendation Recipient: Patient/Caregiver  Outcome: Accepted

## 2023-06-07 NOTE — PATIENT COMMUNICATION
Sent 4 5 to pharmacy per patient request  Seeing PCP 8/22/23  Will send to [facility] to f/u on upon return       Pharmacist Tracking Tool  Reason For Outreach: Embedded Pharmacist  Demographics:  Intervention Method: [employer] Message  Type of Intervention: Follow-Up  Topics Addressed: Diabetes  Pharmacologic Interventions: Dose or Frequency Adjusted  Non-Pharmacologic Interventions: Care coordination  Time:  Direct Patient Care: 5 mins  Care Coordination: 5 mins  Recommendation Recipient: Patient/Caregiver  Outcome: Accepted

## 2023-06-15 DIAGNOSIS — E11.65 UNCONTROLLED TYPE 2 DIABETES MELLITUS WITH HYPERGLYCEMIA (HCC): ICD-10-CM

## 2023-06-15 RX ORDER — BLOOD SUGAR DIAGNOSTIC
1 STRIP MISCELLANEOUS 3 TIMES DAILY
Qty: 100 STRIP | Refills: 11 | Status: SHIPPED | OUTPATIENT
Start: 2023-06-15

## 2023-07-03 ENCOUNTER — PATIENT MESSAGE (OUTPATIENT)
Dept: INTERNAL MEDICINE CLINIC | Facility: CLINIC | Age: 58
End: 2023-07-03

## 2023-08-01 ENCOUNTER — TELEPHONE (OUTPATIENT)
Dept: INTERNAL MEDICINE CLINIC | Facility: CLINIC | Age: 58
End: 2023-08-01

## 2023-08-08 DIAGNOSIS — F41.9 ANXIETY: ICD-10-CM

## 2023-08-08 RX ORDER — SERTRALINE HYDROCHLORIDE 25 MG/1
25 TABLET, FILM COATED ORAL DAILY
Qty: 30 TABLET | Refills: 5 | Status: SHIPPED | OUTPATIENT
Start: 2023-08-08

## 2023-08-12 DIAGNOSIS — E11.9 TYPE 2 DIABETES MELLITUS WITHOUT COMPLICATION, WITHOUT LONG-TERM CURRENT USE OF INSULIN (HCC): ICD-10-CM

## 2023-08-12 RX ORDER — DULAGLUTIDE 4.5 MG/.5ML
INJECTION, SOLUTION SUBCUTANEOUS
Qty: 2 ML | Refills: 0 | Status: SHIPPED | OUTPATIENT
Start: 2023-08-12

## 2023-08-14 DIAGNOSIS — J30.9 ALLERGIC RHINITIS, UNSPECIFIED SEASONALITY, UNSPECIFIED TRIGGER: ICD-10-CM

## 2023-08-14 RX ORDER — MONTELUKAST SODIUM 10 MG/1
10 TABLET ORAL EVERY EVENING
Qty: 90 TABLET | Refills: 3 | Status: SHIPPED | OUTPATIENT
Start: 2023-08-14

## 2023-08-21 DIAGNOSIS — I10 BENIGN ESSENTIAL HYPERTENSION: ICD-10-CM

## 2023-08-21 DIAGNOSIS — E11.65 UNCONTROLLED TYPE 2 DIABETES MELLITUS WITH HYPERGLYCEMIA (HCC): ICD-10-CM

## 2023-08-21 RX ORDER — LISINOPRIL 5 MG/1
5 TABLET ORAL DAILY
Qty: 90 TABLET | Refills: 3 | Status: SHIPPED | OUTPATIENT
Start: 2023-08-21

## 2023-08-21 RX ORDER — METFORMIN HYDROCHLORIDE 500 MG/1
1000 TABLET, EXTENDED RELEASE ORAL 2 TIMES DAILY WITH MEALS
Qty: 360 TABLET | Refills: 3 | Status: SHIPPED | OUTPATIENT
Start: 2023-08-21

## 2023-08-22 ENCOUNTER — OFFICE VISIT (OUTPATIENT)
Dept: INTERNAL MEDICINE CLINIC | Facility: CLINIC | Age: 58
End: 2023-08-22
Payer: MEDICARE

## 2023-08-22 VITALS
DIASTOLIC BLOOD PRESSURE: 84 MMHG | WEIGHT: 162.2 LBS | HEIGHT: 64 IN | SYSTOLIC BLOOD PRESSURE: 126 MMHG | HEART RATE: 99 BPM | OXYGEN SATURATION: 99 % | BODY MASS INDEX: 27.69 KG/M2

## 2023-08-22 DIAGNOSIS — G43.109 MIGRAINE WITH AURA AND WITHOUT STATUS MIGRAINOSUS, NOT INTRACTABLE: ICD-10-CM

## 2023-08-22 DIAGNOSIS — E11.9 TYPE 2 DIABETES MELLITUS WITHOUT COMPLICATION, WITHOUT LONG-TERM CURRENT USE OF INSULIN (HCC): Primary | ICD-10-CM

## 2023-08-22 PROCEDURE — 99214 OFFICE O/P EST MOD 30 MIN: CPT | Performed by: INTERNAL MEDICINE

## 2023-08-22 NOTE — PROGRESS NOTES
Assessment/Plan:    Type 2 diabetes mellitus without complication, without long-term current use of insulin ([address])  Will try Ozempic and switch from Trulicity for more weight loss and better A1C control  Lab Results   Component Value Date    HGBA1C 6.8 (H) 04/17/2023       Migraine with aura and without status migrainosus, not intractable  Limit use of Exedrin   Willing to try magnesium supplement  Declined neuro referral  Recalls trying propranolol in the past    Aware she is due for a colonoscopy and needs to schedule  BMI Counseling: Body mass index is 27.84 kg/m². The BMI is above normal. Nutrition recommendations include consuming healthier snacks and moderation in carbohydrate intake. Problem List Items Addressed This Visit        Endocrine    Type 2 diabetes mellitus without complication, without long-term current use of insulin ([address]) - Primary     Will try Ozempic and switch from Trulicity for more weight loss and better A1C control  Lab Results   Component Value Date    HGBA1C 6.8 (H) 04/17/2023            Relevant Medications    semaglutide, 2 mg/dose, (Ozempic) 8 mg/ mL injection pen    Other Relevant Orders    Comprehensive metabolic panel    CBC and differential    Albumin / creatinine urine ratio    Lipid Panel with Direct LDL reflex    Hemoglobin A1C       Cardiovascular and Mediastinum    Migraine with aura and without status migrainosus, not intractable     Limit use of Exedrin   Willing to try magnesium supplement  Declined neuro referral  Recalls trying propranolol in the past         Relevant Medications    Magnesium 400 MG CAPS         Subjective:      Patient ID: Ramesh Ryder is a 59 y.o. female. HPI  Here for a follow up  She is diabetic of Trulicity. Mandy Shahid was denied. Letter submitted to appeal this. Her insurance reached out to her and had a phone conference with her to determine her eligibility for this. She was denied in the end.    Diet has been inconsistent due to running back and forth to [place] to care for elderly parents    The following portions of the patient's history were reviewed and updated as appropriate: allergies, current medications, past family history, past medical history, past social history, past surgical history and problem list.    Review of Systems   Constitutional: Positive for unexpected weight change (weight loss). Respiratory: Negative for shortness of breath. Cardiovascular: Negative for chest pain and palpitations. Gastrointestinal: Positive for constipation. Negative for abdominal pain. Neurological: Positive for headaches (chronic and takes Excedrin migrain frequently). Negative for dizziness. Objective:      /84 (BP Location: Left arm, Patient Position: Sitting, Cuff Size: Standard)   Pulse 99   Ht 5' 4" (1.626 m)   Wt 73.6 kg (162 lb 3.2 oz)   SpO2 99%   BMI 27.84 kg/m²          Physical Exam  Constitutional:       General: She is not in acute distress. Appearance: She is well-developed. She is not ill-appearing, toxic-appearing or diaphoretic. Eyes:      Conjunctiva/sclera: Conjunctivae normal.   Cardiovascular:      Rate and Rhythm: Normal rate and regular rhythm. Heart sounds: Normal heart sounds. No murmur heard. Pulmonary:      Effort: Pulmonary effort is normal. No respiratory distress. Breath sounds: Normal breath sounds. No wheezing or rales. Abdominal:      General: There is no distension. Palpations: Abdomen is soft. There is no mass. Tenderness: There is no abdominal tenderness. There is no guarding or rebound. Musculoskeletal:      Cervical back: Neck supple. Right lower leg: No edema. Left lower leg: No edema. Neurological:      Mental Status: She is alert and oriented to person, place, and time. Psychiatric:         Behavior: Behavior normal.         Thought Content:  Thought content normal.         Judgment: Judgment normal.

## 2023-08-22 NOTE — ASSESSMENT & PLAN NOTE
Limit use of Exedrin   Willing to try magnesium supplement  Declined neuro referral  Recalls trying propranolol in the past

## 2023-08-22 NOTE — ASSESSMENT & PLAN NOTE
Will try Ozempic and switch from Trulicity for more weight loss and better A1C control  Lab Results   Component Value Date    HGBA1C 6.8 (H) 04/17/2023

## 2023-08-24 DIAGNOSIS — E11.9 TYPE 2 DIABETES MELLITUS WITHOUT COMPLICATION, WITHOUT LONG-TERM CURRENT USE OF INSULIN (HCC): ICD-10-CM

## 2023-08-24 RX ORDER — SEMAGLUTIDE 2.68 MG/ML
INJECTION, SOLUTION SUBCUTANEOUS
Qty: 9 ML | Refills: 1 | Status: SHIPPED | OUTPATIENT
Start: 2023-08-24 | End: 2023-08-24 | Stop reason: CLARIF

## 2023-09-19 DIAGNOSIS — E11.65 UNCONTROLLED TYPE 2 DIABETES MELLITUS WITH HYPERGLYCEMIA (HCC): ICD-10-CM

## 2023-09-19 RX ORDER — LANCETS 33 GAUGE
EACH MISCELLANEOUS
Qty: 300 EACH | Refills: 3 | Status: SHIPPED | OUTPATIENT
Start: 2023-09-19

## 2023-11-11 DIAGNOSIS — E78.2 MIXED HYPERLIPIDEMIA: ICD-10-CM

## 2023-11-11 DIAGNOSIS — E11.9 TYPE 2 DIABETES MELLITUS WITHOUT COMPLICATION, WITHOUT LONG-TERM CURRENT USE OF INSULIN (HCC): ICD-10-CM

## 2023-11-13 RX ORDER — ROSUVASTATIN CALCIUM 10 MG/1
10 TABLET, COATED ORAL DAILY
Qty: 90 TABLET | Refills: 1 | Status: SHIPPED | OUTPATIENT
Start: 2023-11-13

## 2023-11-16 ENCOUNTER — HOSPITAL ENCOUNTER (OUTPATIENT)
Dept: RADIOLOGY | Facility: HOSPITAL | Age: 58
End: 2023-11-16
Payer: MEDICARE

## 2023-11-16 DIAGNOSIS — N20.0 KIDNEY STONE: ICD-10-CM

## 2023-11-16 PROCEDURE — 74018 RADEX ABDOMEN 1 VIEW: CPT

## 2023-12-05 ENCOUNTER — OFFICE VISIT (OUTPATIENT)
Dept: UROLOGY | Facility: CLINIC | Age: 58
End: 2023-12-05
Payer: MEDICARE

## 2023-12-05 VITALS
HEIGHT: 64 IN | DIASTOLIC BLOOD PRESSURE: 80 MMHG | OXYGEN SATURATION: 99 % | HEART RATE: 99 BPM | BODY MASS INDEX: 29.02 KG/M2 | WEIGHT: 170 LBS | SYSTOLIC BLOOD PRESSURE: 132 MMHG

## 2023-12-05 DIAGNOSIS — N20.0 STAGHORN RENAL CALCULUS: ICD-10-CM

## 2023-12-05 DIAGNOSIS — N20.0 KIDNEY STONE: Primary | ICD-10-CM

## 2023-12-05 LAB
BACTERIA UR QL AUTO: ABNORMAL /HPF
BILIRUB UR QL STRIP: NEGATIVE
CLARITY UR: CLEAR
COLOR UR: YELLOW
GLUCOSE UR STRIP-MCNC: NEGATIVE MG/DL
HGB UR QL STRIP.AUTO: NEGATIVE
HYALINE CASTS #/AREA URNS LPF: ABNORMAL /LPF
KETONES UR STRIP-MCNC: ABNORMAL MG/DL
LEUKOCYTE ESTERASE UR QL STRIP: ABNORMAL
MUCOUS THREADS UR QL AUTO: ABNORMAL
NITRITE UR QL STRIP: POSITIVE
NON-SQ EPI CELLS URNS QL MICRO: ABNORMAL /HPF
PH UR STRIP.AUTO: 5.5 [PH]
PROT UR STRIP-MCNC: ABNORMAL MG/DL
RBC #/AREA URNS AUTO: ABNORMAL /HPF
SL AMB  POCT GLUCOSE, UA: NORMAL
SL AMB LEUKOCYTE ESTERASE,UA: NORMAL
SL AMB POCT BILIRUBIN,UA: NORMAL
SL AMB POCT BLOOD,UA: NORMAL
SL AMB POCT CLARITY,UA: CLEAR
SL AMB POCT COLOR,UA: YELLOW
SL AMB POCT KETONES,UA: NORMAL
SL AMB POCT NITRITE,UA: NORMAL
SL AMB POCT PH,UA: 5
SL AMB POCT SPECIFIC GRAVITY,UA: 1.03
SL AMB POCT URINE PROTEIN: NORMAL
SL AMB POCT UROBILINOGEN: 0.2
SP GR UR STRIP.AUTO: 1.03 (ref 1–1.03)
UROBILINOGEN UR STRIP-ACNC: <2 MG/DL
WBC #/AREA URNS AUTO: ABNORMAL /HPF

## 2023-12-05 PROCEDURE — 81001 URINALYSIS AUTO W/SCOPE: CPT | Performed by: PHYSICIAN ASSISTANT

## 2023-12-05 PROCEDURE — 81002 URINALYSIS NONAUTO W/O SCOPE: CPT | Performed by: PHYSICIAN ASSISTANT

## 2023-12-05 PROCEDURE — 99213 OFFICE O/P EST LOW 20 MIN: CPT | Performed by: PHYSICIAN ASSISTANT

## 2023-12-05 RX ORDER — POTASSIUM CITRATE 10 MEQ/1
10 TABLET, EXTENDED RELEASE ORAL 2 TIMES DAILY
Qty: 180 TABLET | Refills: 3 | Status: SHIPPED | OUTPATIENT
Start: 2023-12-05

## 2023-12-05 NOTE — PROGRESS NOTES
1. Kidney stone  POCT urine dip    US kidney and bladder    Basic metabolic panel    Urinalysis with microscopic      2. Staghorn renal calculus  potassium citrate (UROCIT-K) 10 mEq    Urinalysis with microscopic          Assessment and plan:       1. Kidney stones  - s/p left PCNL 2022  - KUB negative, however stones were mixed uric acid and calcium oxalate  - on potassium citrate BID  - asymptomatic at this time - f/u 1 year US prior  - if symptoms return, will update a CT          Moira Vega      Chief Complaint     Kidney stones    History of Present Illness     Miguelito Archer is a 59 y.o. female patient  with a history of staghorn calculus presenting for follow-up. History of a left staghorn calculus. Elected for left PCNL. Left PCNU placed 3/1/22 with IR. PCNL 3/7/22 followed by repeat PCNL 4/20/22. Underwent stent and nephrostomy tube removal 5/5/22. Did experience right flank pain for 2 weeks prior to KUB. It has since subsided. KUB (11/16/23) Faint possible calcification overlying the right renal shadow may represent a small renal calculus. Urine dip is leukocyte, nitrite, blood negative. Laboratory     Lab Results   Component Value Date    CREATININE 0.82 04/17/2023     Review of Systems     Review of Systems   Constitutional:  Negative for activity change, appetite change, chills, diaphoresis, fatigue, fever and unexpected weight change. Respiratory:  Negative for chest tightness and shortness of breath. Cardiovascular:  Negative for chest pain, palpitations and leg swelling. Gastrointestinal:  Negative for abdominal distention, abdominal pain, constipation, diarrhea, nausea and vomiting. Genitourinary:  Negative for decreased urine volume, difficulty urinating, dysuria, enuresis, flank pain, frequency, genital sores, hematuria and urgency. Musculoskeletal:  Negative for back pain, gait problem and myalgias.    Skin:  Negative for color change, pallor, rash and wound.   Psychiatric/Behavioral:  Negative for behavioral problems. The patient is not nervous/anxious. Allergies     Allergies   Allergen Reactions    Seasonal Ic  [Cholestatin] Allergic Rhinitis       Physical Exam     Physical Exam  Constitutional:       General: She is not in acute distress. Appearance: Normal appearance. She is normal weight. She is not ill-appearing, toxic-appearing or diaphoretic. HENT:      Head: Normocephalic and atraumatic. Eyes:      General:         Right eye: No discharge. Left eye: No discharge. Conjunctiva/sclera: Conjunctivae normal.   Pulmonary:      Effort: Pulmonary effort is normal. No respiratory distress. Musculoskeletal:         General: No swelling or tenderness. Normal range of motion. Skin:     General: Skin is warm and dry. Coloration: Skin is not jaundiced or pale. Neurological:      General: No focal deficit present. Mental Status: She is alert and oriented to person, place, and time. Psychiatric:         Mood and Affect: Mood normal.         Behavior: Behavior normal.         Thought Content:  Thought content normal.           Vital Signs     Vitals:    12/05/23 1021   BP: 132/80   BP Location: Left arm   Patient Position: Sitting   Cuff Size: Adult   Pulse: 99   SpO2: 99%   Weight: 77.1 kg (170 lb)   Height: 5' 4" (1.626 m)         Current Medications       Current Outpatient Medications:     albuterol (ProAir HFA) 90 mcg/act inhaler, Inhale 1-2 puffs every 4 (four) hours as needed for wheezing, Disp: 18 g, Rfl: 3    aspirin 81 MG tablet, Take 1 tablet by mouth daily, Disp: , Rfl:     cholecalciferol (VITAMIN D3) 1,000 units tablet, Take 1,000 Units by mouth daily, Disp: , Rfl:     ferrous sulfate 324 (65 Fe) mg, Take 1 tablet (324 mg total) by mouth every other day, Disp: 90 tablet, Rfl: 1    Lancets (OneTouch Delica Plus [id number]) MISC, USE TO TEST 3 TIMES A DAY, Disp: 300 each, Rfl: 3    lisinopril (ZESTRIL) 5 mg tablet, TAKE 1 TABLET (5 MG TOTAL) BY MOUTH DAILY. , Disp: 90 tablet, Rfl: 3    Magnesium 400 MG CAPS, Take 1 capsule (400 mg total) by mouth in the morning, Disp: , Rfl:     metFORMIN (GLUCOPHAGE-XR) 500 mg 24 hr tablet, TAKE 2 TABLETS BY MOUTH TWICE A DAY WITH FOOD, Disp: 360 tablet, Rfl: 3    montelukast (SINGULAIR) 10 mg tablet, TAKE 1 TABLET BY MOUTH EVERY DAY IN THE EVENING, Disp: 90 tablet, Rfl: 3    OneTouch Ultra test strip, USE 1 EACH 3 (THREE) TIMES A DAY, Disp: 100 strip, Rfl: 11    potassium citrate (UROCIT-K) 10 mEq, Take 1 tablet (10 mEq total) by mouth 2 (two) times a day, Disp: 180 tablet, Rfl: 3    rosuvastatin (CRESTOR) 10 MG tablet, TAKE 1 TABLET BY MOUTH EVERY DAY, Disp: 90 tablet, Rfl: 1    semaglutide, 2 mg/dose, (Ozempic, 2 MG/DOSE,) 8 mg/ mL injection pen, Inject 0.75 mL (2 mg total) under the skin every 7 days, Disp: 9 mL, Rfl: 1    sertraline (ZOLOFT) 25 mg tablet, Take 1 tablet (25 mg total) by mouth daily, Disp: 30 tablet, Rfl: 5    buPROPion (WELLBUTRIN XL) 150 mg 24 hr tablet, Take 1 tablet (150 mg total) by mouth every morning, Disp: 30 tablet, Rfl: 5    buPROPion (WELLBUTRIN XL) 300 mg 24 hr tablet, Take 1 tablet (300 mg total) by mouth every morning, Disp: 30 tablet, Rfl: 5      Active Problems     Patient Active Problem List   Diagnosis    Benign essential hypertension    Hyperlipidemia    Type 2 diabetes mellitus without complication, without long-term current use of insulin ([facility])    Kidney stone    Mild persistent asthma without complication    Allergic rhinitis    Anemia    Staghorn renal calculus    Incomplete emptying of bladder    Restrictive airway disease    Uric acid nephrolithiasis    Elevated erythrocyte sedimentation rate    Hematuria, microscopic    Hot flashes due to menopause    Irritable    Irritable bowel syndrome    Moderate obstructive sleep apnea    Obesity    Urge and stress incontinence    Current severe episode of major depressive disorder without psychotic features [facility])    Attention deficit hyperactivity disorder (ADHD), predominantly inattentive type    Migraine with aura and without status migrainosus, not intractable         Past Medical History     Past Medical History:   Diagnosis Date    Abnormal LFTs (liver function tests)     last assessed 14    Anemia     last assessed 14    Anesthesia complication     Restrictive airway diesease- has asthma attack when extubated    Asthma     Asthma 2013    Colon polyp     COVID-19 virus infection 2022    Diabetes mellitus ([address])     Diabetes mellitus due to underlying condition with hyperglycemia, without long-term current use of insulin ([address]) 2020    Elevated blood pressure reading     last assessed 13    GERD (gastroesophageal reflux disease)     Hematuria     last assessed 14    Hypercalcemia     last assessed 14    Hyperlipidemia     Kidney stone     Leukocytosis     last assessed     Microalbuminuria     last assessed 14    Paresthesia 2017    Reactive airway disease     Restrictive lung disease     Skin rash 2017    Snoring 2013    Type 2 diabetes mellitus with hyperglycemia ([address]) 2013    Type 2 diabetes mellitus without complication, without long-term current use of insulin ([address]) 2020    Uncontrolled type 2 diabetes mellitus with hyperglycemia ([address]) 2021    UTI (urinary tract infection) 2017         Surgical History     Past Surgical History:   Procedure Laterality Date     SECTION      FL RETROGRADE PYELOGRAM  3/7/2022    FL RETROGRADE PYELOGRAM  2022    HYSTERECTOMY      age 37 per [facility]    IR NEPHROSTOMY TUBE PLACEMENT  2022    IR NEPHROURETERAL ACCESS FOR UROLOGY PCNL  3/1/2022    OOPHORECTOMY Bilateral     age 37 per [facility], with hysterectomy    OR PERQ NL/PL LITHOTRP COMPLEX >2 CM MLT LOCATIONS Left 3/7/2022    Procedure: NEPHROLITHOTOMY  PERCUTANEOUS (PCNL);   Surgeon: Jena Brannon MD;  Location: [facility]; Service: Urology    [place] PERQ NL/PL LITHOTRP SIMPLE UP TO 2 CM 1 LOCATION Left 4/20/2022    Procedure: NEPHROLITHOTOMY  PERCUTANEOUS (PCNL);   Surgeon: Joanna Cedeno MD;  Location: [facility];  Service: Urology    TOTAL ABDOMINAL HYSTERECTOMY W/ BILATERAL SALPINGOOPHORECTOMY           Family History     Family History   Problem Relation Age of Onset    Dementia Mother     Parkinsonism Mother     Spinal muscular atrophy Mother     Diabetes Father         borderline    Melanoma Father     Anxiety disorder Sister     No Known Problems Daughter     No Known Problems Maternal Grandmother     No Known Problems Maternal Grandfather     No Known Problems Paternal Grandmother     No Known Problems Paternal Grandfather     No Known Problems Maternal Aunt     No Known Problems Paternal Aunt          Social History     Social History       Radiology

## 2023-12-06 DIAGNOSIS — N39.0 RECURRENT UTI: Primary | ICD-10-CM

## 2023-12-06 RX ORDER — SULFAMETHOXAZOLE AND TRIMETHOPRIM 800; 160 MG/1; MG/1
1 TABLET ORAL 2 TIMES DAILY
Qty: 14 TABLET | Refills: 0 | Status: SHIPPED | OUTPATIENT
Start: 2023-12-06 | End: 2023-12-13

## 2023-12-07 ENCOUNTER — TELEPHONE (OUTPATIENT)
Dept: UROLOGY | Facility: CLINIC | Age: 58
End: 2023-12-07

## 2023-12-07 NOTE — TELEPHONE ENCOUNTER
----- Message from Don Rao PA-C sent at 12/6/2023  7:29 AM EST -----  Please let the patient know urine is concerning for infection - bactrim sent.

## 2023-12-07 NOTE — TELEPHONE ENCOUNTER
I called patient and left a message for her to call the office back. **WHEN PATIENT CALL BACK PLEASE RELAY MESSAGE:    Roque Eric PA-C  12/6/2023  7:29 AM EST Back to Top      Please let the patient know urine is concerning for infection - bactrim sent.

## 2023-12-07 NOTE — TELEPHONE ENCOUNTER
----- Message from Mark Stapleton PA-C sent at 12/6/2023  7:29 AM EST -----  Please let the patient know urine is concerning for infection - bactrim sent.

## 2023-12-14 DIAGNOSIS — F90.0 ATTENTION DEFICIT HYPERACTIVITY DISORDER (ADHD), PREDOMINANTLY INATTENTIVE TYPE: ICD-10-CM

## 2023-12-14 DIAGNOSIS — F32.2 CURRENT SEVERE EPISODE OF MAJOR DEPRESSIVE DISORDER WITHOUT PSYCHOTIC FEATURES, UNSPECIFIED WHETHER RECURRENT (HCC): ICD-10-CM

## 2023-12-14 RX ORDER — BUPROPION HYDROCHLORIDE 300 MG/1
300 TABLET ORAL EVERY MORNING
Qty: 30 TABLET | Refills: 5 | Status: SHIPPED | OUTPATIENT
Start: 2023-12-14 | End: 2024-06-11

## 2023-12-14 RX ORDER — BUPROPION HYDROCHLORIDE 150 MG/1
150 TABLET ORAL EVERY MORNING
Qty: 30 TABLET | Refills: 5 | Status: SHIPPED | OUTPATIENT
Start: 2023-12-14

## 2023-12-27 ENCOUNTER — HOSPITAL ENCOUNTER (OUTPATIENT)
Dept: MAMMOGRAPHY | Facility: HOSPITAL | Age: 58
Discharge: HOME | End: 2023-12-27
Payer: MEDICARE

## 2023-12-27 VITALS — WEIGHT: 170 LBS | BODY MASS INDEX: 29.02 KG/M2 | HEIGHT: 64 IN

## 2023-12-27 DIAGNOSIS — Z12.31 VISIT FOR SCREENING MAMMOGRAM: ICD-10-CM

## 2023-12-27 PROCEDURE — 77067 SCR MAMMO BI INCL CAD: CPT

## 2023-12-27 PROCEDURE — 77063 BREAST TOMOSYNTHESIS BI: CPT

## 2024-01-29 ENCOUNTER — TELEPHONE (OUTPATIENT)
Age: 59
End: 2024-01-29

## 2024-01-29 DIAGNOSIS — N39.0 RECURRENT UTI: Primary | ICD-10-CM

## 2024-01-29 RX ORDER — CEPHALEXIN 500 MG/1
500 CAPSULE ORAL EVERY 12 HOURS SCHEDULED
Qty: 20 CAPSULE | Refills: 0 | Status: SHIPPED | OUTPATIENT
Start: 2024-01-29 | End: 2024-02-08

## 2024-01-29 NOTE — TELEPHONE ENCOUNTER
Keflex sent to pharmacy. Patient to try conservative measures with hydration and avoidance of bladder irritants. If no benefit, then can start antibiotic. Thanks

## 2024-01-29 NOTE — TELEPHONE ENCOUNTER
Pt called stated that she has UTI symptoms. Pt has hx of UTI    Pt is traveling to [place] tomorrow morning and requested abx to start for her symptoms.     Pt stated that urine is dark, cloudy, and has odor. Pt has burning and frequency.     Please review

## 2024-02-13 DIAGNOSIS — F41.9 ANXIETY: ICD-10-CM

## 2024-02-13 RX ORDER — SERTRALINE HYDROCHLORIDE 25 MG/1
25 TABLET, FILM COATED ORAL DAILY
Qty: 30 TABLET | Refills: 5 | Status: SHIPPED | OUTPATIENT
Start: 2024-02-13

## 2024-05-26 DIAGNOSIS — E11.9 TYPE 2 DIABETES MELLITUS WITHOUT COMPLICATION, WITHOUT LONG-TERM CURRENT USE OF INSULIN (HCC): ICD-10-CM

## 2024-05-28 DIAGNOSIS — E11.9 TYPE 2 DIABETES MELLITUS WITHOUT COMPLICATION, WITHOUT LONG-TERM CURRENT USE OF INSULIN (HCC): Primary | ICD-10-CM

## 2024-05-28 RX ORDER — SEMAGLUTIDE 2.68 MG/ML
INJECTION, SOLUTION SUBCUTANEOUS
Qty: 3 ML | Refills: 1 | Status: SHIPPED | OUTPATIENT
Start: 2024-05-28

## 2024-05-28 NOTE — TELEPHONE ENCOUNTER
Refill must be reviewed and completed by the office or provider. The refill is unable to be approved or denied by the medication management team.    Patient has not had hba1c since 10/14/22, patient seen 9 months ago was to rtn for r/u in 3 months

## 2024-05-30 ENCOUNTER — TELEPHONE (OUTPATIENT)
Age: 59
End: 2024-05-30

## 2024-05-30 NOTE — TELEPHONE ENCOUNTER
Pt requested to schedule an appt to see Cayla zambrano. Pt would like the appt to be on the same day as appt with PCP.  Please contact pt and advise. Thank you for your kind assistance.

## 2024-06-21 DIAGNOSIS — F90.0 ATTENTION DEFICIT HYPERACTIVITY DISORDER (ADHD), PREDOMINANTLY INATTENTIVE TYPE: ICD-10-CM

## 2024-06-21 DIAGNOSIS — F32.2 CURRENT SEVERE EPISODE OF MAJOR DEPRESSIVE DISORDER WITHOUT PSYCHOTIC FEATURES, UNSPECIFIED WHETHER RECURRENT (HCC): ICD-10-CM

## 2024-06-21 RX ORDER — BUPROPION HYDROCHLORIDE 150 MG/1
150 TABLET ORAL EVERY MORNING
Qty: 30 TABLET | Refills: 5 | Status: SHIPPED | OUTPATIENT
Start: 2024-06-21

## 2024-06-21 RX ORDER — BUPROPION HYDROCHLORIDE 300 MG/1
300 TABLET ORAL EVERY MORNING
Qty: 30 TABLET | Refills: 5 | Status: SHIPPED | OUTPATIENT
Start: 2024-06-21 | End: 2024-12-18

## 2024-06-23 DIAGNOSIS — E11.65 UNCONTROLLED TYPE 2 DIABETES MELLITUS WITH HYPERGLYCEMIA (HCC): ICD-10-CM

## 2024-06-23 RX ORDER — BLOOD SUGAR DIAGNOSTIC
STRIP MISCELLANEOUS
Qty: 100 STRIP | Refills: 5 | Status: SHIPPED | OUTPATIENT
Start: 2024-06-23

## 2024-07-19 DIAGNOSIS — E11.9 TYPE 2 DIABETES MELLITUS WITHOUT COMPLICATION, WITHOUT LONG-TERM CURRENT USE OF INSULIN (HCC): ICD-10-CM

## 2024-07-19 RX ORDER — SEMAGLUTIDE 2.68 MG/ML
INJECTION, SOLUTION SUBCUTANEOUS
Qty: 3 ML | Refills: 0 | Status: SHIPPED | OUTPATIENT
Start: 2024-07-19

## 2024-08-07 DIAGNOSIS — E11.9 TYPE 2 DIABETES MELLITUS WITHOUT COMPLICATION, WITHOUT LONG-TERM CURRENT USE OF INSULIN (HCC): ICD-10-CM

## 2024-08-07 DIAGNOSIS — E78.2 MIXED HYPERLIPIDEMIA: ICD-10-CM

## 2024-08-07 RX ORDER — ROSUVASTATIN CALCIUM 10 MG/1
10 TABLET, COATED ORAL DAILY
Qty: 30 TABLET | Refills: 5 | Status: SHIPPED | OUTPATIENT
Start: 2024-08-07

## 2024-08-20 DIAGNOSIS — F41.9 ANXIETY: ICD-10-CM

## 2024-08-20 RX ORDER — SERTRALINE HYDROCHLORIDE 25 MG/1
25 TABLET, FILM COATED ORAL DAILY
Qty: 30 TABLET | Refills: 0 | Status: SHIPPED | OUTPATIENT
Start: 2024-08-20

## 2024-08-21 DIAGNOSIS — E11.9 TYPE 2 DIABETES MELLITUS WITHOUT COMPLICATION, WITHOUT LONG-TERM CURRENT USE OF INSULIN (HCC): ICD-10-CM

## 2024-08-21 RX ORDER — SEMAGLUTIDE 2.68 MG/ML
INJECTION, SOLUTION SUBCUTANEOUS
Qty: 3 ML | Refills: 11 | Status: SHIPPED | OUTPATIENT
Start: 2024-08-21

## 2024-08-28 ENCOUNTER — APPOINTMENT (OUTPATIENT)
Dept: LAB | Facility: HOSPITAL | Age: 59
End: 2024-08-28
Payer: MEDICARE

## 2024-08-28 DIAGNOSIS — E11.9 TYPE 2 DIABETES MELLITUS WITHOUT COMPLICATION, WITHOUT LONG-TERM CURRENT USE OF INSULIN (HCC): ICD-10-CM

## 2024-08-28 LAB
ALBUMIN SERPL BCG-MCNC: 4.5 G/DL (ref 3.5–5)
ALP SERPL-CCNC: 89 U/L (ref 34–104)
ALT SERPL W P-5'-P-CCNC: 17 U/L (ref 7–52)
ANION GAP SERPL CALCULATED.3IONS-SCNC: 13 MMOL/L (ref 4–13)
AST SERPL W P-5'-P-CCNC: 15 U/L (ref 13–39)
BASOPHILS # BLD AUTO: 0.04 THOUSANDS/ÂΜL (ref 0–0.1)
BASOPHILS NFR BLD AUTO: 0 % (ref 0–1)
BILIRUB SERPL-MCNC: 0.22 MG/DL (ref 0.2–1)
BUN SERPL-MCNC: 20 MG/DL (ref 5–25)
CALCIUM SERPL-MCNC: 10.4 MG/DL (ref 8.4–10.2)
CHLORIDE SERPL-SCNC: 100 MMOL/L (ref 96–108)
CHOLEST SERPL-MCNC: 138 MG/DL
CO2 SERPL-SCNC: 28 MMOL/L (ref 21–32)
CREAT SERPL-MCNC: 0.89 MG/DL (ref 0.6–1.3)
CREAT UR-MCNC: 135.3 MG/DL
EOSINOPHIL # BLD AUTO: 0.14 THOUSAND/ÂΜL (ref 0–0.61)
EOSINOPHIL NFR BLD AUTO: 1 % (ref 0–6)
ERYTHROCYTE [DISTWIDTH] IN BLOOD BY AUTOMATED COUNT: 13.3 % (ref 11.6–15.1)
EST. AVERAGE GLUCOSE BLD GHB EST-MCNC: 137 MG/DL
GFR SERPL CREATININE-BSD FRML MDRD: 70 ML/MIN/1.73SQ M
GLUCOSE P FAST SERPL-MCNC: 128 MG/DL (ref 65–99)
HBA1C MFR BLD: 6.4 %
HCT VFR BLD AUTO: 36.1 % (ref 34.8–46.1)
HDLC SERPL-MCNC: 52 MG/DL
HGB BLD-MCNC: 11.2 G/DL (ref 11.5–15.4)
IMM GRANULOCYTES # BLD AUTO: 0.05 THOUSAND/UL (ref 0–0.2)
IMM GRANULOCYTES NFR BLD AUTO: 1 % (ref 0–2)
LDLC SERPL CALC-MCNC: 40 MG/DL (ref 0–100)
LYMPHOCYTES # BLD AUTO: 3.68 THOUSANDS/ÂΜL (ref 0.6–4.47)
LYMPHOCYTES NFR BLD AUTO: 35 % (ref 14–44)
MCH RBC QN AUTO: 27.1 PG (ref 26.8–34.3)
MCHC RBC AUTO-ENTMCNC: 31 G/DL (ref 31.4–37.4)
MCV RBC AUTO: 87 FL (ref 82–98)
MICROALBUMIN UR-MCNC: 7.8 MG/L
MICROALBUMIN/CREAT 24H UR: 6 MG/G CREATININE (ref 0–30)
MONOCYTES # BLD AUTO: 0.69 THOUSAND/ÂΜL (ref 0.17–1.22)
MONOCYTES NFR BLD AUTO: 7 % (ref 4–12)
NEUTROPHILS # BLD AUTO: 5.81 THOUSANDS/ÂΜL (ref 1.85–7.62)
NEUTS SEG NFR BLD AUTO: 56 % (ref 43–75)
NRBC BLD AUTO-RTO: 0 /100 WBCS
PLATELET # BLD AUTO: 347 THOUSANDS/UL (ref 149–390)
PMV BLD AUTO: 9.4 FL (ref 8.9–12.7)
POTASSIUM SERPL-SCNC: 4.7 MMOL/L (ref 3.5–5.3)
PROT SERPL-MCNC: 7.1 G/DL (ref 6.4–8.4)
RBC # BLD AUTO: 4.14 MILLION/UL (ref 3.81–5.12)
SODIUM SERPL-SCNC: 141 MMOL/L (ref 135–147)
TRIGL SERPL-MCNC: 230 MG/DL
TSH SERPL DL<=0.05 MIU/L-ACNC: 1.86 UIU/ML (ref 0.45–4.5)
WBC # BLD AUTO: 10.41 THOUSAND/UL (ref 4.31–10.16)

## 2024-08-28 PROCEDURE — 36415 COLL VENOUS BLD VENIPUNCTURE: CPT

## 2024-08-28 PROCEDURE — 83036 HEMOGLOBIN GLYCOSYLATED A1C: CPT

## 2024-08-28 PROCEDURE — 80061 LIPID PANEL: CPT

## 2024-08-28 PROCEDURE — 82570 ASSAY OF URINE CREATININE: CPT

## 2024-08-28 PROCEDURE — 80053 COMPREHEN METABOLIC PANEL: CPT

## 2024-08-28 PROCEDURE — 84443 ASSAY THYROID STIM HORMONE: CPT

## 2024-08-28 PROCEDURE — 82043 UR ALBUMIN QUANTITATIVE: CPT

## 2024-08-28 PROCEDURE — 85025 COMPLETE CBC W/AUTO DIFF WBC: CPT

## 2024-09-09 ENCOUNTER — OFFICE VISIT (OUTPATIENT)
Dept: INTERNAL MEDICINE CLINIC | Facility: CLINIC | Age: 59
End: 2024-09-09
Payer: MEDICARE

## 2024-09-09 ENCOUNTER — CLINICAL SUPPORT (OUTPATIENT)
Dept: INTERNAL MEDICINE CLINIC | Facility: CLINIC | Age: 59
End: 2024-09-09
Payer: MEDICARE

## 2024-09-09 VITALS
SYSTOLIC BLOOD PRESSURE: 128 MMHG | HEART RATE: 108 BPM | OXYGEN SATURATION: 99 % | HEIGHT: 64 IN | BODY MASS INDEX: 27.52 KG/M2 | DIASTOLIC BLOOD PRESSURE: 72 MMHG | WEIGHT: 161.2 LBS

## 2024-09-09 DIAGNOSIS — J45.30 MILD PERSISTENT ASTHMA WITHOUT COMPLICATION: ICD-10-CM

## 2024-09-09 DIAGNOSIS — Z00.00 ANNUAL PHYSICAL EXAM: Primary | ICD-10-CM

## 2024-09-09 DIAGNOSIS — R26.2 DIFFICULTY WALKING: ICD-10-CM

## 2024-09-09 DIAGNOSIS — E11.65 UNCONTROLLED TYPE 2 DIABETES MELLITUS WITH HYPERGLYCEMIA (HCC): ICD-10-CM

## 2024-09-09 DIAGNOSIS — Z23 ENCOUNTER FOR IMMUNIZATION: ICD-10-CM

## 2024-09-09 DIAGNOSIS — E11.9 TYPE 2 DIABETES MELLITUS WITHOUT COMPLICATION, WITHOUT LONG-TERM CURRENT USE OF INSULIN (HCC): Primary | ICD-10-CM

## 2024-09-09 DIAGNOSIS — F41.9 ANXIETY: ICD-10-CM

## 2024-09-09 DIAGNOSIS — K21.9 GASTROESOPHAGEAL REFLUX DISEASE, UNSPECIFIED WHETHER ESOPHAGITIS PRESENT: ICD-10-CM

## 2024-09-09 DIAGNOSIS — E55.9 VITAMIN D DEFICIENCY: ICD-10-CM

## 2024-09-09 DIAGNOSIS — E83.52 HYPERCALCEMIA: ICD-10-CM

## 2024-09-09 DIAGNOSIS — E11.9 TYPE 2 DIABETES MELLITUS WITHOUT COMPLICATION, WITHOUT LONG-TERM CURRENT USE OF INSULIN (HCC): ICD-10-CM

## 2024-09-09 DIAGNOSIS — E53.8 B12 DEFICIENCY: ICD-10-CM

## 2024-09-09 DIAGNOSIS — D50.8 IRON DEFICIENCY ANEMIA SECONDARY TO INADEQUATE DIETARY IRON INTAKE: ICD-10-CM

## 2024-09-09 DIAGNOSIS — Z12.11 COLON CANCER SCREENING: ICD-10-CM

## 2024-09-09 DIAGNOSIS — N95.1 MENOPAUSAL SYNDROME (HOT FLASHES): ICD-10-CM

## 2024-09-09 PROBLEM — D64.9 ANEMIA: Status: RESOLVED | Noted: 2021-08-05 | Resolved: 2024-09-09

## 2024-09-09 PROCEDURE — 90471 IMMUNIZATION ADMIN: CPT

## 2024-09-09 PROCEDURE — 99214 OFFICE O/P EST MOD 30 MIN: CPT | Performed by: INTERNAL MEDICINE

## 2024-09-09 PROCEDURE — 99396 PREV VISIT EST AGE 40-64: CPT | Performed by: INTERNAL MEDICINE

## 2024-09-09 PROCEDURE — 90715 TDAP VACCINE 7 YRS/> IM: CPT

## 2024-09-09 RX ORDER — METFORMIN HCL 500 MG
1000 TABLET, EXTENDED RELEASE 24 HR ORAL 2 TIMES DAILY WITH MEALS
Qty: 360 TABLET | Refills: 3 | Status: SHIPPED | OUTPATIENT
Start: 2024-09-09

## 2024-09-09 RX ORDER — LORAZEPAM 0.5 MG/1
TABLET ORAL
Qty: 1 TABLET | Refills: 0 | Status: SHIPPED | OUTPATIENT
Start: 2024-09-09

## 2024-09-09 RX ORDER — BUDESONIDE 90 UG/1
2 AEROSOL, POWDER RESPIRATORY (INHALATION) 2 TIMES DAILY
Qty: 1 EACH | Refills: 3 | Status: SHIPPED | OUTPATIENT
Start: 2024-09-09

## 2024-09-09 RX ORDER — OMEPRAZOLE 40 MG/1
40 CAPSULE, DELAYED RELEASE ORAL DAILY
Qty: 90 CAPSULE | Refills: 1 | Status: SHIPPED | OUTPATIENT
Start: 2024-09-09

## 2024-09-09 RX ORDER — FERROUS SULFATE 324(65)MG
324 TABLET, DELAYED RELEASE (ENTERIC COATED) ORAL DAILY
Qty: 90 TABLET | Refills: 1 | Status: SHIPPED | OUTPATIENT
Start: 2024-09-09

## 2024-09-09 RX ORDER — ALBUTEROL SULFATE 90 UG/1
1-2 AEROSOL, METERED RESPIRATORY (INHALATION) EVERY 4 HOURS PRN
Qty: 18 G | Refills: 3 | Status: SHIPPED | OUTPATIENT
Start: 2024-09-09

## 2024-09-09 NOTE — PROGRESS NOTES
[facility] Clinical [facility] Services  Viviana Clay, Pharmacist    Assessment/ Plan     1. Type 2 diabetes mellitus without complication, without long-term current use of insulin (HCC)  Assessment & Plan:    Lab Results   Component Value Date    HGBA1C 6.4 (H) 08/28/2024   No changes  2. Uncontrolled type 2 diabetes mellitus with hyperglycemia (HCC)  -     metFORMIN (GLUCOPHAGE-XR) 500 mg 24 hr tablet; Take 2 tablets (1,000 mg total) by mouth 2 (two) times a day with meals      Follow-up: prn    Subjective     Medication Adherence/ Tolerability/ Cost:  Patient denies side effects, no issues with cost, 0 missed doses in last two week  albuterol  aspirin  buPROPion  cholecalciferol  ferrous sulfate  lisinopril  LORazepam  Magnesium Caps  metFORMIN  montelukast  omeprazole  OneTouch Delica Plus [id number] Misc  OneTouch Ultra Strp  Ozempic (2 MG/DOSE) Sopn  potassium citrate  Pulmicort Flexhaler Aepb  rosuvastatin  sertraline      Objective       Blood Glucose Readings  The patient is currently checking blood glucose 1 times per day. Patient does not report with SMBG logs.    ASCVD Risk:  The 10-year ASCVD risk score (Ham DK, et al., 2019) is: 6.6%    Values used to calculate the score:      Age: 58 years      Sex: Female      Is Non- : No      Diabetic: Yes      Tobacco smoker: No      Systolic Blood Pressure: 128 mmHg      Is BP treated: Yes      HDL Cholesterol: 52 mg/dL      Total Cholesterol: 138 mg/dL     Vitals:  There were no vitals filed for this visit.    Labs:    Lab Results   Component Value Date    SODIUM 141 08/28/2024    K 4.7 08/28/2024    EGFR 70 08/28/2024    CREATININE 0.89 08/28/2024    GLUF 128 (H) 08/28/2024    [id number] 1,955 (H) 03/27/2023    MICROALBCRE 6 08/28/2024         Pharmacist Tracking Tool     Pharmacist Tracking Tool  Reason For Outreach: Embedded Pharmacist  Demographics:  Intervention Method: In Person  Type of Intervention: Follow-Up  Topics  Addressed: Diabetes  Pharmacologic Interventions: Prevent or Manage DEBORA and Med Rec  Non-Pharmacologic Interventions: Adherence addressed, Care coordination, Home Monitoring, and Labs  Time:  Direct Patient Care:  10  mins  Care Coordination:  10  mins  Recommendation Recipient: Patient/Caregiver  Outcome: Accepted

## 2024-09-09 NOTE — PROGRESS NOTES
Adult Annual Physical  Name: Gilberto Mesa      : 1965      MRN: [id number]  Encounter Provider: Lea Reyes, MD  Encounter Date: 2024   Encounter department: [facility]    Assessment & Plan   1. Annual physical exam  2. Encounter for immunization  -     TDAP VACCINE GREATER THAN OR EQUAL TO 6YO IM  3. Colon cancer screening  -     Ambulatory Referral to Gastroenterology; Future  4. Gastroesophageal reflux disease, unspecified whether esophagitis present  Assessment & Plan:  Reflux lately and taking 's omeprazole  Continue use   Schedule with GI, discuss EGD, due for colonoscopy  Orders:  -     Ambulatory Referral to Gastroenterology; Future  -     omeprazole (PriLOSEC) 40 MG capsule; Take 1 capsule (40 mg total) by mouth daily  5. Type 2 diabetes mellitus without complication, without long-term current use of insulin (HCC)  Assessment & Plan:    Lab Results   Component Value Date    HGBA1C 6.4 (H) 2024   Controlled on metformin and Ozempic  She is on a statin and ACE inh  Scheduled for eye exam at [facility] and report to be requested  6. Vitamin D deficiency  -     Vitamin B12  7. B12 deficiency  -     Vitamin D 25 hydroxy  8. Iron deficiency anemia secondary to inadequate dietary iron intake  -     Iron Panel (Includes Ferritin, Iron Sat%, Iron, and TIBC)  -     ferrous sulfate 324 (65 Fe) mg; Take 1 tablet (324 mg total) by mouth in the morning  9. Mild persistent asthma without complication  Assessment & Plan:  She has been needing Flovent and albuterol recently, refills needed  Switch to Pulmicort based on covered alternatives    Orders:  -     budesonide (Pulmicort Flexhaler) 90 MCG/ACT inhaler; Inhale 2 puffs 2 (two) times a day Rinse mouth after use.  -     albuterol (ProAir HFA) 90 mcg/act inhaler; Inhale 1-2 puffs every 4 (four) hours as needed for wheezing  10. Difficulty walking  Assessment & Plan:  A few months of poor balance, tremors  +  Romberg and abnormal tandem walk on exam  Recommend to obtain and MRI and have neurology consultation  Orders:  -     MRI brain wo contrast; Future; Expected date: 09/09/2024  -     Ambulatory Referral to Neurology; Future  11. Anxiety  -     LORazepam (Ativan) 0.5 mg tablet; Take right before MRI  12. Menopausal syndrome (hot flashes)  -     Ambulatory Referral to Obstetrics / Gynecology; Future  13. Hypercalcemia  -     Calcium; Future    Immunizations and preventive care screenings were discussed with patient today. Appropriate education was printed on patient's after visit summary.    Annual flu, COVID and RSV vaccines recommended    Counseling:  Continue diabetic diet         History of Present Illness     Adult Annual Physical:  Patient presents for annual physical. C/o poor balance, fear of falling, tremors right hand>left in the past few months  Tremors resting and positional  .     Diet and Physical Activity:  - Diet/Nutrition: diabetic diet.  - Exercise: walking.    General Health:  - Sleep: sleeps poorly and 4-6 hours of sleep on average.  - Hearing: normal hearing bilateral ears.  - Vision: wears glasses and goes for regular eye exams.  - Dental: regular dental visits.    /GYN Health:  - Follows with GYN: no.   - Menopause: postmenopausal.     Review of Systems   Constitutional:  Positive for fatigue (sleepy and tired for a few months) and unexpected weight change.   Respiratory:  Positive for chest tightness, shortness of breath and wheezing. Negative for cough.    Cardiovascular:  Negative for chest pain and palpitations.   Gastrointestinal:  Positive for constipation.   Endocrine:        Hot flashes   Genitourinary:  Negative for difficulty urinating.   Skin:         Small healing wound on the left great toe from stepping on the clam   Neurological:  Positive for tremors (hands right >left) and headaches (better with magnesium supplements).         Objective     /72 (BP Location: Left arm,  "Patient Position: Sitting, Cuff Size: Standard)   Pulse (!) 108   Ht 5' 4\" (1.626 m)   Wt 73.1 kg (161 lb 3.2 oz)   SpO2 99%   BMI 27.67 kg/m²     Physical Exam  Constitutional:       General: She is not in acute distress.     Appearance: She is well-developed. She is not ill-appearing, toxic-appearing or diaphoretic.   HENT:      Right Ear: External ear normal. There is no impacted cerumen.      Left Ear: External ear normal. There is no impacted cerumen.   Eyes:      Conjunctiva/sclera: Conjunctivae normal.   Cardiovascular:      Rate and Rhythm: Normal rate and regular rhythm.      Pulses: no weak pulses.           Dorsalis pedis pulses are 2+ on the right side and 2+ on the left side.      Heart sounds: Normal heart sounds. No murmur heard.  Pulmonary:      Effort: Pulmonary effort is normal. No respiratory distress.      Breath sounds: Normal breath sounds. No stridor. No wheezing or rales.   Abdominal:      General: There is no distension.      Palpations: Abdomen is soft. There is no mass.      Tenderness: There is no abdominal tenderness. There is no guarding or rebound.   Musculoskeletal:      Cervical back: Neck supple.      Right lower leg: No edema.      Left lower leg: No edema.        Feet:    Feet:      Right foot:      Skin integrity: No ulcer, skin breakdown, erythema, warmth, callus or dry skin.      Left foot:      Skin integrity: No ulcer, skin breakdown, erythema, warmth, callus or dry skin.   Neurological:      Mental Status: She is alert and oriented to person, place, and time.      Cranial Nerves: No facial asymmetry.      Motor: Tremor (positional in the right hand) present. No weakness.      Coordination: Romberg sign positive. Finger-Nose-Finger Test normal.      Gait: Tandem walk abnormal.      Comments: No bradykinesia   Psychiatric:         Mood and Affect: Mood normal.         Behavior: Behavior normal.         Thought Content: Thought content normal.         Judgment: Judgment " normal.     Patient's shoes and socks removed.    Right Foot/Ankle   Right Foot Inspection  Skin Exam: skin normal and skin intact. No dry skin, no warmth, no callus, no erythema, no maceration, no abnormal color, no pre-ulcer, no ulcer and no callus.     Toe Exam: ROM and strength within normal limits.     Sensory   Monofilament testing: intact    Vascular  The right DP pulse is 2+.     Left Foot/Ankle  Left Foot Inspection  Skin Exam: skin normal and skin intact. No dry skin, no warmth, no erythema, no maceration, normal color, no pre-ulcer, no ulcer and no callus.     Toe Exam: ROM and strength within normal limits.     Sensory   Monofilament testing: intact    Vascular  The left DP pulse is 2+.     Assign Risk Category  No deformity present  No loss of protective sensation  No weak pulses  Risk: 0

## 2024-09-09 NOTE — PATIENT INSTRUCTIONS
"Patient Education     Routine physical for adults   The Basics   Written by the doctors and editors at [facility]   What is a physical? -- A physical is a routine visit, or \"check-up,\" with your doctor. You might also hear it called a \"wellness visit\" or \"preventive visit.\"  During each visit, the doctor will:   Ask about your physical and mental health   Ask about your habits, behaviors, and lifestyle   Do an exam   Give you vaccines if needed   Talk to you about any medicines you take   Give advice about your health   Answer your questions  Getting regular check-ups is an important part of taking care of your health. It can help your doctor find and treat any problems you have. But it's also important for preventing health problems.  A routine physical is different from a \"sick visit.\" A sick visit is when you see a doctor because of a health concern or problem. Since physicals are scheduled ahead of time, you can think about what you want to ask the doctor.  How often should I get a physical? -- It depends on your age and health. In general, for people age 21 years and older:   If you are younger than 50 years, you might be able to get a physical every 3 years.   If you are 50 years or older, your doctor might recommend a physical every year.  If you have an ongoing health condition, like diabetes or high blood pressure, your doctor will probably want to see you more often.  What happens during a physical? -- In general, each visit will include:   Physical exam - The doctor or nurse will check your height, weight, heart rate, and blood pressure. They will also look at your eyes and ears. They will ask about how you are feeling and whether you have any symptoms that bother you.   Medicines - It's a good idea to bring a list of all the medicines you take to each doctor visit. Your doctor will talk to you about your medicines and answer any questions. Tell them if you are having any side effects that bother you. You " "should also tell them if you are having trouble paying for any of your medicines.   Habits and behaviors - This includes:   Your diet   Your exercise habits   Whether you smoke, drink alcohol, or use drugs   Whether you are sexually active   Whether you feel safe at home  Your doctor will talk to you about things you can do to improve your health and lower your risk of health problems. They will also offer help and support. For example, if you want to quit smoking, they can give you advice and might prescribe medicines. If you want to improve your diet or get more physical activity, they can help you with this, too.   Lab tests, if needed - The tests you get will depend on your age and situation. For example, your doctor might want to check your:   Cholesterol   Blood sugar   Iron level   Vaccines - The recommended vaccines will depend on your age, health, and what vaccines you already had. Vaccines are very important because they can prevent certain serious or deadly infections.   Discussion of screening - \"Screening\" means checking for diseases or other health problems before they cause symptoms. Your doctor can recommend screening based on your age, risk, and preferences. This might include tests to check for:   Cancer, such as breast, prostate, cervical, ovarian, colorectal, prostate, lung, or skin cancer   Sexually transmitted infections, such as chlamydia and gonorrhea   Mental health conditions like depression and anxiety  Your doctor will talk to you about the different types of screening tests. They can help you decide which screenings to have. They can also explain what the results might mean.   Answering questions - The physical is a good time to ask the doctor or nurse questions about your health. If needed, they can refer you to other doctors or specialists, too.  Adults older than 65 years often need other care, too. As you get older, your doctor will talk to you about:   How to prevent falling at " home   Hearing or vision tests   Memory testing   How to take your medicines safely   Making sure that you have the help and support you need at home  All topics are updated as new evidence becomes available and our peer review process is complete.  This topic retrieved from [employer] on: May 02, 2024.  Topic 021538 Version 1.0  Release: 32.4.3 - C32.122  © 2024 UpToDate, Inc. and/or its affiliates. All rights reserved.  Consumer Information Use and Disclaimer   Disclaimer: This generalized information is a limited summary of diagnosis, treatment, and/or medication information. It is not meant to be comprehensive and should be used as a tool to help the user understand and/or assess potential diagnostic and treatment options. It does NOT include all information about conditions, treatments, medications, side effects, or risks that may apply to a specific patient. It is not intended to be medical advice or a substitute for the medical advice, diagnosis, or treatment of a health care provider based on the health care provider's examination and assessment of a patient's specific and unique circumstances. Patients must speak with a health care provider for complete information about their health, medical questions, and treatment options, including any risks or benefits regarding use of medications. This information does not endorse any treatments or medications as safe, effective, or approved for treating a specific patient. UpToDate, Inc. and its affiliates disclaim any warranty or liability relating to this information or the use thereof.The use of this information is governed by the Terms of Use, available at https://www.[employer]uwer.com/en/know/clinical-effectiveness-terms. 2024© UpToDate, Inc. and its affiliates and/or licensors. All rights reserved.  Copyright   © 2024 UpToDate, Inc. and/or its affiliates. All rights reserved.

## 2024-09-10 DIAGNOSIS — J30.9 ALLERGIC RHINITIS, UNSPECIFIED SEASONALITY, UNSPECIFIED TRIGGER: ICD-10-CM

## 2024-09-10 RX ORDER — MONTELUKAST SODIUM 10 MG/1
10 TABLET ORAL EVERY EVENING
Qty: 90 TABLET | Refills: 3 | Status: SHIPPED | OUTPATIENT
Start: 2024-09-10

## 2024-09-10 NOTE — ASSESSMENT & PLAN NOTE
Lab Results   Component Value Date    HGBA1C 6.4 (H) 08/28/2024   Controlled on metformin and Ozempic  She is on a statin and ACE inh  Scheduled for eye exam at [facility] and report to be requested

## 2024-09-10 NOTE — ASSESSMENT & PLAN NOTE
Reflux lately and taking 's omeprazole  Continue use   Schedule with GI, discuss EGD, due for colonoscopy

## 2024-09-10 NOTE — ASSESSMENT & PLAN NOTE
She has been needing Flovent and albuterol recently, refills needed  Switch to Pulmicort based on covered alternatives

## 2024-09-10 NOTE — ASSESSMENT & PLAN NOTE
A few months of poor balance, tremors  + Romberg and abnormal tandem walk on exam  Recommend to obtain and MRI and have neurology consultation

## 2024-09-12 ENCOUNTER — TELEPHONE (OUTPATIENT)
Dept: INTERNAL MEDICINE CLINIC | Facility: CLINIC | Age: 59
End: 2024-09-12

## 2024-09-16 DIAGNOSIS — I10 BENIGN ESSENTIAL HYPERTENSION: ICD-10-CM

## 2024-09-16 NOTE — TELEPHONE ENCOUNTER
PA for Ozempic, 2 MG/DOSE, 8 MG/3ML injection pen APPROVED     Date(s) approved 9- - 9-      Patient advised by          [x][employer] Message  [x]Phone call   []LMOM  []L/M to call office as no active Communication consent on file  []Unable to leave detailed message as VM not approved on Communication consent       Pharmacy advised by    [x]Fax  []Phone call    Approval letter scanned into Media Yes

## 2024-09-16 NOTE — TELEPHONE ENCOUNTER
PA for Ozempic 8 mg/3 ml SUBMITTED     via    [x]CM-KEY: [id number]  []SensGardscPanorama9-Case ID #   []Faxed to plan   []Other website   []Phone call Case ID #     Office notes sent, clinical questions answered. Awaiting determination    Turnaround time for your insurance to make a decision on your Prior Authorization can take 7-21 business days.

## 2024-09-17 RX ORDER — LISINOPRIL 5 MG/1
5 TABLET ORAL DAILY
Qty: 90 TABLET | Refills: 1 | Status: SHIPPED | OUTPATIENT
Start: 2024-09-17

## 2024-09-22 DIAGNOSIS — F41.9 ANXIETY: ICD-10-CM

## 2024-09-23 RX ORDER — SERTRALINE HYDROCHLORIDE 25 MG/1
25 TABLET, FILM COATED ORAL DAILY
Qty: 30 TABLET | Refills: 5 | Status: SHIPPED | OUTPATIENT
Start: 2024-09-23

## 2024-09-26 DIAGNOSIS — Z00.6 ENCOUNTER FOR EXAMINATION FOR NORMAL COMPARISON OR CONTROL IN CLINICAL RESEARCH PROGRAM: ICD-10-CM

## 2024-10-03 ENCOUNTER — TELEPHONE (OUTPATIENT)
Age: 59
End: 2024-10-03

## 2024-10-03 NOTE — TELEPHONE ENCOUNTER
Where is she going to take the prescriptions?  Because if she is taking them to the pharmacy, the pharmacy said she does not need any scripts

## 2024-10-03 NOTE — TELEPHONE ENCOUNTER
Patient going to [facility] to get flu vaccine and needs a script to have RSV, COVID and Pneumonia done.  Can the doctor write a script and call patient back.      She spoke with her insurance company and they are covered at pharmacy.

## 2024-10-03 NOTE — TELEPHONE ENCOUNTER
Patient returning a call from Rachel regarding the vaccines she will be getting at th [facility]. She wants to make sure that her state insurance doesn't require a script. She states she called her insurance who states she does need a script and wants to  script on Saturday when she comes in for her flu shot

## 2024-10-03 NOTE — TELEPHONE ENCOUNTER
Patient is requesting paper prescriptions for RSV, Covid and appropriate Pneumonia so she may have this completed at her local pharmacy.

## 2024-10-04 DIAGNOSIS — Z23 NEED FOR VACCINATION: Primary | ICD-10-CM

## 2024-10-05 ENCOUNTER — IMMUNIZATIONS (OUTPATIENT)
Dept: INTERNAL MEDICINE CLINIC | Facility: CLINIC | Age: 59
End: 2024-10-05
Payer: MEDICARE

## 2024-10-05 DIAGNOSIS — Z23 ENCOUNTER FOR IMMUNIZATION: Primary | ICD-10-CM

## 2024-10-05 PROCEDURE — 90471 IMMUNIZATION ADMIN: CPT

## 2024-10-05 PROCEDURE — 90673 RIV3 VACCINE NO PRESERV IM: CPT

## 2024-10-25 ENCOUNTER — APPOINTMENT (OUTPATIENT)
Dept: LAB | Facility: HOSPITAL | Age: 59
End: 2024-10-25
Attending: PATHOLOGY

## 2024-10-25 DIAGNOSIS — Z00.6 ENCOUNTER FOR EXAMINATION FOR NORMAL COMPARISON OR CONTROL IN CLINICAL RESEARCH PROGRAM: ICD-10-CM

## 2024-10-25 PROCEDURE — 36415 COLL VENOUS BLD VENIPUNCTURE: CPT

## 2024-11-03 DIAGNOSIS — E11.65 UNCONTROLLED TYPE 2 DIABETES MELLITUS WITH HYPERGLYCEMIA (HCC): ICD-10-CM

## 2024-11-03 RX ORDER — METFORMIN HYDROCHLORIDE 500 MG/1
1000 TABLET, EXTENDED RELEASE ORAL 2 TIMES DAILY WITH MEALS
Qty: 360 TABLET | Refills: 1 | Status: SHIPPED | OUTPATIENT
Start: 2024-11-03

## 2024-11-04 LAB
APOB+LDLR+PCSK9 GENE MUT ANL BLD/T: NOT DETECTED
BRCA1+BRCA2 DEL+DUP + FULL MUT ANL BLD/T: NOT DETECTED
MLH1+MSH2+MSH6+PMS2 GN DEL+DUP+FUL M: NOT DETECTED

## 2024-12-02 LAB
LEFT EYE DIABETIC RETINOPATHY: NORMAL
RIGHT EYE DIABETIC RETINOPATHY: NORMAL

## 2024-12-05 ENCOUNTER — HOSPITAL ENCOUNTER (OUTPATIENT)
Dept: ULTRASOUND IMAGING | Facility: HOSPITAL | Age: 59
End: 2024-12-05
Payer: MEDICARE

## 2024-12-05 ENCOUNTER — APPOINTMENT (OUTPATIENT)
Dept: LAB | Facility: HOSPITAL | Age: 59
End: 2024-12-05
Payer: MEDICARE

## 2024-12-05 DIAGNOSIS — N20.0 KIDNEY STONE: ICD-10-CM

## 2024-12-05 DIAGNOSIS — E83.52 HYPERCALCEMIA: ICD-10-CM

## 2024-12-05 LAB
25(OH)D3 SERPL-MCNC: 32.8 NG/ML (ref 30–100)
ANION GAP SERPL CALCULATED.3IONS-SCNC: 10 MMOL/L (ref 4–13)
BUN SERPL-MCNC: 22 MG/DL (ref 5–25)
CALCIUM SERPL-MCNC: 9.3 MG/DL (ref 8.4–10.2)
CHLORIDE SERPL-SCNC: 97 MMOL/L (ref 96–108)
CO2 SERPL-SCNC: 28 MMOL/L (ref 21–32)
CREAT SERPL-MCNC: 0.85 MG/DL (ref 0.6–1.3)
FERRITIN SERPL-MCNC: 8 NG/ML (ref 11–307)
GFR SERPL CREATININE-BSD FRML MDRD: 74 ML/MIN/1.73SQ M
GLUCOSE P FAST SERPL-MCNC: 104 MG/DL (ref 65–99)
POTASSIUM SERPL-SCNC: 4.4 MMOL/L (ref 3.5–5.3)
SODIUM SERPL-SCNC: 135 MMOL/L (ref 135–147)
VIT B12 SERPL-MCNC: 194 PG/ML (ref 180–914)

## 2024-12-05 PROCEDURE — 80048 BASIC METABOLIC PNL TOTAL CA: CPT

## 2024-12-05 PROCEDURE — 76775 US EXAM ABDO BACK WALL LIM: CPT

## 2024-12-06 LAB
IRON SATN MFR SERPL: 14 % (ref 15–50)
IRON SERPL-MCNC: 61 UG/DL (ref 50–212)
TIBC SERPL-MCNC: 426 UG/DL (ref 250–450)
UIBC SERPL-MCNC: 365 UG/DL (ref 155–355)

## 2024-12-12 ENCOUNTER — RESULTS FOLLOW-UP (OUTPATIENT)
Dept: INTERNAL MEDICINE CLINIC | Facility: CLINIC | Age: 59
End: 2024-12-12

## 2024-12-12 DIAGNOSIS — E11.9 TYPE 2 DIABETES MELLITUS WITHOUT COMPLICATION, WITHOUT LONG-TERM CURRENT USE OF INSULIN (HCC): Primary | ICD-10-CM

## 2024-12-12 DIAGNOSIS — D50.8 IRON DEFICIENCY ANEMIA SECONDARY TO INADEQUATE DIETARY IRON INTAKE: ICD-10-CM

## 2024-12-12 DIAGNOSIS — E53.8 B12 DEFICIENCY: ICD-10-CM

## 2024-12-12 NOTE — TELEPHONE ENCOUNTER
Patient is returning a call from Germaine.  N/A at practice.  Please call patient when able.  Thank you.

## 2024-12-16 ENCOUNTER — OFFICE VISIT (OUTPATIENT)
Dept: UROLOGY | Facility: CLINIC | Age: 59
End: 2024-12-16
Payer: MEDICARE

## 2024-12-16 VITALS
BODY MASS INDEX: 27.31 KG/M2 | WEIGHT: 160 LBS | SYSTOLIC BLOOD PRESSURE: 130 MMHG | HEART RATE: 91 BPM | OXYGEN SATURATION: 99 % | HEIGHT: 64 IN | DIASTOLIC BLOOD PRESSURE: 88 MMHG

## 2024-12-16 DIAGNOSIS — R35.0 URINARY FREQUENCY: Primary | ICD-10-CM

## 2024-12-16 DIAGNOSIS — N20.0 KIDNEY STONE: ICD-10-CM

## 2024-12-16 DIAGNOSIS — R33.9 INCOMPLETE BLADDER EMPTYING: ICD-10-CM

## 2024-12-16 LAB
POST-VOID RESIDUAL VOLUME, ML POC: 17 ML
SL AMB  POCT GLUCOSE, UA: NORMAL
SL AMB LEUKOCYTE ESTERASE,UA: NORMAL
SL AMB POCT BILIRUBIN,UA: NORMAL
SL AMB POCT BLOOD,UA: NORMAL
SL AMB POCT CLARITY,UA: CLEAR
SL AMB POCT COLOR,UA: YELLOW
SL AMB POCT KETONES,UA: NORMAL
SL AMB POCT NITRITE,UA: NORMAL
SL AMB POCT PH,UA: 6
SL AMB POCT SPECIFIC GRAVITY,UA: 1.01
SL AMB POCT URINE PROTEIN: NORMAL
SL AMB POCT UROBILINOGEN: 0.2

## 2024-12-16 PROCEDURE — 87077 CULTURE AEROBIC IDENTIFY: CPT

## 2024-12-16 PROCEDURE — 87086 URINE CULTURE/COLONY COUNT: CPT

## 2024-12-16 PROCEDURE — 99213 OFFICE O/P EST LOW 20 MIN: CPT

## 2024-12-16 PROCEDURE — 81002 URINALYSIS NONAUTO W/O SCOPE: CPT

## 2024-12-16 PROCEDURE — 51798 US URINE CAPACITY MEASURE: CPT

## 2024-12-16 PROCEDURE — 87186 SC STD MICRODIL/AGAR DIL: CPT

## 2024-12-16 NOTE — PROGRESS NOTES
12/16/2024      Chief Complaint   Patient presents with    Follow-up     Hx of kidney stones; review ultrasound and labs    Urinary Frequency     New issue (approx a couple of months); frequency, feeling of incomplete emptying, foul odor to urine         Assessment and Plan    58 y.o. female     1. Kidney stones  - s/p left PCNL 2022  - on potassium citrate BID  -Renal US (12/05/24) showing a few small renal cysts in left kidney. Mild fullness of left renal pelvis, no hydronephrosis. No stones indentified in the kidneys.  -Creatinine 0.85, GFR 74 (12/5/24)  -Patient states she is having urinary frequency, malodorous urine, and feeling of bladder discomfort. Denies dysuria, flank pain, fevers, and gross hematuria.  -urine dip positive for trace leukocytes. Negative for nitrites and blood. Will send for culture. If culture is negative for infection, we will repeat a CT. We will notify patient of results.  -Patient will also continue with yearly US PTV.      History of Present Illness  Gilberto Mesa is a 58 y.o. female here with a history of staghorn calculus presenting for follow-up.     Patient has history of a left staghorn calculus.  Elected for left PCNL. Left PCNU placed 3/1/22 with IR. PCNL 3/7/22 followed by repeat PCNL 4/20/22. Underwent stent and nephrostomy tube removal 5/5/22.         Review of Systems   Constitutional:  Negative for chills and fever.   HENT:  Negative for ear pain and sore throat.    Eyes:  Negative for pain and visual disturbance.   Respiratory:  Negative for cough and shortness of breath.    Cardiovascular:  Negative for chest pain and palpitations.   Gastrointestinal:  Negative for abdominal pain, nausea and vomiting.   Genitourinary:  Positive for frequency and pelvic pain. Negative for difficulty urinating, dysuria, flank pain, hematuria and urgency.   Musculoskeletal:  Negative for arthralgias and back pain.   Skin:  Negative for color change and rash.   Neurological:  Negative for  "seizures and syncope.   All other systems reviewed and are negative.               Vitals  Vitals:    12/16/24 1026   BP: 130/88   BP Location: Left arm   Patient Position: Sitting   Cuff Size: Adult   Pulse: 91   SpO2: 99%   Weight: 72.6 kg (160 lb)   Height: 5' 4\" (1.626 m)       Physical Exam  Constitutional:       General: She is not in acute distress.     Appearance: Normal appearance.   HENT:      Head: Normocephalic.   Eyes:      Extraocular Movements: Extraocular movements intact.      Pupils: Pupils are equal, round, and reactive to light.   Pulmonary:      Effort: Pulmonary effort is normal. No respiratory distress.   Musculoskeletal:         General: Normal range of motion.      Cervical back: Normal range of motion.   Neurological:      Mental Status: She is alert and oriented to person, place, and time.   Psychiatric:         Mood and Affect: Mood normal.         Behavior: Behavior normal.         Thought Content: Thought content normal.         Judgment: Judgment normal.           Past History  Past Medical History:   Diagnosis Date    Abnormal LFTs (liver function tests)     last assessed 09/11/14    Anemia     last assessed 07/28/14    Anemia 08/05/2021    Anesthesia complication     Restrictive airway diesease- has asthma attack when extubated    Asthma     Asthma 06/17/2013    Colon polyp     COVID-19 virus infection 09/17/2022    Diabetes mellitus (HCC)     Diabetes mellitus due to underlying condition with hyperglycemia, without long-term current use of insulin (HCC) 09/11/2020    Elevated blood pressure reading     last assessed 06/17/13    GERD (gastroesophageal reflux disease)     Hematuria     last assessed 09/11/14    Hypercalcemia     last assessed 09/11/14    Hyperlipidemia     Kidney stone     Leukocytosis     last assessed 09/11/124    Microalbuminuria     last assessed 09/11/14    Paresthesia 09/21/2017    Reactive airway disease     Restrictive lung disease     Skin rash 09/21/2017    " Snoring 06/17/2013    Type 2 diabetes mellitus with hyperglycemia ([facility]) 07/02/2013    Type 2 diabetes mellitus without complication, without long-term current use of insulin ([facility]) 09/11/2020    Uncontrolled type 2 diabetes mellitus with hyperglycemia ([facility]) 08/05/2021    UTI (urinary tract infection) 08/07/2017     Social History     Socioeconomic History    Marital status: /Civil Union     Spouse name: None    Number of children: None    Years of education: None    Highest education level: None   Occupational History    None   Tobacco Use    Smoking status: Never    Smokeless tobacco: Never   Vaping Use    Vaping status: Never Used   Substance and Sexual Activity    Alcohol use: Yes     Comment: social    Drug use: Never    Sexual activity: Yes     Partners: Male     Birth control/protection: Female Sterilization   Other Topics Concern    None   Social History Narrative    Exercising regularly     Social Drivers of Health     Financial Resource Strain: Not on file   Food Insecurity: Not on file   Transportation Needs: Not on file   Physical Activity: Sufficiently Active (10/20/2022)    Exercise Vital Sign     Days of Exercise per Week: 6 days     Minutes of Exercise per Session: 40 min   Stress: Not on file   Social Connections: Not on file   Intimate Partner Violence: Not on file   Housing Stability: Not on file     Social History     Tobacco Use   Smoking Status Never   Smokeless Tobacco Never     Family History   Problem Relation Age of Onset    Dementia Mother     Parkinsonism Mother     Spinal muscular atrophy Mother     Diabetes Father         borderline    Melanoma Father     Anxiety disorder Sister     No Known Problems Daughter     No Known Problems Maternal Grandmother     No Known Problems Maternal Grandfather     No Known Problems Paternal Grandmother     No Known Problems Paternal Grandfather     No Known Problems Maternal Aunt     No Known Problems Paternal Aunt     Breast cancer Neg Hx   "      The following portions of the patient's history were reviewed and updated as appropriate: allergies, current medications, past medical history, past social history, past surgical history and problem list.    Results  No results found for this or any previous visit (from the past hour).]  No results found for: \"PSA\"  Lab Results   Component Value Date    GLUCOSE 145 (H) 04/20/2022    CALCIUM 9.3 12/05/2024     08/04/2017    K 4.4 12/05/2024    CO2 28 12/05/2024    CL 97 12/05/2024    BUN 22 12/05/2024    CREATININE 0.85 12/05/2024     Lab Results   Component Value Date    WBC 10.41 (H) 08/28/2024    HGB 11.2 (L) 08/28/2024    HCT 36.1 08/28/2024    MCV 87 08/28/2024     08/28/2024       RENEE Harper  "

## 2024-12-17 ENCOUNTER — RESULTS FOLLOW-UP (OUTPATIENT)
Dept: UROLOGY | Facility: CLINIC | Age: 59
End: 2024-12-17

## 2024-12-17 DIAGNOSIS — N39.0 RECURRENT UTI: Primary | ICD-10-CM

## 2024-12-17 RX ORDER — CEPHALEXIN 500 MG/1
500 CAPSULE ORAL EVERY 8 HOURS SCHEDULED
Qty: 21 CAPSULE | Refills: 0 | Status: SHIPPED | OUTPATIENT
Start: 2024-12-17 | End: 2024-12-24

## 2024-12-17 NOTE — TELEPHONE ENCOUNTER
----- Message from ERNEE Harper sent at 12/17/2024  1:32 PM EST -----  Please notify patient that ABX sent to pharmacy based off of preliminary UC. We will notify of final UC results if ABX need to be changed.

## 2024-12-18 LAB — BACTERIA UR CULT: ABNORMAL

## 2024-12-30 ENCOUNTER — HOSPITAL ENCOUNTER (OUTPATIENT)
Dept: MAMMOGRAPHY | Facility: HOSPITAL | Age: 59
Discharge: HOME | End: 2024-12-30
Payer: MEDICARE

## 2024-12-30 VITALS — BODY MASS INDEX: 27.31 KG/M2 | WEIGHT: 160 LBS | HEIGHT: 64 IN

## 2024-12-30 DIAGNOSIS — Z12.31 SCREENING MAMMOGRAM, ENCOUNTER FOR: ICD-10-CM

## 2024-12-30 PROCEDURE — 77063 BREAST TOMOSYNTHESIS BI: CPT

## 2024-12-30 PROCEDURE — 77067 SCR MAMMO BI INCL CAD: CPT

## 2025-01-27 DIAGNOSIS — F90.0 ATTENTION DEFICIT HYPERACTIVITY DISORDER (ADHD), PREDOMINANTLY INATTENTIVE TYPE: ICD-10-CM

## 2025-01-27 DIAGNOSIS — F32.2 CURRENT SEVERE EPISODE OF MAJOR DEPRESSIVE DISORDER WITHOUT PSYCHOTIC FEATURES, UNSPECIFIED WHETHER RECURRENT (HCC): ICD-10-CM

## 2025-01-27 RX ORDER — BUPROPION HYDROCHLORIDE 300 MG/1
300 TABLET ORAL EVERY MORNING
Qty: 30 TABLET | Refills: 5 | Status: SHIPPED | OUTPATIENT
Start: 2025-01-27 | End: 2025-07-26

## 2025-01-27 RX ORDER — BUPROPION HYDROCHLORIDE 150 MG/1
150 TABLET ORAL EVERY MORNING
Qty: 30 TABLET | Refills: 5 | Status: SHIPPED | OUTPATIENT
Start: 2025-01-27

## 2025-02-05 ENCOUNTER — TELEPHONE (OUTPATIENT)
Age: 60
End: 2025-02-05

## 2025-02-05 DIAGNOSIS — D50.8 IRON DEFICIENCY ANEMIA SECONDARY TO INADEQUATE DIETARY IRON INTAKE: ICD-10-CM

## 2025-02-05 DIAGNOSIS — N20.0 STAGHORN RENAL CALCULUS: ICD-10-CM

## 2025-02-05 DIAGNOSIS — E53.8 B12 DEFICIENCY: Primary | ICD-10-CM

## 2025-02-05 RX ORDER — FERROUS SULFATE 324(65)MG
324 TABLET, DELAYED RELEASE (ENTERIC COATED) ORAL DAILY
Qty: 90 TABLET | Refills: 1 | Status: SHIPPED | OUTPATIENT
Start: 2025-02-05

## 2025-02-05 RX ORDER — LANOLIN ALCOHOL/MO/W.PET/CERES
1000 CREAM (GRAM) TOPICAL DAILY
Qty: 90 TABLET | Refills: 3 | Status: SHIPPED | OUTPATIENT
Start: 2025-02-05

## 2025-02-05 RX ORDER — POTASSIUM CITRATE 10 MEQ/1
10 TABLET, EXTENDED RELEASE ORAL 2 TIMES DAILY
Qty: 180 TABLET | Refills: 1 | Status: SHIPPED | OUTPATIENT
Start: 2025-02-05

## 2025-02-05 NOTE — TELEPHONE ENCOUNTER
The patient called to find out if Dr. Reyes will increase her medication, ferrous sulfate 324 (65 Fe) mg, and reported that during her last visit she had mentioned increasing the dose and that the patient wanted to know before looking for the refill she has at the pharmacy because if the dose was increased she would need a new prescription with a 90-day supply. The patient also mentioned that she had been told about B12 and wanted to know if she had to go to the office or if they would send a prescription to the pharmacy. If that is the case, she would like 90 days of the medication as well, which is cheaper for her.     Please Advice

## 2025-02-05 NOTE — TELEPHONE ENCOUNTER
Pt states she was just seen in December and has an upcoming appt in December 2025. She is requesting at least 2 refills be added to her request. Please determine if appropriate    Reason for call:   [x] Refill   [] Prior Auth  [] Other:     Office:   [] PCP/Provider -   [x] Specialty/Provider - [facility] / RENEE Bell     Medication:   ~ potassium citrate (UROCIT-K) 10 mEq - Take 1 tablet (10 mEq total) by mouth 2 (two) times a day     Pharmacy:   [facility]/pharmacy #[id number] [address]     Does the patient have enough for 3 days?   [] Yes   [x] No - Send as HP to POD

## 2025-02-06 NOTE — TELEPHONE ENCOUNTER
Continue taking the iron daily and I will send the prescription.  B12 is over-the-counter but I we will send a prescription if it is cheaper that way.  There are active orders for blood work for her to do before I see her this month.

## 2025-04-08 ENCOUNTER — PATIENT MESSAGE (OUTPATIENT)
Dept: UROLOGY | Facility: CLINIC | Age: 60
End: 2025-04-08

## 2025-04-08 ENCOUNTER — NURSE TRIAGE (OUTPATIENT)
Dept: OTHER | Facility: OTHER | Age: 60
End: 2025-04-08

## 2025-04-08 DIAGNOSIS — N39.0 RECURRENT UTI: Primary | ICD-10-CM

## 2025-04-08 RX ORDER — SULFAMETHOXAZOLE AND TRIMETHOPRIM 800; 160 MG/1; MG/1
1 TABLET ORAL EVERY 12 HOURS SCHEDULED
Qty: 14 TABLET | Refills: 0 | Status: SHIPPED | OUTPATIENT
Start: 2025-04-08 | End: 2025-04-15

## 2025-04-08 NOTE — TELEPHONE ENCOUNTER
Regarding: urinary symptoms  ----- Message from Su MILLER sent at 4/8/2025  5:58 PM EDT -----  Patient stated she got a call and was told to call back. Chart shows nurse called patient to speak with her regarding urinary symptoms. Patient would like to speak to a nurse.

## 2025-04-08 NOTE — TELEPHONE ENCOUNTER
"FOLLOW UP: If symptoms persist after antibiotic treatment, follow up with Urology office in 1-2 weeks    REASON FOR CONVERSATION: Urinary Symptoms    SYMPTOMS: Urine odor and burning after urination that radiates into lower abdomen    OTHER: Esc on call provider for urology, advised that patient should start antibiotic treatment and call the office during business hours for symptoms unrelieved by treatment in 1-2 weeks. Advised patient of treatment plan and recommendations, patient verbalized an understanding and will get antibiotic this evening.     DISPOSITION: See PCP Within 24 Hours      Reason for Disposition   Urinating more frequently than usual (i.e., frequency) OR new-onset of the feeling of an urgent need to urinate (i.e., urgency)    Answer Assessment - Initial Assessment Questions  1. SYMPTOM: \"What's the main symptom you're concerned about?\" (e.g., frequency, incontinence)        Patient has noticed that her urine has had a foul smell to it and started a few weeks, and burning began a few days ago and starts at the lower abdomen    2. ONSET: \"When did the urine odor and burning  start?\"        Odor began a few weeks ago and burning began a few days ago    3. PAIN: \"Is there any pain?\" If Yes, ask: \"How bad is it?\" (Scale: 1-10; mild, moderate, severe)        4/10    4. CAUSE: \"What do you think is causing the symptoms?\"        Possible UTI, symptoms of the past    5. OTHER SYMPTOMS: \"Do you have any other symptoms?\" (e.g., blood in urine, fever, flank pain, pain with urination)        None    Protocols used: Urinary Symptoms-Adult-AH    "

## 2025-04-11 DIAGNOSIS — E78.2 MIXED HYPERLIPIDEMIA: ICD-10-CM

## 2025-04-11 DIAGNOSIS — E11.9 TYPE 2 DIABETES MELLITUS WITHOUT COMPLICATION, WITHOUT LONG-TERM CURRENT USE OF INSULIN (HCC): ICD-10-CM

## 2025-04-11 RX ORDER — ROSUVASTATIN CALCIUM 10 MG/1
10 TABLET, COATED ORAL DAILY
Qty: 30 TABLET | Refills: 5 | Status: SHIPPED | OUTPATIENT
Start: 2025-04-11

## 2025-04-12 DIAGNOSIS — N20.0 STAGHORN RENAL CALCULUS: ICD-10-CM

## 2025-04-14 RX ORDER — POTASSIUM CITRATE 1080 MG/1
10 TABLET, EXTENDED RELEASE ORAL 2 TIMES DAILY
Qty: 100 TABLET | Refills: 1 | Status: SHIPPED | OUTPATIENT
Start: 2025-04-14

## 2025-05-03 DIAGNOSIS — I10 BENIGN ESSENTIAL HYPERTENSION: ICD-10-CM

## 2025-05-05 RX ORDER — LISINOPRIL 5 MG/1
5 TABLET ORAL DAILY
Qty: 90 TABLET | Refills: 1 | Status: SHIPPED | OUTPATIENT
Start: 2025-05-05

## 2025-05-29 ENCOUNTER — OFFICE VISIT (OUTPATIENT)
Dept: OBGYN CLINIC | Facility: CLINIC | Age: 60
End: 2025-05-29
Payer: MEDICARE

## 2025-05-29 VITALS
HEIGHT: 64 IN | SYSTOLIC BLOOD PRESSURE: 124 MMHG | BODY MASS INDEX: 27.66 KG/M2 | DIASTOLIC BLOOD PRESSURE: 80 MMHG | WEIGHT: 162 LBS

## 2025-05-29 DIAGNOSIS — E28.319 EARLY MENOPAUSE OCCURRING IN PATIENT AGE YOUNGER THAN 45 YEARS: ICD-10-CM

## 2025-05-29 DIAGNOSIS — Z13.820 SCREENING FOR OSTEOPOROSIS: ICD-10-CM

## 2025-05-29 DIAGNOSIS — N95.1 POSTMENOPAUSAL DISORDER: Primary | ICD-10-CM

## 2025-05-29 PROCEDURE — 99203 OFFICE O/P NEW LOW 30 MIN: CPT | Performed by: PHYSICIAN ASSISTANT

## 2025-05-29 RX ORDER — PROGESTERONE 100 MG/1
100 CAPSULE ORAL DAILY
Qty: 30 CAPSULE | Refills: 3 | Status: SHIPPED | OUTPATIENT
Start: 2025-05-29

## 2025-05-29 RX ORDER — ESTRADIOL 0.5 MG/1
0.5 TABLET ORAL DAILY
Qty: 30 TABLET | Refills: 3 | Status: SHIPPED | OUTPATIENT
Start: 2025-05-29

## 2025-05-29 NOTE — PROGRESS NOTES
Name: Gilberto Mesa      : 1965      MRN: [id number]  Encounter Provider: Alberta Presley PA-C  Encounter Date: 2025   Encounter department: [facility] [place] [facility]  :  Assessment & Plan  Postmenopausal disorder    Orders:    estradiol (ESTRACE) 0.5 MG tablet; Take 1 tablet (0.5 mg total) by mouth daily    Progesterone 100 MG CAPS; Take 100 mg by mouth in the morning    DXA bone density spine hip and pelvis; Future    Pleasant 59-year-old female presenting today for many post menopause symptoms as described in HPI.  Patient unfortunately suffered from endometriosis with reported history of total hysterectomy, bilateral salpingo-oophorectomy at age 34.  She noted there after she was placed on HRT for short times intermittently through the years but states was never consistent as she moved a lot and had different medical providers with different opinions regarding safety of HRT therapy.    She states she was fine for a while but through the past few months to years she had noticed significant symptoms including hot flashes/night sweats, some mood disturbance, sleep disturbance, vaginal dryness/dyspareunia, ongoing issues with migraines.    With patient's early menopause, though unable to fully reverse and reduce risk that is already been a consequence of medically induced menopause, I strongly feel she would benefit from HRT.  Risks and benefits of HRT versus nonhormonal medication management were all reviewed with her in detail today.    Discussed nonhormonal options to include Veozah however would be limited primarily just to use for vasomotor symptom.  Discussed consideration of SSRI/SNRI, patient may benefit from Effexor for mood/sleep, vasomotor symptom but also can be used off label in some patients for headache maintenance.  However this would not help with risk reduction for osteoporosis as well as cardiovascular.    Discussed HRT risk and benefit.  Would recommend adding  progesterone for breast protection.  Discussed concern if she has migraine with aura with slight increased risk of stroke however discussed study show this is more worrisome in birth control compared to HRT.  There is question if migraine more related to menopause versus general.  Patient expresses understanding of this discussion.  With shared decision making HRT with estrogen plus progesterone orally will be tried.    Patient to start estrogen 0.5 mg plus progesterone 100 mg daily.  Will have patient follow-up in the office in 2 to 3-month.  DEXA scan was ordered and encouraged weightbearing activity with continued vitamin D and calcium supplementation.  Patient also encouraged to keep up with breast health with regular breast exam and routine screening mammogram annually.    Patient expresses understanding of all that was discussed and agreeable to plan.    Early menopause occurring in patient age younger than 45 years    Orders:    estradiol (ESTRACE) 0.5 MG tablet; Take 1 tablet (0.5 mg total) by mouth daily    Progesterone 100 MG CAPS; Take 100 mg by mouth in the morning    DXA bone density spine hip and pelvis; Future    Screening for osteoporosis    Orders:    DXA bone density spine hip and pelvis; Future        History of Present Illness   60y/o female here today as a new patient here today for menopause talk -reports she was referred by her PCP.     She reports having total hysterectomy with B/L salpingectomy and oophorectomy age 34 due to extensive endometriosis.  She was then sent into medical menopause - states was on HRT for a few years inconsistently.  States she was of and on HRT for years as she moved a lot and variable recommendations from different providers.    She reports seem to be stable for a while but over the past several months to years has noticed an increase in menopause symptoms.  She reports still dealing with night sweats almost nightly.  Also hot/cold sweats daily.  Also dealing with  daily migraines, but has suffered from migraines all her life.  She reports after her pelvic surgery they suggested migraines were secondary to postmenopausal status however she reports her gynecologist told her likely not.  She has referral to neurology for further evaluation as well as for tremors, reports mother with Parkinson's and history of TIA.  She reports primarily headache pain and associated nausea.  Rare episodes of flashers.  She also has sleep issues, both difficulty falling asleep/staying asleep.  Vaginal dryness/dyspareunia.       Denies any hx of abnormal PAP smear/precancer.   She denies any known family history of breast cancer.  Her last mammography was December 2024 with scattered fibroglandular density, lifetime Tyrer-Cuzick rescore 7% and negative mammogram screening.    She is under treatment for hypertension which is currently controlled, type 2 diabetes and also takes low-dose statin for hyperlipidemia.  She also has history of kidney stones as well as treatment for ADHD and anxiety.    She reports taking calcium supplementation.  She does not think she ever had DEXA scan.  She denies any known personal history of cardiovascular disease, stroke or heart attack history.        History obtained from: patient    Review of Systems   Constitutional: Negative.    Eyes: Negative.    Respiratory: Negative.     Cardiovascular: Negative.    Gastrointestinal:  Negative for constipation, diarrhea, nausea and vomiting.   Endocrine:        Hot flashes, night sweats   Genitourinary:  Positive for dyspareunia (And dryness). Negative for dysuria, pelvic pain, vaginal bleeding, vaginal discharge and vaginal pain.   Neurological:  Positive for tremors and headaches. Negative for dizziness, seizures, speech difficulty, weakness, light-headedness and numbness.   Psychiatric/Behavioral:  Positive for sleep disturbance. The patient is nervous/anxious.         Occasional mild mood changes.          Objective   BP  "124/80 (BP Location: Left arm, Patient Position: Sitting, Cuff Size: Standard)   Ht 5' 4\" (1.626 m)   Wt 73.5 kg (162 lb)   BMI 27.81 kg/m²      Physical Exam  Vitals reviewed.   Constitutional:       Appearance: Normal appearance. She is not ill-appearing.     Cardiovascular:      Rate and Rhythm: Normal rate and regular rhythm.      Heart sounds: Normal heart sounds.   Pulmonary:      Effort: Pulmonary effort is normal.      Breath sounds: Normal breath sounds.   Abdominal:      General: There is no distension.      Palpations: Abdomen is soft.      Tenderness: There is no abdominal tenderness. There is no guarding.   Genitourinary:     Comments: Exam deferred today, not medically necessary for today's visit.    Musculoskeletal:      Cervical back: Neck supple.     Neurological:      Mental Status: She is alert and oriented to person, place, and time.     Psychiatric:         Mood and Affect: Mood normal.         Speech: Speech normal.         Behavior: Behavior normal. Behavior is cooperative.           "

## 2025-05-30 DIAGNOSIS — D50.8 IRON DEFICIENCY ANEMIA SECONDARY TO INADEQUATE DIETARY IRON INTAKE: ICD-10-CM

## 2025-06-01 RX ORDER — FERROUS SULFATE 324(65)MG
324 TABLET, DELAYED RELEASE (ENTERIC COATED) ORAL DAILY
Qty: 90 TABLET | Refills: 1 | Status: SHIPPED | OUTPATIENT
Start: 2025-06-01

## 2025-06-04 ENCOUNTER — TELEPHONE (OUTPATIENT)
Dept: INTERNAL MEDICINE CLINIC | Facility: CLINIC | Age: 60
End: 2025-06-04

## 2025-06-05 ENCOUNTER — TELEPHONE (OUTPATIENT)
Dept: ADMINISTRATIVE | Facility: OTHER | Age: 60
End: 2025-06-05

## 2025-06-05 NOTE — TELEPHONE ENCOUNTER
Upon review of the In Basket request and the patient's chart, initial outreach has been made via fax to facility. Please see Contacts section for details.     Thank you  Alla Ruiz MA

## 2025-06-05 NOTE — TELEPHONE ENCOUNTER
----- Message from Leilani MAURO sent at 6/4/2025  3:13 PM EDT -----  Regarding: Care Gap Request  06/04/25 3:13 PMHello, our patient above has had Diabetic Eye Exam completed/performed. Please assist in updating the patient chart by making an External outreach to [phone] facility located in [place], [place]. The date of service is 12/2/2024.Thank you,Leilani Johnson, [facility]

## 2025-06-05 NOTE — LETTER
Diabetic Eye Exam Form    Date Requested: 25  Patient: Gilberto Mesa  Patient : 1965   Referring Provider: Lea Reyes, MD      DIABETIC Eye Exam Date _______________________________      Type of Exam MUST be documented for Diabetic Eye Exams. Please CHECK ONE.     Retinal Exam       Dilated Retinal Exam       OCT       Optomap-Iris Exam      Fundus Photography       Left Eye - Please check Retinopathy or No Retinopathy        Exam did show retinopathy    Exam did not show retinopathy       Right Eye - Please check Retinopathy or No Retinopathy       Exam did show retinopathy    Exam did not show retinopathy       Comments ____Within 2024  ______________________________________________________    Practice Providing Exam ______________________________________________    Exam Performed By (print name) _______________________________________      Provider Signature ___________________________________________________      These reports are needed for  compliance.    Please fax this completed form and a copy of the Diabetic Eye Exam report to the [facility] Based Department as soon as possible via Fax [phone], attention Alla: Phone [phone]. Our office is located at [address] [place] [address].     We thank you for your assistance in treating our mutual patient.

## 2025-06-06 NOTE — TELEPHONE ENCOUNTER
Upon review of the In Basket request we were able to locate, review, and update the patient chart as requested for Diabetic Eye Exam.    Any additional questions or concerns should be emailed to the Practice Liaisons via the appropriate education email address, please do not reply via In Basket.    Thank you  Alla Ruiz MA   [facility]

## 2025-06-11 DIAGNOSIS — E11.9 TYPE 2 DIABETES MELLITUS WITHOUT COMPLICATION, WITHOUT LONG-TERM CURRENT USE OF INSULIN (HCC): ICD-10-CM

## 2025-06-11 DIAGNOSIS — E78.2 MIXED HYPERLIPIDEMIA: ICD-10-CM

## 2025-06-12 RX ORDER — ROSUVASTATIN CALCIUM 10 MG/1
10 TABLET, COATED ORAL DAILY
Qty: 90 TABLET | Refills: 0 | Status: SHIPPED | OUTPATIENT
Start: 2025-06-12

## 2025-07-01 ENCOUNTER — TELEPHONE (OUTPATIENT)
Dept: INTERNAL MEDICINE CLINIC | Facility: CLINIC | Age: 60
End: 2025-07-01

## 2025-07-01 NOTE — TELEPHONE ENCOUNTER
Please call the patient that I received a letter that the [employer] brand is no longer covered by her plan.  Is she due for a refill of her diabetic testing supplies?

## 2025-07-05 DIAGNOSIS — K21.9 GASTROESOPHAGEAL REFLUX DISEASE, UNSPECIFIED WHETHER ESOPHAGITIS PRESENT: ICD-10-CM

## 2025-07-06 RX ORDER — OMEPRAZOLE 40 MG/1
40 CAPSULE, DELAYED RELEASE ORAL DAILY
Qty: 90 CAPSULE | Refills: 2 | Status: SHIPPED | OUTPATIENT
Start: 2025-07-06

## (undated) DEVICE — 3M™ TEGADERM™ TRANSPARENT FILM DRESSING FRAME STYLE, 1627, 4 IN X 10 IN (10 CM X 25 CM), 20/CT 4CT/CASE: Brand: 3M™ TEGADERM™

## (undated) DEVICE — GLOVE SRG BIOGEL ECLIPSE 7.5

## (undated) DEVICE — SURGICAL GOWN, XL SMARTSLEEVE: Brand: CONVERTORS

## (undated) DEVICE — PROBE KIT 3.9 X 350MM  SWISS LITHOCLAST TRILOGY

## (undated) DEVICE — GUIDEWIRE AMPLATZ .035 260CM 6CM ST SS

## (undated) DEVICE — SYRINGE CATH TIP 50ML

## (undated) DEVICE — 3M™ TEGADERM™ TRANSPARENT FILM DRESSING FRAME STYLE, 1628, 6 IN X 8 IN (15 CM X 20 CM), 10/CT 8CT/CASE: Brand: 3M™ TEGADERM™

## (undated) DEVICE — ARTHROSCOPY FLOOR MAT

## (undated) DEVICE — SYRINGE 50ML LL

## (undated) DEVICE — TUBING SUCTION 5MM X 12 FT

## (undated) DEVICE — CATH FOLEY 22FR 5ML 2 WAY SILICONE ELASTIMER

## (undated) DEVICE — SURGIFOAM 8.5 X 12.5

## (undated) DEVICE — CYSTO TUBING TUR Y IRRIGATION

## (undated) DEVICE — URIMETER 2500ML

## (undated) DEVICE — PRESTO™ INFLATION DEVICE: Brand: PRESTO

## (undated) DEVICE — INTENDED FOR TISSUE SEPARATION, AND OTHER PROCEDURES THAT REQUIRE A SHARP SURGICAL BLADE TO PUNCTURE OR CUT.: Brand: BARD-PARKER SAFETY BLADES SIZE 15, STERILE

## (undated) DEVICE — GLOVE INDICATOR PI UNDERGLOVE SZ 8 BLUE

## (undated) DEVICE — INTENDED FOR TISSUE SEPARATION, AND OTHER PROCEDURES THAT REQUIRE A SHARP SURGICAL BLADE TO PUNCTURE OR CUT.: Brand: BARD-PARKER SAFETY BLADES SIZE 11, STERILE

## (undated) DEVICE — GAUZE SPONGES,16 PLY: Brand: CURITY

## (undated) DEVICE — BETHLEHEM UNIVERSAL MINOR GEN: Brand: CARDINAL HEALTH

## (undated) DEVICE — ADHESIVE SKIN HIGH VISCOSITY EXOFIN 1ML

## (undated) DEVICE — SUT MONOCRYL 3-0 PS-2 18 IN Y497G

## (undated) DEVICE — Device

## (undated) DEVICE — TRAY FOLEY 18FR URIMETER SURESTEP

## (undated) DEVICE — DRAIN SPONGES,6 PLY: Brand: EXCILON

## (undated) DEVICE — MULTIPURPOSE DRAINAGE CATHETER ULTRATHANE: Brand: COOK

## (undated) DEVICE — HEAVY DUTY TABLE COVER: Brand: CONVERTORS

## (undated) DEVICE — DRAPE CRANI

## (undated) DEVICE — SUT VICRYL 2-0 SH 27 IN UNDYED J417H

## (undated) DEVICE — ENDOSCOPIC VALVE WITH ADAPTER.: Brand: SURSEAL® II

## (undated) DEVICE — CHLORAPREP HI-LITE 26ML ORANGE

## (undated) DEVICE — INFLATION DEVICE BASIX 30ATM

## (undated) DEVICE — CATH BAL DIL NEP 10MM 15CM X-FRC N30 WO INFL DEV

## (undated) DEVICE — CATH URET .038 10FR 50CM DUAL LUMEN

## (undated) DEVICE — GUIDEWIRE STRGHT TIP 0.035 IN  SOLO PLUS

## (undated) DEVICE — BAG DRAINAGE 600ML W/LL ADAPTOR

## (undated) DEVICE — GUIDEWIRE AMPLATZ SS .038 180CM